# Patient Record
Sex: FEMALE | Race: WHITE | NOT HISPANIC OR LATINO
[De-identification: names, ages, dates, MRNs, and addresses within clinical notes are randomized per-mention and may not be internally consistent; named-entity substitution may affect disease eponyms.]

---

## 2022-05-18 ENCOUNTER — APPOINTMENT (OUTPATIENT)
Dept: CARDIOLOGY | Facility: CLINIC | Age: 87
End: 2022-05-18
Payer: MEDICARE

## 2022-05-18 VITALS
TEMPERATURE: 97 F | HEIGHT: 62 IN | DIASTOLIC BLOOD PRESSURE: 90 MMHG | SYSTOLIC BLOOD PRESSURE: 138 MMHG | WEIGHT: 152 LBS | BODY MASS INDEX: 27.97 KG/M2 | HEART RATE: 85 BPM

## 2022-05-18 PROCEDURE — 99205 OFFICE O/P NEW HI 60 MIN: CPT

## 2022-05-18 PROCEDURE — 93000 ELECTROCARDIOGRAM COMPLETE: CPT

## 2022-05-18 RX ORDER — DILTIAZEM HYDROCHLORIDE 120 MG/1
120 TABLET ORAL DAILY
Refills: 0 | Status: DISCONTINUED | COMMUNITY
End: 2022-05-18

## 2022-06-09 ENCOUNTER — APPOINTMENT (OUTPATIENT)
Dept: CARDIOLOGY | Facility: CLINIC | Age: 87
End: 2022-06-09

## 2022-06-11 ENCOUNTER — APPOINTMENT (OUTPATIENT)
Dept: CARDIOLOGY | Facility: CLINIC | Age: 87
End: 2022-06-11
Payer: MEDICARE

## 2022-06-11 PROCEDURE — 93306 TTE W/DOPPLER COMPLETE: CPT

## 2022-06-13 ENCOUNTER — INPATIENT (INPATIENT)
Facility: HOSPITAL | Age: 87
LOS: 7 days | Discharge: NURSING, REHAB OR LONG-TERM CARE | End: 2022-06-21
Attending: SURGERY | Admitting: SURGERY
Payer: MEDICARE

## 2022-06-13 VITALS
DIASTOLIC BLOOD PRESSURE: 79 MMHG | RESPIRATION RATE: 20 BRPM | HEART RATE: 84 BPM | TEMPERATURE: 99 F | SYSTOLIC BLOOD PRESSURE: 144 MMHG | OXYGEN SATURATION: 98 %

## 2022-06-13 LAB
APTT BLD: 23.1 SEC — CRITICAL LOW (ref 27–39.2)
BASE EXCESS BLDV CALC-SCNC: 1.5 MMOL/L — SIGNIFICANT CHANGE UP (ref -2–3)
BASOPHILS # BLD AUTO: 0.04 K/UL — SIGNIFICANT CHANGE UP (ref 0–0.2)
BASOPHILS NFR BLD AUTO: 0.5 % — SIGNIFICANT CHANGE UP (ref 0–1)
CA-I SERPL-SCNC: 1.25 MMOL/L — SIGNIFICANT CHANGE UP (ref 1.15–1.33)
EOSINOPHIL # BLD AUTO: 0.06 K/UL — SIGNIFICANT CHANGE UP (ref 0–0.7)
EOSINOPHIL NFR BLD AUTO: 0.7 % — SIGNIFICANT CHANGE UP (ref 0–8)
ETHANOL SERPL-MCNC: <10 MG/DL — SIGNIFICANT CHANGE UP
GAS PNL BLDV: 136 MMOL/L — SIGNIFICANT CHANGE UP (ref 136–145)
GAS PNL BLDV: SIGNIFICANT CHANGE UP
GAS PNL BLDV: SIGNIFICANT CHANGE UP
HCO3 BLDV-SCNC: 28 MMOL/L — SIGNIFICANT CHANGE UP (ref 22–29)
HCT VFR BLD CALC: 39.4 % — SIGNIFICANT CHANGE UP (ref 37–47)
HCT VFR BLDA CALC: 40 % — SIGNIFICANT CHANGE UP (ref 39–51)
HGB BLD CALC-MCNC: 13.4 G/DL — SIGNIFICANT CHANGE UP (ref 12.6–17.4)
HGB BLD-MCNC: 13.2 G/DL — SIGNIFICANT CHANGE UP (ref 12–16)
IMM GRANULOCYTES NFR BLD AUTO: 2 % — HIGH (ref 0.1–0.3)
INR BLD: 1.04 RATIO — SIGNIFICANT CHANGE UP (ref 0.65–1.3)
LACTATE BLDV-MCNC: 1.5 MMOL/L — SIGNIFICANT CHANGE UP (ref 0.5–2)
LACTATE SERPL-SCNC: 1.5 MMOL/L — SIGNIFICANT CHANGE UP (ref 0.7–2)
LYMPHOCYTES # BLD AUTO: 1.34 K/UL — SIGNIFICANT CHANGE UP (ref 1.2–3.4)
LYMPHOCYTES # BLD AUTO: 15.4 % — LOW (ref 20.5–51.1)
MCHC RBC-ENTMCNC: 30.9 PG — SIGNIFICANT CHANGE UP (ref 27–31)
MCHC RBC-ENTMCNC: 33.5 G/DL — SIGNIFICANT CHANGE UP (ref 32–37)
MCV RBC AUTO: 92.3 FL — SIGNIFICANT CHANGE UP (ref 81–99)
MONOCYTES # BLD AUTO: 0.53 K/UL — SIGNIFICANT CHANGE UP (ref 0.1–0.6)
MONOCYTES NFR BLD AUTO: 6.1 % — SIGNIFICANT CHANGE UP (ref 1.7–9.3)
NEUTROPHILS # BLD AUTO: 6.54 K/UL — HIGH (ref 1.4–6.5)
NEUTROPHILS NFR BLD AUTO: 75.3 % — HIGH (ref 42.2–75.2)
NRBC # BLD: 0 /100 WBCS — SIGNIFICANT CHANGE UP (ref 0–0)
PCO2 BLDV: 49 MMHG — HIGH (ref 39–42)
PH BLDV: 7.36 — SIGNIFICANT CHANGE UP (ref 7.32–7.43)
PLATELET # BLD AUTO: 156 K/UL — SIGNIFICANT CHANGE UP (ref 130–400)
PO2 BLDV: 25 MMHG — SIGNIFICANT CHANGE UP
POTASSIUM BLDV-SCNC: 4.4 MMOL/L — SIGNIFICANT CHANGE UP (ref 3.5–5.1)
PROTHROM AB SERPL-ACNC: 12 SEC — SIGNIFICANT CHANGE UP (ref 9.95–12.87)
RBC # BLD: 4.27 M/UL — SIGNIFICANT CHANGE UP (ref 4.2–5.4)
RBC # FLD: 13 % — SIGNIFICANT CHANGE UP (ref 11.5–14.5)
SAO2 % BLDV: 41.5 % — SIGNIFICANT CHANGE UP
WBC # BLD: 8.68 K/UL — SIGNIFICANT CHANGE UP (ref 4.8–10.8)
WBC # FLD AUTO: 8.68 K/UL — SIGNIFICANT CHANGE UP (ref 4.8–10.8)

## 2022-06-13 PROCEDURE — 99285 EMERGENCY DEPT VISIT HI MDM: CPT

## 2022-06-13 PROCEDURE — 72125 CT NECK SPINE W/O DYE: CPT | Mod: 26,MA

## 2022-06-13 PROCEDURE — 93010 ELECTROCARDIOGRAM REPORT: CPT

## 2022-06-13 PROCEDURE — 70450 CT HEAD/BRAIN W/O DYE: CPT | Mod: 26,MA

## 2022-06-13 RX ORDER — MORPHINE SULFATE 50 MG/1
4 CAPSULE, EXTENDED RELEASE ORAL ONCE
Refills: 0 | Status: DISCONTINUED | OUTPATIENT
Start: 2022-06-13 | End: 2022-06-13

## 2022-06-13 RX ORDER — SODIUM CHLORIDE 9 MG/ML
250 INJECTION INTRAMUSCULAR; INTRAVENOUS; SUBCUTANEOUS ONCE
Refills: 0 | Status: DISCONTINUED | OUTPATIENT
Start: 2022-06-13 | End: 2022-06-13

## 2022-06-13 RX ADMIN — MORPHINE SULFATE 4 MILLIGRAM(S): 50 CAPSULE, EXTENDED RELEASE ORAL at 23:33

## 2022-06-13 NOTE — ED PROVIDER NOTE - PROGRESS NOTE DETAILS
CHERRY: Case endorsed to Dr. Marti pending pan scan, re-eval, dispo as per trauma recs. CO- call from rad attending for CTAP finding c/f active bleed in pelvis, IR consult placed and case d/w res on call for IR- Dr Hutton, will rpt cbc will consult ortho, admit to SICU per trauma team -CD

## 2022-06-13 NOTE — CONSULT NOTE ADULT - SUBJECTIVE AND OBJECTIVE BOX
TRAUMA ACTIVATION LEVEL:  CODE / ALERT  / CONSULT  ACTIVATED BY: EMS**  /  ED**  INTUBATED: YES** / NO**      MECHANISM OF INJURY:   [] Blunt     [] MVC	  [X] Fall	  [] Pedestrian Struck	  [] Motorcycle     [] Assault     [] Bicycle collision    [] Sports injury    [] Penetrating    [] Gun Shot Wound      [] Stab Wound    GCS: 15 	E: 4	V: 5	M: 6    HPI:  89F w/ PMHx of HTN, HLD, cardiac arrhythmia, and CAD seen as Trauma Alert s/p mechanical fall w/ ?HT, ?LOC, -AC (although pt does not remember the fall). Fall was witnessed by daughter this evening, when she fell down some stairs onto her right side. Trauma assessment in ED: ABCs intact, GCS 15.     PAST MEDICAL & SURGICAL HISTORY:  HTN  HLD  arrhythmia  CAD  dilated aorta  cholecystectomy    Allergies  No Known Allergies    ROS: 10-system review is otherwise negative except HPI above.      Primary Survey:    A - airway intact  B - bilateral breath sounds and good chest rise  C - palpable pulses in all extremities  D - GCS 15 on arrival, LIPSCOMB  Exposure obtained    Vital Signs Last 24 Hrs  T(C): 37.1 (13 Jun 2022 22:57), Max: 37.1 (13 Jun 2022 22:57)  T(F): 98.8 (13 Jun 2022 22:57), Max: 98.8 (13 Jun 2022 22:57)  HR: 84 (13 Jun 2022 22:57) (84 - 84)  BP: 144/79 (13 Jun 2022 22:57) (144/79 - 144/79)  RR: 20 (13 Jun 2022 22:57) (20 - 20)  SpO2: 98% (13 Jun 2022 22:57) (98% - 98%)    Secondary Survey:   General: NAD  HEENT: Normocephalic, atraumatic, EOMI, PEERLA, no scalp lacerations   Neck: Soft, midline trachea. no c-spine tenderness  Chest: No chest wall tenderness, no subcutaneous emphysema  Cardiac: S1, S2, RRR  Respiratory: Bilateral breath sounds, clear and equal bilaterally  Abdomen: Soft, non-distended, non-tender, no rebound, no guarding  Groin: Normal appearing, pelvis stable  Ext: Moving b/l upper and lower extremities. Palpable Radial b/l UE, b/l DP palpable in LE  Back: No T/L/S spine tenderness, No palpable runoff/stepoff/deformity    ACCESS / DEVICES:  [X] Peripheral IV    Labs:  CAPILLARY BLOOD GLUCOSE  POCT Blood Glucose.: 116 mg/dL (13 Jun 2022 22:08)                      13.2   8.68  )-----------( 156      ( 13 Jun 2022 22:40 )             39.4       Auto Neutrophil %: 75.3 % (06-13-22 @ 22:40)  Auto Immature Granulocyte %: 2.0 % (06-13-22 @ 22:40)    Blood Gas Venous - Lactate: 1.50 mmol/L (06-13-22 @ 22:59)  Lactate, Blood: 1.5 mmol/L (06-13-22 @ 22:40)    Coags:     12.00  ----< 1.04    ( 13 Jun 2022 22:40 )     23.1      Alcohol, Blood: <10 mg/dL (06-13-22 @ 22:40)    RADIOLOGY & ADDITIONAL STUDIES:  ---------------------------------------------------------------------------------------     0

## 2022-06-13 NOTE — ED PROVIDER NOTE - OBJECTIVE STATEMENT
Pt is a 89F with PMH of ? p/w fall. PT had witnessed fall by daughter this evening down stair landing. PT denies LOC or HT, no AC, no preceding chest pain. PT denies f/c/n/v/d, abd pain, urinary symptoms. PT endorsing R hip pain, denies focal numbness or weakness. Pt is a 89F with PMH of HTN, HLD, cardiac arrhythmia, and CAD p/w mechanical fall. PT had witnessed fall by daughter this evening down stair landing. PT denies LOC or HT, no AC, no preceding chest pain. PT denies f/c/n/v/d, abd pain, urinary symptoms. PT endorsing R hip pain, denies focal numbness or weakness.

## 2022-06-13 NOTE — ED ADULT NURSE NOTE - NSIMPLEMENTINTERV_GEN_ALL_ED
Implemented All Fall with Harm Risk Interventions:  [place] to call system. Call bell, personal items and telephone within reach. Instruct patient to call for assistance. Room bathroom lighting operational. Non-slip footwear when patient is off stretcher. Physically safe environment: no spills, clutter or unnecessary equipment. Stretcher in lowest position, wheels locked, appropriate side rails in place. Provide visual cue, wrist band, yellow gown, etc. Monitor gait and stability. Monitor for mental status changes and reorient to person, place, and time. Review medications for side effects contributing to fall risk. Reinforce activity limits and safety measures with patient and family. Provide visual clues: red socks.

## 2022-06-13 NOTE — ED PROVIDER NOTE - NS ED ROS FT
Constitutional: No fatigue, confusion, or amnesia.  Head: No headache  ENT: No visual changes.  Cardiac:  No chest pain or SOB.  Respiratory:  No respiratory distress or hemoptysis.  GI:  No nausea, vomiting, or abdominal pain.  :  No incontinence.  MS:  +R hip pain, no back pain.  Neuro:  No dizziness, LOC, paralysis, or N/T.  Skin:  No lacerations or abrasions.

## 2022-06-13 NOTE — CONSULT NOTE ADULT - ASSESSMENT
ASSESSMENT:  89F w/ PMHx of HTN, HLD, cardiac arrhythmia, and CAD seen as Trauma Alert s/p mechanical fall w/ ?HT, ?LOC, -AC, no external signs of trauma. Trauma assessment in ED: ABCs intact, GCS 15, LIPSCOMB.     Injuries identified:   - pending    PLAN:   - Trauma Labs: (CBC, BMP, Coags, T&S, UA, EtOH level)  Additional studies:  EKG    Trauma Imaging to include the following:  - CXR, Pelvic Xray  - CT Head, CT C-spine, CT Max/Face, CT Chest, CT Abd/Pelvis  - Extremity films: None    Additional consultations:  - pending    Disposition pending results of above labs and imaging  Above plan discussed with Trauma attending, Dr. Abraham, patient, patient family, and ED team  --------------------------------------------------------------------------------------  06-13-22 @ 23:57

## 2022-06-13 NOTE — ED PROVIDER NOTE - PHYSICAL EXAMINATION
CONSTITUTIONAL: WDWN. NAD. Speaking in full sentences, moving all extremities.  SKIN: No lacerations or abrasions.  HEAD: NCAT  EYES: PERRLA, EOMI  ENT: TMs WNL. No hemotympanum noted.  NECK: No posterior midline cervical tenderness  CARD: +S1, S2 no murmurs, gallops, or rubs. Regular rate and rhythm. Radial, DP, PT 2+/4 bilaterally  RESP: LCTAB. No wheezes, rales or rhonchi.  ABD: Abdomen soft, nontender, nondistended.  NEURO: Alert, oriented, grossly unremarkable. Strength 5/5 in bilateral UE and LEs. CN 2-12 grossly intact. Follows commands.  MSK: No TTP in UE and LEs. No chest wall tenderness or crepitus. +TTP of the RT hip  BACK: No posterior midline tenderness  PSYCH: Cooperative, appropriate.

## 2022-06-13 NOTE — ED PROVIDER NOTE - ATTENDING CONTRIBUTION TO CARE
89-year-old female past medical history as documented, not on anticoagulation, presenting status post mechanical fall from standing.  Patient states she lost her balance and fell, unsure if she hit her head or had LOC.  At this time complaining of diffuse body pain described as achy, nonradiating, no palliative or provocative factors, mild severity.     Vital Signs: I have reviewed the initial vital signs.  Constitutional: NAD, well-nourished, appears stated age, no acute distress.  HEENT: Airway patent, moist MM, no erythema/swelling/deformity of oral structures. EOMI, PERRLA.  CV: regular rate, regular rhythm, well-perfused extremities, 2+ b/l DP and radial pulses equal.  Lungs: BCTA, no increased WOB.  ABD: NTND, no guarding or rebound, no pulsatile mass, no hernias.   MSK: Neck supple, nontender, nl ROM, no stepoff. Chest nontender. Back nontender in TLS spine or to b/l bony structures or flanks. Ext nontender, nl rom, no deformity.   INTEG: Skin warm, dry, no rash.  NEURO: A&Ox3, normal strength, nl sensation throughout, normal speech.   PSYCH: Calm, cooperative, normal affect and interaction. 89-year-old female past medical history as documented, not on anticoagulation, presenting status post mechanical fall from standing.  Patient states she lost her balance and fell, unsure if she hit her head or had LOC.  At this time complaining of diffuse body pain described as achy, nonradiating, no palliative or provocative factors, mild severity.     Vital Signs: I have reviewed the initial vital signs.  Constitutional: NAD, well-nourished, appears stated age, no acute distress.  HEENT: Airway patent, moist MM, no erythema/swelling/deformity of oral structures. EOMI, PERRLA.  CV: regular rate, regular rhythm, well-perfused extremities, 2+ b/l DP and radial pulses equal.  Lungs: BCTA, no increased WOB.  ABD: NTND, no guarding or rebound, no pulsatile mass, no hernias.   MSK: Neck supple, nontender, nl ROM, no stepoff. Chest nontender. Back nontender in TLS spine or to b/l bony structures or flanks. Ext nontender, nl rom, no deformity.   INTEG: Skin warm, dry, no rash.  NEURO: A&Ox3, normal strength, nl sensation throughout, normal speech.   PSYCH: Calm, cooperative, normal affect and interaction.    Patient called as trauma alert as patient was pre-notification. Trauma at bedside. WIll pan scan per trauma recs, obtain labs, re-eval.

## 2022-06-13 NOTE — ED PROVIDER NOTE - CLINICAL SUMMARY MEDICAL DECISION MAKING FREE TEXT BOX
trauma w/u revealing pelvic fractures, extraperitoneal hematoma w/ question of active bleed. IR consulted who rec monitoring, serial CBC for now.  case d/w trauma team, rec SICU for monitoring

## 2022-06-13 NOTE — ED PROVIDER NOTE - TOBACCO USE
Unknown if ever smoked

## 2022-06-13 NOTE — ED PROVIDER NOTE - CARE PLAN
Principal Discharge DX:	Pubic ramus fracture   1 Principal Discharge DX:	Pubic ramus fracture  Secondary Diagnosis:	Hematoma of extraperitoneal space

## 2022-06-14 LAB
ALBUMIN SERPL ELPH-MCNC: 3.9 G/DL — SIGNIFICANT CHANGE UP (ref 3.5–5.2)
ALP SERPL-CCNC: 65 U/L — SIGNIFICANT CHANGE UP (ref 30–115)
ALT FLD-CCNC: 25 U/L — SIGNIFICANT CHANGE UP (ref 0–41)
ANION GAP SERPL CALC-SCNC: 13 MMOL/L — SIGNIFICANT CHANGE UP (ref 7–14)
APPEARANCE UR: ABNORMAL
APPEARANCE UR: CLEAR — SIGNIFICANT CHANGE UP
AST SERPL-CCNC: 24 U/L — SIGNIFICANT CHANGE UP (ref 0–41)
BACTERIA # UR AUTO: NEGATIVE — SIGNIFICANT CHANGE UP
BACTERIA # UR AUTO: NEGATIVE — SIGNIFICANT CHANGE UP
BASOPHILS # BLD AUTO: 0.05 K/UL — SIGNIFICANT CHANGE UP (ref 0–0.2)
BASOPHILS # BLD AUTO: 0.08 K/UL — SIGNIFICANT CHANGE UP (ref 0–0.2)
BASOPHILS NFR BLD AUTO: 0.3 % — SIGNIFICANT CHANGE UP (ref 0–1)
BASOPHILS NFR BLD AUTO: 0.5 % — SIGNIFICANT CHANGE UP (ref 0–1)
BILIRUB SERPL-MCNC: 0.6 MG/DL — SIGNIFICANT CHANGE UP (ref 0.2–1.2)
BILIRUB UR-MCNC: NEGATIVE — SIGNIFICANT CHANGE UP
BILIRUB UR-MCNC: NEGATIVE — SIGNIFICANT CHANGE UP
BLD GP AB SCN SERPL QL: SIGNIFICANT CHANGE UP
BUN SERPL-MCNC: 26 MG/DL — HIGH (ref 10–20)
CALCIUM SERPL-MCNC: 9.3 MG/DL — SIGNIFICANT CHANGE UP (ref 8.5–10.1)
CHLORIDE SERPL-SCNC: 104 MMOL/L — SIGNIFICANT CHANGE UP (ref 98–110)
CO2 SERPL-SCNC: 22 MMOL/L — SIGNIFICANT CHANGE UP (ref 17–32)
COLOR SPEC: YELLOW — SIGNIFICANT CHANGE UP
COLOR SPEC: YELLOW — SIGNIFICANT CHANGE UP
CREAT SERPL-MCNC: 1 MG/DL — SIGNIFICANT CHANGE UP (ref 0.7–1.5)
DIFF PNL FLD: ABNORMAL
DIFF PNL FLD: ABNORMAL
EGFR: 53 ML/MIN/1.73M2 — LOW
EOSINOPHIL # BLD AUTO: 0.08 K/UL — SIGNIFICANT CHANGE UP (ref 0–0.7)
EOSINOPHIL # BLD AUTO: 0.18 K/UL — SIGNIFICANT CHANGE UP (ref 0–0.7)
EOSINOPHIL NFR BLD AUTO: 0.5 % — SIGNIFICANT CHANGE UP (ref 0–8)
EOSINOPHIL NFR BLD AUTO: 1.2 % — SIGNIFICANT CHANGE UP (ref 0–8)
EPI CELLS # UR: 14 /HPF — HIGH (ref 0–5)
EPI CELLS # UR: 5 /HPF — SIGNIFICANT CHANGE UP (ref 0–5)
GLUCOSE SERPL-MCNC: 131 MG/DL — HIGH (ref 70–99)
GLUCOSE UR QL: NEGATIVE — SIGNIFICANT CHANGE UP
GLUCOSE UR QL: SIGNIFICANT CHANGE UP
HCT VFR BLD CALC: 36.3 % — LOW (ref 37–47)
HCT VFR BLD CALC: 36.3 % — LOW (ref 37–47)
HCT VFR BLD CALC: 38.1 % — SIGNIFICANT CHANGE UP (ref 37–47)
HGB BLD-MCNC: 12.3 G/DL — SIGNIFICANT CHANGE UP (ref 12–16)
HGB BLD-MCNC: 12.4 G/DL — SIGNIFICANT CHANGE UP (ref 12–16)
HGB BLD-MCNC: 12.7 G/DL — SIGNIFICANT CHANGE UP (ref 12–16)
HYALINE CASTS # UR AUTO: 22 /LPF — HIGH (ref 0–7)
HYALINE CASTS # UR AUTO: 48 /LPF — HIGH (ref 0–7)
IMM GRANULOCYTES NFR BLD AUTO: 0.6 % — HIGH (ref 0.1–0.3)
IMM GRANULOCYTES NFR BLD AUTO: 0.6 % — HIGH (ref 0.1–0.3)
KETONES UR-MCNC: NEGATIVE — SIGNIFICANT CHANGE UP
KETONES UR-MCNC: SIGNIFICANT CHANGE UP
LEUKOCYTE ESTERASE UR-ACNC: ABNORMAL
LEUKOCYTE ESTERASE UR-ACNC: NEGATIVE — SIGNIFICANT CHANGE UP
LIDOCAIN IGE QN: 27 U/L — SIGNIFICANT CHANGE UP (ref 7–60)
LYMPHOCYTES # BLD AUTO: 0.61 K/UL — LOW (ref 1.2–3.4)
LYMPHOCYTES # BLD AUTO: 1.32 K/UL — SIGNIFICANT CHANGE UP (ref 1.2–3.4)
LYMPHOCYTES # BLD AUTO: 3.8 % — LOW (ref 20.5–51.1)
LYMPHOCYTES # BLD AUTO: 8.6 % — LOW (ref 20.5–51.1)
MCHC RBC-ENTMCNC: 30.7 PG — SIGNIFICANT CHANGE UP (ref 27–31)
MCHC RBC-ENTMCNC: 30.9 PG — SIGNIFICANT CHANGE UP (ref 27–31)
MCHC RBC-ENTMCNC: 31.3 PG — HIGH (ref 27–31)
MCHC RBC-ENTMCNC: 33.3 G/DL — SIGNIFICANT CHANGE UP (ref 32–37)
MCHC RBC-ENTMCNC: 33.9 G/DL — SIGNIFICANT CHANGE UP (ref 32–37)
MCHC RBC-ENTMCNC: 34.2 G/DL — SIGNIFICANT CHANGE UP (ref 32–37)
MCV RBC AUTO: 91.2 FL — SIGNIFICANT CHANGE UP (ref 81–99)
MCV RBC AUTO: 91.7 FL — SIGNIFICANT CHANGE UP (ref 81–99)
MCV RBC AUTO: 92 FL — SIGNIFICANT CHANGE UP (ref 81–99)
MONOCYTES # BLD AUTO: 0.87 K/UL — HIGH (ref 0.1–0.6)
MONOCYTES # BLD AUTO: 0.97 K/UL — HIGH (ref 0.1–0.6)
MONOCYTES NFR BLD AUTO: 5.5 % — SIGNIFICANT CHANGE UP (ref 1.7–9.3)
MONOCYTES NFR BLD AUTO: 6.3 % — SIGNIFICANT CHANGE UP (ref 1.7–9.3)
NEUTROPHILS # BLD AUTO: 12.73 K/UL — HIGH (ref 1.4–6.5)
NEUTROPHILS # BLD AUTO: 14.2 K/UL — HIGH (ref 1.4–6.5)
NEUTROPHILS NFR BLD AUTO: 82.8 % — HIGH (ref 42.2–75.2)
NEUTROPHILS NFR BLD AUTO: 89.3 % — HIGH (ref 42.2–75.2)
NITRITE UR-MCNC: NEGATIVE — SIGNIFICANT CHANGE UP
NITRITE UR-MCNC: NEGATIVE — SIGNIFICANT CHANGE UP
NRBC # BLD: 0 /100 WBCS — SIGNIFICANT CHANGE UP (ref 0–0)
PH UR: 6 — SIGNIFICANT CHANGE UP (ref 5–8)
PH UR: 6 — SIGNIFICANT CHANGE UP (ref 5–8)
PLATELET # BLD AUTO: 129 K/UL — LOW (ref 130–400)
PLATELET # BLD AUTO: 143 K/UL — SIGNIFICANT CHANGE UP (ref 130–400)
PLATELET # BLD AUTO: 146 K/UL — SIGNIFICANT CHANGE UP (ref 130–400)
POTASSIUM SERPL-MCNC: 4.9 MMOL/L — SIGNIFICANT CHANGE UP (ref 3.5–5)
POTASSIUM SERPL-SCNC: 4.9 MMOL/L — SIGNIFICANT CHANGE UP (ref 3.5–5)
PROT SERPL-MCNC: 5.9 G/DL — LOW (ref 6–8)
PROT UR-MCNC: ABNORMAL
PROT UR-MCNC: ABNORMAL
RBC # BLD: 3.96 M/UL — LOW (ref 4.2–5.4)
RBC # BLD: 3.98 M/UL — LOW (ref 4.2–5.4)
RBC # BLD: 4.14 M/UL — LOW (ref 4.2–5.4)
RBC # FLD: 13.1 % — SIGNIFICANT CHANGE UP (ref 11.5–14.5)
RBC # FLD: 13.2 % — SIGNIFICANT CHANGE UP (ref 11.5–14.5)
RBC # FLD: 13.2 % — SIGNIFICANT CHANGE UP (ref 11.5–14.5)
RBC CASTS # UR COMP ASSIST: 1 /HPF — SIGNIFICANT CHANGE UP (ref 0–4)
RBC CASTS # UR COMP ASSIST: 5 /HPF — HIGH (ref 0–4)
SARS-COV-2 RNA SPEC QL NAA+PROBE: SIGNIFICANT CHANGE UP
SODIUM SERPL-SCNC: 139 MMOL/L — SIGNIFICANT CHANGE UP (ref 135–146)
SP GR SPEC: 1.02 — SIGNIFICANT CHANGE UP (ref 1.01–1.03)
SP GR SPEC: 1.03 — SIGNIFICANT CHANGE UP (ref 1.01–1.03)
UROBILINOGEN FLD QL: SIGNIFICANT CHANGE UP
UROBILINOGEN FLD QL: SIGNIFICANT CHANGE UP
WBC # BLD: 15.17 K/UL — HIGH (ref 4.8–10.8)
WBC # BLD: 15.37 K/UL — HIGH (ref 4.8–10.8)
WBC # BLD: 15.91 K/UL — HIGH (ref 4.8–10.8)
WBC # FLD AUTO: 15.17 K/UL — HIGH (ref 4.8–10.8)
WBC # FLD AUTO: 15.37 K/UL — HIGH (ref 4.8–10.8)
WBC # FLD AUTO: 15.91 K/UL — HIGH (ref 4.8–10.8)
WBC UR QL: 33 /HPF — HIGH (ref 0–5)
WBC UR QL: 50 /HPF — HIGH (ref 0–5)

## 2022-06-14 PROCEDURE — 74176 CT ABD & PELVIS W/O CONTRAST: CPT | Mod: 26,59

## 2022-06-14 PROCEDURE — 99223 1ST HOSP IP/OBS HIGH 75: CPT

## 2022-06-14 PROCEDURE — 73502 X-RAY EXAM HIP UNI 2-3 VIEWS: CPT | Mod: 26,LT,76

## 2022-06-14 PROCEDURE — 99222 1ST HOSP IP/OBS MODERATE 55: CPT

## 2022-06-14 PROCEDURE — 71045 X-RAY EXAM CHEST 1 VIEW: CPT | Mod: 26

## 2022-06-14 PROCEDURE — 71260 CT THORAX DX C+: CPT | Mod: 26,MA

## 2022-06-14 PROCEDURE — 73562 X-RAY EXAM OF KNEE 3: CPT | Mod: 26,50

## 2022-06-14 PROCEDURE — 73501 X-RAY EXAM HIP UNI 1 VIEW: CPT | Mod: 26,RT

## 2022-06-14 PROCEDURE — 73610 X-RAY EXAM OF ANKLE: CPT | Mod: 26,LT,76

## 2022-06-14 PROCEDURE — 72170 X-RAY EXAM OF PELVIS: CPT | Mod: 26,59

## 2022-06-14 PROCEDURE — 74177 CT ABD & PELVIS W/CONTRAST: CPT | Mod: 26,MA

## 2022-06-14 PROCEDURE — 74018 RADEX ABDOMEN 1 VIEW: CPT | Mod: 26

## 2022-06-14 RX ORDER — ACETAMINOPHEN 500 MG
1000 TABLET ORAL ONCE
Refills: 0 | Status: COMPLETED | OUTPATIENT
Start: 2022-06-14 | End: 2022-06-14

## 2022-06-14 RX ORDER — METOPROLOL TARTRATE 50 MG
25 TABLET ORAL DAILY
Refills: 0 | Status: DISCONTINUED | OUTPATIENT
Start: 2022-06-14 | End: 2022-06-16

## 2022-06-14 RX ORDER — LIDOCAINE 4 G/100G
1 CREAM TOPICAL DAILY
Refills: 0 | Status: DISCONTINUED | OUTPATIENT
Start: 2022-06-14 | End: 2022-06-21

## 2022-06-14 RX ORDER — ACETAMINOPHEN 500 MG
650 TABLET ORAL EVERY 6 HOURS
Refills: 0 | Status: COMPLETED | OUTPATIENT
Start: 2022-06-14 | End: 2022-06-17

## 2022-06-14 RX ORDER — CHLORHEXIDINE GLUCONATE 213 G/1000ML
1 SOLUTION TOPICAL
Refills: 0 | Status: DISCONTINUED | OUTPATIENT
Start: 2022-06-14 | End: 2022-06-21

## 2022-06-14 RX ORDER — FUROSEMIDE 40 MG
20 TABLET ORAL DAILY
Refills: 0 | Status: DISCONTINUED | OUTPATIENT
Start: 2022-06-14 | End: 2022-06-16

## 2022-06-14 RX ORDER — GABAPENTIN 400 MG/1
100 CAPSULE ORAL EVERY 8 HOURS
Refills: 0 | Status: DISCONTINUED | OUTPATIENT
Start: 2022-06-14 | End: 2022-06-17

## 2022-06-14 RX ORDER — BRIMONIDINE TARTRATE 2 MG/MG
1 SOLUTION/ DROPS OPHTHALMIC DAILY
Refills: 0 | Status: DISCONTINUED | OUTPATIENT
Start: 2022-06-14 | End: 2022-06-21

## 2022-06-14 RX ORDER — VALSARTAN 80 MG/1
80 TABLET ORAL DAILY
Refills: 0 | Status: DISCONTINUED | OUTPATIENT
Start: 2022-06-14 | End: 2022-06-15

## 2022-06-14 RX ORDER — HYDROMORPHONE HYDROCHLORIDE 2 MG/ML
0.25 INJECTION INTRAMUSCULAR; INTRAVENOUS; SUBCUTANEOUS EVERY 4 HOURS
Refills: 0 | Status: DISCONTINUED | OUTPATIENT
Start: 2022-06-14 | End: 2022-06-14

## 2022-06-14 RX ORDER — HEPARIN SODIUM 5000 [USP'U]/ML
5000 INJECTION INTRAVENOUS; SUBCUTANEOUS EVERY 8 HOURS
Refills: 0 | Status: DISCONTINUED | OUTPATIENT
Start: 2022-06-14 | End: 2022-06-21

## 2022-06-14 RX ORDER — SODIUM CHLORIDE 9 MG/ML
1000 INJECTION, SOLUTION INTRAVENOUS
Refills: 0 | Status: DISCONTINUED | OUTPATIENT
Start: 2022-06-14 | End: 2022-06-14

## 2022-06-14 RX ORDER — PANTOPRAZOLE SODIUM 20 MG/1
40 TABLET, DELAYED RELEASE ORAL DAILY
Refills: 0 | Status: DISCONTINUED | OUTPATIENT
Start: 2022-06-14 | End: 2022-06-15

## 2022-06-14 RX ORDER — OLANZAPINE 15 MG/1
2.5 TABLET, FILM COATED ORAL ONCE
Refills: 0 | Status: DISCONTINUED | OUTPATIENT
Start: 2022-06-14 | End: 2022-06-15

## 2022-06-14 RX ORDER — OLANZAPINE 15 MG/1
2.5 TABLET, FILM COATED ORAL ONCE
Refills: 0 | Status: COMPLETED | OUTPATIENT
Start: 2022-06-14 | End: 2022-06-14

## 2022-06-14 RX ORDER — LATANOPROST 0.05 MG/ML
1 SOLUTION/ DROPS OPHTHALMIC; TOPICAL AT BEDTIME
Refills: 0 | Status: DISCONTINUED | OUTPATIENT
Start: 2022-06-14 | End: 2022-06-21

## 2022-06-14 RX ADMIN — HYDROMORPHONE HYDROCHLORIDE 0.25 MILLIGRAM(S): 2 INJECTION INTRAMUSCULAR; INTRAVENOUS; SUBCUTANEOUS at 13:06

## 2022-06-14 RX ADMIN — LIDOCAINE 1 PATCH: 4 CREAM TOPICAL at 12:59

## 2022-06-14 RX ADMIN — Medication 25 MILLIGRAM(S): at 02:35

## 2022-06-14 RX ADMIN — PANTOPRAZOLE SODIUM 40 MILLIGRAM(S): 20 TABLET, DELAYED RELEASE ORAL at 12:59

## 2022-06-14 RX ADMIN — SODIUM CHLORIDE 100 MILLILITER(S): 9 INJECTION, SOLUTION INTRAVENOUS at 05:44

## 2022-06-14 RX ADMIN — Medication 400 MILLIGRAM(S): at 10:15

## 2022-06-14 RX ADMIN — Medication 1000 MILLIGRAM(S): at 12:00

## 2022-06-14 RX ADMIN — Medication 650 MILLIGRAM(S): at 18:52

## 2022-06-14 RX ADMIN — SODIUM CHLORIDE 100 MILLILITER(S): 9 INJECTION, SOLUTION INTRAVENOUS at 12:58

## 2022-06-14 RX ADMIN — Medication 20 MILLIGRAM(S): at 02:35

## 2022-06-14 RX ADMIN — GABAPENTIN 100 MILLIGRAM(S): 400 CAPSULE ORAL at 12:59

## 2022-06-14 RX ADMIN — VALSARTAN 80 MILLIGRAM(S): 80 TABLET ORAL at 10:16

## 2022-06-14 RX ADMIN — SODIUM CHLORIDE 100 MILLILITER(S): 9 INJECTION, SOLUTION INTRAVENOUS at 10:15

## 2022-06-14 RX ADMIN — OLANZAPINE 2.5 MILLIGRAM(S): 15 TABLET, FILM COATED ORAL at 21:18

## 2022-06-14 NOTE — CONSULT NOTE ADULT - ASSESSMENT
Assessment & Plan  89F s/p witnessed mechanical fall at home, ?HT, -LOC, -AC, found to have R superior/inferior pubic rami fx, R sacral ala compression fx, and pelvic hemorrhage appearing extraperitoneal and measuring 11.5x8.5cm.  On CT, there is a small linear hyperdensity posterior to the pubic symphysis which may reflect a component of active bleeding, for which SICU was consulted for hemodynamic monitoring and Q1H vitals.    NEURO:  #Acute pain    - Tylenol,     RESP:   #Oxygenation    -on RA  No chronic issues      CARDS:   Hx of CAD, HTN, HLD, cardiac arrhythmia  Rx-furosemide    Tablet 20 daily  metoprolol succinate ER 25 daily  valsartan 80 daily    GI/NUTR:   NPO  #GI Prophylaxis    -not indicated  #Bowel regimen    -senna/miralax  No chronic issues    /RENAL:     Monitor UO- Larson ordered    Labs:          BUN/Cr- 26/1.0  -->          Electrolytes-Na 139 // K 4.9 // Mg -- //  Phos -- (06-13 @ 22:40)  #Pelvic hemorrhage measuring 11.5x8.5cm   -Per IR, no acute intervention  Pending CT Cystogram to rule out bladder injury           HEME/ONC:   #  DVT prophylaxis-, SCDs    Labs: Hb/Hct:  13.2/39.4  -->,  12.7/38.1  -->                      Plts:  156  -->,  146  -->                 PTT/INR:  23.1/1.04  --->       Home Rx:     ID:  #leukocytosis   WBC- 8.68  --->>,  15.17  --->>  Temp trend- 24hrs T(F): 98.8 (06-13 @ 22:57), Max: 98.8 (06-13 @ 22:57)        ENDO:    Glucose, Serum: 131 (06-13 @ 22:40)  No chronic issues    MSK:  #R superior/inferior pubic rami fx, R sacral ala compression fx   -Orthopedic surgery: No acute intervention    LINES/DRAINS:  PIV, Larson    ADVANCED DIRECTIVES:  Full Code    HCP/Emergency Contact-    INDICATION FOR SICU/SDU: PUBIC RAMUS FRACTURE, pelvic hemorrhage, Q1H vitals             DISPO:     Case discussed with attending Dr. Abraham

## 2022-06-14 NOTE — CONSULT NOTE ADULT - ASSESSMENT
Pt is a 89yo Female s/p fall, sustained multiple pelvic fractures - CT cystogram showing possible vaginal fistula vs reflux of contrast, urine yellow.    ·	maintain hollingsworth catheter for 10-14 days, prior to TOV will need rpt cystogram (can be done as OP if patient stable, will need to arrange outpatient follow up)  ·	hollingsworth catheter care  ·	Dr Landeros at bedside.  Pt is a 89yo Female s/p fall, sustained multiple pelvic fractures - CT cystogram showing possible vaginal fistula vs reflux of contrast, urine yellow.  I reviewed the CAT scan there is a small amount of contrast coming into the vagina I do not know if this leaked around the catheter and ended up going backwards into the vagina whether this is a true traumatic leak or whether she had a prior small fistula.  Regardless she is not going to be out of bed for some time at least not to the point that she will be able to urinate so she will need a catheter.  She should have A catheter care and when they are getting ready to give her a trial of voiding i.e. should be able to move around enough to to get even on a commode or bedpan should get a urine culture from the catheter treat as if needed and then get a CT cystogram to see if any leakage that was noted is now resolved.  Depending on the degree of leakage of present we will then discuss further options      ·	maintain hollingsworth catheter for 10-14 days if not longer if needed for decreased mobility as given the Fx's she is nto a good candidate for CIC, prior to TOV will need rpt cystogram (can be done as OP if patient stable, will need to arrange outpatient follow up)  ·	hollingsworth catheter care  ·	Dr Landeros at bedside.

## 2022-06-14 NOTE — H&P ADULT - NSHPPHYSICALEXAM_GEN_ALL_CORE
Primary Survey:    A - airway intact  B - bilateral breath sounds and good chest rise  C - palpable pulses in all extremities  D - GCS 15 on arrival, LIPSCOMB  Exposure obtained    Vital Signs Last 24 Hrs  T(C): 37.1 (13 Jun 2022 22:57), Max: 37.1 (13 Jun 2022 22:57)  T(F): 98.8 (13 Jun 2022 22:57), Max: 98.8 (13 Jun 2022 22:57)  HR: 84 (13 Jun 2022 22:57) (84 - 84)  BP: 144/79 (13 Jun 2022 22:57) (144/79 - 144/79)  RR: 20 (13 Jun 2022 22:57) (20 - 20)  SpO2: 98% (13 Jun 2022 22:57) (98% - 98%)    Secondary Survey:   General: NAD  HEENT: Normocephalic, atraumatic, EOMI, PEERLA, no scalp lacerations   Neck: Soft, midline trachea. no c-spine tenderness  Chest: No chest wall tenderness, no subcutaneous emphysema  Cardiac: S1, S2, RRR  Respiratory: Bilateral breath sounds, clear and equal bilaterally  Abdomen: Soft, non-distended, non-tender, no rebound, no guarding  Groin: Normal appearing, pelvis stable  Ext: Moving b/l upper and lower extremities. Palpable Radial b/l UE, b/l DP palpable in LE. R leg shortened and internally rotated   Back: No T/L/S spine tenderness, No palpable runoff/stepoff/deformity    ACCESS / DEVICES:  [X] Peripheral IV

## 2022-06-14 NOTE — CONSULT NOTE ADULT - NS ATTEND AMEND GEN_ALL_CORE FT
I personally spoke with her and her daughter with her daughter acting as , the limited exam reviewed the films and explained the situation to her and her daughter.  Patient was seen yesterday the note was not reviewed and signed until today due to conflicting schedules

## 2022-06-14 NOTE — CONSULT NOTE ADULT - SUBJECTIVE AND OBJECTIVE BOX
Pt Name: KIN HERNANDEZ  MRN: [id number]      HPI: 89F w/ PMHx of HTN, HLD, cardiac arrhythmia, and CAD seen as Trauma Alert s/p mechanical fall w/ ?HT, ?LOC, -AC (although pt does not remember the fall). Fall was witnessed by daughter this evening, when she fell down some stairs onto her right side. Trauma assessment in ED: ABCs intact, GCS 15.     PAST MEDICAL & SURGICAL HISTORY:  HTN  HLD  arrhythmia  CAD  dilated aorta  cholecystectomy      Medications: brimonidine 0.2% Ophthalmic Solution 1 Drop(s) Both EYES daily  chlorhexidine 4% Liquid 1 Application(s) Topical <User Schedule>  furosemide    Tablet 20 milliGRAM(s) Oral daily  lactated ringers. 1000 milliLiter(s) IV Continuous <Continuous>  latanoprost 0.005% Ophthalmic Solution 1 Drop(s) Both EYES at bedtime  metoprolol succinate ER 25 milliGRAM(s) Oral daily  pantoprazole  Injectable 40 milliGRAM(s) IV Push daily  valsartan 80 milliGRAM(s) Oral daily        PHYSICAL EXAM:    Vital Signs Last 24 Hrs  T(C): 37.1 (13 Jun 2022 22:57), Max: 37.1 (13 Jun 2022 22:57)  T(F): 98.8 (13 Jun 2022 22:57), Max: 98.8 (13 Jun 2022 22:57)  HR: 84 (13 Jun 2022 22:57) (84 - 84)  BP: 144/79 (13 Jun 2022 22:57) (144/79 - 144/79)  BP(mean): --  RR: 20 (13 Jun 2022 22:57) (20 - 20)  SpO2: 98% (13 Jun 2022 22:57) (98% - 98%)    Physical Exam:  General: NAD, Alert, Awake and oriented  Neurologic: No gross deficits, moving all 4s.  Head: NCAT without abrasions, lacerations, or ecchymosis to head, face, or scalp  Respiratory: Unlabored breathing   Abdomen: Soft, Nontender, no guarding.  Musculoskeletal:      PELVIS: No open skin or wounds. Pelvis stable to AP and Lat compression. Able to actively SLR bilaterally.   TTP over symphysis      BL UE  Skin intact  No bony TTP  Grossly NVID    BL LE:  No open skin or wounds  No bony TTP  Mild Pain with ROM of hip  Grossly NVID      Labs:                        12.7   15.17 )-----------( 146      ( 14 Jun 2022 01:10 )             38.1     06-13    139  |  104  |  26<H>  ----------------------------<  131<H>  4.9   |  22  |  1.0    Ca    9.3      13 Jun 2022 22:40    TPro  5.9<L>  /  Alb  3.9  /  TBili  0.6  /  DBili  x   /  AST  24  /  ALT  25  /  AlkPhos  65  06-13    PT/INR - ( 13 Jun 2022 22:40 )   PT: 12.00 sec;   INR: 1.04 ratio         PTT - ( 13 Jun 2022 22:40 )  PTT:23.1 sec    Imaging: Pelvis XR and Ct: nondisplaced right superior and inferior rami fractures, non displaced right sacral ala compression fracture. No orthopedic surgical intervention at this time    A/P:    89y Female with right LC1 fracture    -WBAT   -Pain control  - PT/OT  -DVT PPX  - IS  - Rest of management per primary

## 2022-06-14 NOTE — H&P ADULT - ASSESSMENT
89F w/ PMHx of HTN, HLD, cardiac arrhythmia, and CAD seen as Trauma Alert s/p mechanical fall w/ ?HT, ?LOC, -AC, no external signs of trauma. Trauma assessment in ED: ABCs intact, GCS 15, LIPSCOMB.     Injuries identified:   - Acute displaced fracture extending from the right superior pubic ramus through the pubic body and into the right inferior pubic ramus.  - Additional acute nondisplaced right inferior pubic ramus fracture.  - Acute nondisplaced right sacral fracture.  - Hemorrhage in the pelvis which appears essentially extraperitoneal and spans at least 11.5 x 8.5 cm which may reflect a component of active bleeding.    PLAN:   - admit to SICU for close monitoring in the setting of questionable active pelvic bleed  - f/u ortho consultation  - stat CT cystogram to r/o bladder injury  - home meds  - Monitor vitals  - Monitor labs and replete as necessary  - Pain control  - Monitor urine output  - DVT and GI Prophylaxis    Above plan discussed with Trauma attending, Dr. Abraham, patient, patient family, and ED team  --------------------------------------------------------------------------------------  06-13-22 @ 23:57

## 2022-06-14 NOTE — PATIENT PROFILE ADULT - FALL HARM RISK - HARM RISK INTERVENTIONS

## 2022-06-14 NOTE — H&P ADULT - ATTENDING COMMENTS
patient seen and evaluated. full trauma workup was done. admit to ICU. IR were consulted for blush near pubic rami. follow hematocrit.

## 2022-06-14 NOTE — H&P ADULT - NSHPLABSRESULTS_GEN_ALL_CORE
Labs:  CAPILLARY BLOOD GLUCOSE  POCT Blood Glucose.: 116 mg/dL (13 Jun 2022 22:08)                      13.2   8.68  )-----------( 156      ( 13 Jun 2022 22:40 )             39.4       Auto Neutrophil %: 75.3 % (06-13-22 @ 22:40)  Auto Immature Granulocyte %: 2.0 % (06-13-22 @ 22:40)    Blood Gas Venous - Lactate: 1.50 mmol/L (06-13-22 @ 22:59)  Lactate, Blood: 1.5 mmol/L (06-13-22 @ 22:40)    Coags:     12.00  ----< 1.04    ( 13 Jun 2022 22:40 )     23.1      Alcohol, Blood: <10 mg/dL (06-13-22 @ 22:40)    RADIOLOGY & ADDITIONAL STUDIES:  < from: CT Cervical Spine No Cont (06.13.22 @ 23:57) >  IMPRESSION:  Head:  No CT evidence for acute intracranial pathology.    Cervical Spine:  No CT evidence for acute cervical spine fracture or subluxation.    --- End of Report ---    JULITO SHETTY MD; Attending Radiologist  This document has been electronically signed. Jun 14 2022 12:23AM  < end of copied text >    < from: CT Abdomen and Pelvis w/ IV Cont (06.14.22 @ 00:17) >  IMPRESSION:  Acute displaced fracture extending from the right superior pubic ramus   through the pubic body and into the right inferior pubic ramus.  Additional acute nondisplaced right inferior pubic ramus fracture.  Acute nondisplaced right sacral fracture.  Hemorrhage in the pelvis which appears essentially extraperitoneal and   spans at least 11.5 x 8.5 cm. Small linear hyperdensity posterior to the   pubic symphysis (series 5, image 541) which may reflect a component of   active bleeding.    Incidental findings detailed in the body the report.    Findings discussed with ED at 12:50 AM    --- End of Report ---    JULITO SHETTY MD; Attending Radiologist  This document has been electronically signed. Jun 14 2022 12:51AM  < end of copied text >

## 2022-06-14 NOTE — CONSULT NOTE ADULT - SUBJECTIVE AND OBJECTIVE BOX
Pt is a 89yo Female s/p fall, sustained multiple pelvic fractures - called by SICU to assess for possible bladder injury. PT seen and examined at bedside with Dr Landeros, daughter at the bedside. As per daughter, patient denies any current pain. Pt had no issues voiding prior to this.     PAST MEDICAL & SURGICAL HISTORY:  No pertinent past medical history      No significant past surgical history    MEDICATIONS  (STANDING):  acetaminophen     Tablet .. 650 milliGRAM(s) Oral every 6 hours  brimonidine 0.2% Ophthalmic Solution 1 Drop(s) Both EYES daily  chlorhexidine 4% Liquid 1 Application(s) Topical <User Schedule>  furosemide    Tablet 20 milliGRAM(s) Oral daily  gabapentin 100 milliGRAM(s) Oral every 8 hours  heparin   Injectable 5000 Unit(s) SubCutaneous every 8 hours  latanoprost 0.005% Ophthalmic Solution 1 Drop(s) Both EYES at bedtime  lidocaine   4% Patch 1 Patch Transdermal daily  metoprolol succinate ER 25 milliGRAM(s) Oral daily  pantoprazole  Injectable 40 milliGRAM(s) IV Push daily  valsartan 80 milliGRAM(s) Oral daily    MEDICATIONS  (PRN):      Allergies    No Known Allergies    Intolerances      SOCIAL HISTORY: No illicit drug use      REVIEW OF SYSTEMS   [x] A ten-point review of systems was otherwise negative except as noted.      Vital Signs Last 24 Hrs  T(C): 36.8 (2022 16:30), Max: 37.1 (2022 22:57)  T(F): 98.3 (2022 16:30), Max: 98.8 (2022 22:57)  HR: 86 (2022 16:30) (82 - 94)  BP: 120/62 (2022 16:30) (107/77 - 144/79)  BP(mean): 85 (2022 16:30) (81 - 85)  RR: 16 (2022 16:30) (16 - 20)  SpO2: 98% (2022 16:30) (85% - 98%)    PHYSICAL EXAM:    GEN: NAD, awake and alert.  SKIN: Good color, non diaphoretic.  HEENT: NC/AT.  RESP: No dyspnea, non-labored breathing. No use of accessory muscles.  CARDIO: +S1/S2  ABDO: Soft, NT/ND, no palpable bladder, no suprapubic tenderness  BACK: No CVAT B/L  : + indwelling hollingsworth catheter draining clear yellow urine.       I&O's Summary    2022 07:01  -  2022 17:27  --------------------------------------------------------  IN: 800 mL / OUT: 280 mL / NET: 520 mL      LABS:                        12.4   15.37 )-----------( 129      ( 2022 12:13 )             36.3     06-13    139  |  104  |  26<H>  ----------------------------<  131<H>  4.9   |  22  |  1.0    Ca    9.3      2022 22:40    TPro  5.9<L>  /  Alb  3.9  /  TBili  0.6  /  DBili  x   /  AST  24  /  ALT  25  /  AlkPhos  65  06-13    PT/INR - ( 2022 22:40 )   PT: 12.00 sec;   INR: 1.04 ratio         PTT - ( 2022 22:40 )  PTT:23.1 sec  Urinalysis Basic - ( 2022 11:04 )    Color: Yellow / Appearance: Clear / S.020 / pH: x  Gluc: x / Ketone: Negative  / Bili: Negative / Urobili: <2 mg/dL   Blood: x / Protein: 100 mg/dL / Nitrite: Negative   Leuk Esterase: Negative / RBC: 5 /HPF / WBC 50 /HPF   Sq Epi: x / Non Sq Epi: 5 /HPF / Bacteria: Negative      RADIOLOGY & ADDITIONAL STUDIES:    c< from: CT Abdomen and Pelvis w/ IV Cont (22 @ 00:17) >  ACC: [id number] EXAM:  CT ABDOMEN AND PELVIS IC                        ACC: [id number] EXAM:  CT CHEST IC                          PROCEDURE DATE:  2022      INTERPRETATION:  CLINICAL STATEMENT: Trauma code. Unwitnessed fall.    TECHNIQUE: Contiguous CT images were obtained of the chest, abdomen and   pelvis.    Intravenous Contrast:  Intravenous contrast administered.  75 cc Omnipaque 350 intravenous contrast was administered.  Oral contrast: was not administered.      COMPARISON:  None    FINDINGS:    CHEST:    LUNGS, PLEURA AND AIRWAYS: No focal consolidation, pleural effusion or   pneumothorax. No evidence for central endobronchial lesion. Bilateral   predominantly dependent subsegmental atelectasis/scarring.    HEART AND VESSELS:Heart size appears within normal limits. No   pericardial effusion is present. Dilated ascending thoracic aorta to 4.4   cm. Descending thoracic aorta measures 3.3 cm. Aorta measures 3.3 cm at   the diaphragmatic hiatus. Calcified and noncalcified plaque within the   aorta. Normal caliber main pulmonary artery.    THORACIC INLET/MEDIASTINUM/LYMPH NODES: The visualized portion of the   thyroid gland is unremarkable. There are no enlarged thoracic lymph nodes.      ABDOMEN/PELVIS:    LIVER: Unremarkable.    SPLEEN: Unremarkable.    PANCREAS: Unremarkable.    GALLBLADDER AND BILIARY TREE: Unremarkable CT appearance of the   gallbladder. No biliary ductal dilatation.    ADRENALS: Unremarkable.    KIDNEYS: Symmetric pattern of renal enhancement. No hydronephrosis. Renal   cysts.    LYMPH NODES: There are no enlarged abdominal or pelvic lymph nodes.    VASCULATURE: Infrarenal abdominal aortic aneurysm measuring 3.7 cm with   the aneurysmal portion predominantly filled with mural thrombus. 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 1.8 cm left   common iliac artery aneurysm.    BOWEL: There is no evidence for bowel obstruction or bowel wall   thickening. Colonic diverticulosis without evidence for diverticulitis.    PERITONEUM/RETROPERITONEUM/MESENTERY: No pneumoperitoneum. Hemorrhage as   below.    PELVIC VISCERA: Urinary bladder is collapsed.    BONES AND SOFT TISSUES: There is an acute displaced fracture extending   from the right superior pubic ramus through thepubic body and into the   right inferior pubic ramus. Additional nondisplaced right inferior pubic   ramus fracture. Nondisplaced right sacral fracture. There is hemorrhage   in the pelvis which appears essentially extraperitoneal and spans at   least 11.5 x 8.5 cm. small linear hyperdensity posterior to the pubic   symphysis (series 5, image 541) for which a component of active bleeding   is not excluded.      IMPRESSION:    Acute displaced fracture extending from the right superior pubic ramus   through the pubic body and into the right inferior pubic ramus.  Additional acute nondisplaced right inferior pubic ramus fracture.  Acute nondisplaced right sacral fracture.  Hemorrhage in the pelvis which appears essentially extraperitoneal and   spans at least 11.5 x 8.5 cm. Small linear hyperdensity posterior to the   pubic symphysis (series 5, image 541) which may reflect a component of   active bleeding.    Incidental findings detailed in the body the report.    Findings discussed with ED at 12:50 AM    --- End of Report ---      JULITO SHETTY MD; Attending Radiologist  This document has been electronically signed. 2022 12:51A        < from: CT Abdomen and Pelvis Cystogram w/ Catheter (22 @ 06:40) >  ACC: [id number] EXAM:  CT ABD PELV CYSTOGRAMEXISTCATH                          PROCEDURE DATE:  2022        INTERPRETATION:  Clinical History / Reason for exam: Trauma. Pelvic   fractures.    Technique: CT abdomen and pelvis without IV contrast (CT cystogram   protocol). CT of the abdomen and pelvis was performed using a CT   cystogram protocol without intravenous contrast.  Pre-contrast images of   the pelvis were performed. A Hollingsworth catheter was inserted prior to the   examination in theradiology suite. The patient was given via a Hollingsworth   catheter Conray solution diluted 10:1 ratio (saline:contrast) and the   patient was rescanned through the abdomen and pelvis.  The contrast was   drained via the Hollingsworth catheter and a post void CT through the pelvis was   performed.    Comparison made with CT abdomen and pelvis 2022.    FINDINGS:    On precontrast images, urinary bladder nondistended with intraluminal   excreted contrast, Hollingsworth catheter. Following administration of contrast   via Hollingsworth catheter, contrast fills the urinary bladder, without evidence   of intraperitoneal or extraperitoneal urinary bladder leak. Contrast   noted within vagina in region of distal urethra and vestibule (series 19,   images 36-39; series 7, images 94-84). Following unclamping of Hollingsworth,   urinary bladder collapsed cyst, without evidence of intraperitoneal or   extraperitoneal leak.    Pelvic, primarily extraperitoneal and pelvic sidewall hemorrhage, similar   to prior CT.    Unchanged fractures of bony pelvis. Unchanged visualized intra-abdominal   organs, aorta, lung bases.    IMPRESSION:    1. Following administration of contrast via Hollingsworth catheter, contrast   present within vagina in region of distal urethra and vestibule.   Considerations include vaginal fistula or reflux of contrast to the   vagina.    2. No evidence of urinary bladder intraperitoneal or extraperitoneal leak.    3. Pelvic, primarily extraperitoneal and pelvic sidewall hemorrhage,   similar to prior CT.    4. Unchanged fractures of bony pelvis.    --- End of Report ---      BETH HAY MD; Attending Radiologist  This document has been electronically signed. 2022  9:24AM     Pt is a 89yo Female s/p fall, sustained multiple pelvic fractures - called by SICU to assess for possible bladder injury. PT seen and examined at bedside with Dr Landeros, daughter at the bedside. As per daughter, patient denies any issues voiding prior to this. Patient indicated that she wants the daughter to act as a  and did not want the  phone    PAST MEDICAL & SURGICAL HISTORY:  No pertinent past medical history      No significant past surgical history    MEDICATIONS  (STANDING):  acetaminophen     Tablet .. 650 milliGRAM(s) Oral every 6 hours  brimonidine 0.2% Ophthalmic Solution 1 Drop(s) Both EYES daily  chlorhexidine 4% Liquid 1 Application(s) Topical <User Schedule>  furosemide    Tablet 20 milliGRAM(s) Oral daily  gabapentin 100 milliGRAM(s) Oral every 8 hours  heparin   Injectable 5000 Unit(s) SubCutaneous every 8 hours  latanoprost 0.005% Ophthalmic Solution 1 Drop(s) Both EYES at bedtime  lidocaine   4% Patch 1 Patch Transdermal daily  metoprolol succinate ER 25 milliGRAM(s) Oral daily  pantoprazole  Injectable 40 milliGRAM(s) IV Push daily  valsartan 80 milliGRAM(s) Oral daily    MEDICATIONS  (PRN):      Allergies    No Known Allergies    Intolerances      SOCIAL HISTORY: No illicit drug use      REVIEW OF SYSTEMS   [x] A ten-point review of systems was otherwise negative except as noted.      Vital Signs Last 24 Hrs  T(C): 36.8 (2022 16:30), Max: 37.1 (2022 22:57)  T(F): 98.3 (2022 16:30), Max: 98.8 (2022 22:57)  HR: 86 (2022 16:30) (82 - 94)  BP: 120/62 (2022 16:30) (107/77 - 144/79)  BP(mean): 85 (2022 16:30) (81 - 85)  RR: 16 (2022 16:30) (16 - 20)  SpO2: 98% (2022 16:30) (85% - 98%)    PHYSICAL EXAM:    GEN: NAD, awake and alert.  SKIN: Good color, non diaphoretic.  HEENT: NC/AT.  RESP: No dyspnea, non-labored breathing. No use of accessory muscles.  CARDIO: +S1/S2  ABDO: Soft, NT/ND, no palpable bladder, no suprapubic tenderness  BACK: No CVAT B/L  : + indwelling hollingsworth catheter draining clear yellow urine.       I&O's Summary    2022 07:01  -  2022 17:27  --------------------------------------------------------  IN: 800 mL / OUT: 280 mL / NET: 520 mL      LABS:                        12.4   15.37 )-----------( 129      ( 2022 12:13 )             36.3     06-13    139  |  104  |  26<H>  ----------------------------<  131<H>  4.9   |  22  |  1.0    Ca    9.3      2022 22:40    TPro  5.9<L>  /  Alb  3.9  /  TBili  0.6  /  DBili  x   /  AST  24  /  ALT  25  /  AlkPhos  65  06-13    PT/INR - ( 2022 22:40 )   PT: 12.00 sec;   INR: 1.04 ratio         PTT - ( 2022 22:40 )  PTT:23.1 sec  Urinalysis Basic - ( 2022 11:04 )    Color: Yellow / Appearance: Clear / S.020 / pH: x  Gluc: x / Ketone: Negative  / Bili: Negative / Urobili: <2 mg/dL   Blood: x / Protein: 100 mg/dL / Nitrite: Negative   Leuk Esterase: Negative / RBC: 5 /HPF / WBC 50 /HPF   Sq Epi: x / Non Sq Epi: 5 /HPF / Bacteria: Negative      RADIOLOGY & ADDITIONAL STUDIES:    c< from: CT Abdomen and Pelvis w/ IV Cont (22 @ 00:17) >  ACC: [id number] EXAM:  CT ABDOMEN AND PELVIS IC                        ACC: [id number] EXAM:  CT CHEST IC                          PROCEDURE DATE:  2022      INTERPRETATION:  CLINICAL STATEMENT: Trauma code. Unwitnessed fall.    TECHNIQUE: Contiguous CT images were obtained of the chest, abdomen and   pelvis.    Intravenous Contrast:  Intravenous contrast administered.  75 cc Omnipaque 350 intravenous contrast was administered.  Oral contrast: was not administered.      COMPARISON:  None    FINDINGS:    CHEST:    LUNGS, PLEURA AND AIRWAYS: No focal consolidation, pleural effusion or   pneumothorax. No evidence for central endobronchial lesion. Bilateral   predominantly dependent subsegmental atelectasis/scarring.    HEART AND VESSELS:Heart size appears within normal limits. No   pericardial effusion is present. Dilated ascending thoracic aorta to 4.4   cm. Descending thoracic aorta measures 3.3 cm. Aorta measures 3.3 cm at   the diaphragmatic hiatus. Calcified and noncalcified plaque within the   aorta. Normal caliber main pulmonary artery.    THORACIC INLET/MEDIASTINUM/LYMPH NODES: The visualized portion of the   thyroid gland is unremarkable. There are no enlarged thoracic lymph nodes.      ABDOMEN/PELVIS:    LIVER: Unremarkable.    SPLEEN: Unremarkable.    PANCREAS: Unremarkable.    GALLBLADDER AND BILIARY TREE: Unremarkable CT appearance of the   gallbladder. No biliary ductal dilatation.    ADRENALS: Unremarkable.    KIDNEYS: Symmetric pattern of renal enhancement. No hydronephrosis. Renal   cysts.    LYMPH NODES: There are no enlarged abdominal or pelvic lymph nodes.    VASCULATURE: Infrarenal abdominal aortic aneurysm measuring 3.7 cm with   the aneurysmal portion predominantly filled with mural thrombus. 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 1.8 cm left   common iliac artery aneurysm.    BOWEL: There is no evidence for bowel obstruction or bowel wall   thickening. Colonic diverticulosis without evidence for diverticulitis.    PERITONEUM/RETROPERITONEUM/MESENTERY: No pneumoperitoneum. Hemorrhage as   below.    PELVIC VISCERA: Urinary bladder is collapsed.    BONES AND SOFT TISSUES: There is an acute displaced fracture extending   from the right superior pubic ramus through thepubic body and into the   right inferior pubic ramus. Additional nondisplaced right inferior pubic   ramus fracture. Nondisplaced right sacral fracture. There is hemorrhage   in the pelvis which appears essentially extraperitoneal and spans at   least 11.5 x 8.5 cm. small linear hyperdensity posterior to the pubic   symphysis (series 5, image 541) for which a component of active bleeding   is not excluded.      IMPRESSION:    Acute displaced fracture extending from the right superior pubic ramus   through the pubic body and into the right inferior pubic ramus.  Additional acute nondisplaced right inferior pubic ramus fracture.  Acute nondisplaced right sacral fracture.  Hemorrhage in the pelvis which appears essentially extraperitoneal and   spans at least 11.5 x 8.5 cm. Small linear hyperdensity posterior to the   pubic symphysis (series 5, image 541) which may reflect a component of   active bleeding.    Incidental findings detailed in the body the report.    Findings discussed with ED at 12:50 AM    --- End of Report ---      JULITO SHETTY MD; Attending Radiologist  This document has been electronically signed. 2022 12:51A        < from: CT Abdomen and Pelvis Cystogram w/ Catheter (22 @ 06:40) >  ACC: [id number] EXAM:  CT ABD PELV CYSTOGRAMEXISTCATH                          PROCEDURE DATE:  2022        INTERPRETATION:  Clinical History / Reason for exam: Trauma. Pelvic   fractures.    Technique: CT abdomen and pelvis without IV contrast (CT cystogram   protocol). CT of the abdomen and pelvis was performed using a CT   cystogram protocol without intravenous contrast.  Pre-contrast images of   the pelvis were performed. A Hollingsworth catheter was inserted prior to the   examination in theradiology suite. The patient was given via a Hollingsworth   catheter Conray solution diluted 10:1 ratio (saline:contrast) and the   patient was rescanned through the abdomen and pelvis.  The contrast was   drained via the Hollingsworth catheter and a post void CT through the pelvis was   performed.    Comparison made with CT abdomen and pelvis 2022.    FINDINGS:    On precontrast images, urinary bladder nondistended with intraluminal   excreted contrast, Hollingsworth catheter. Following administration of contrast   via Hollingsworth catheter, contrast fills the urinary bladder, without evidence   of intraperitoneal or extraperitoneal urinary bladder leak. Contrast   noted within vagina in region of distal urethra and vestibule (series 19,   images 36-39; series 7, images 94-84). Following unclamping of Hollingsworth,   urinary bladder collapsed cyst, without evidence of intraperitoneal or   extraperitoneal leak.    Pelvic, primarily extraperitoneal and pelvic sidewall hemorrhage, similar   to prior CT.    Unchanged fractures of bony pelvis. Unchanged visualized intra-abdominal   organs, aorta, lung bases.    IMPRESSION:    1. Following administration of contrast via Hollingsworth catheter, contrast   present within vagina in region of distal urethra and vestibule.   Considerations include vaginal fistula or reflux of contrast to the   vagina.    2. No evidence of urinary bladder intraperitoneal or extraperitoneal leak.    3. Pelvic, primarily extraperitoneal and pelvic sidewall hemorrhage,   similar to prior CT.    4. Unchanged fractures of bony pelvis.    --- End of Report ---      BETH HAY MD; Attending Radiologist  This document has been electronically signed. 2022  9:24AM

## 2022-06-14 NOTE — CONSULT NOTE ADULT - SUBJECTIVE AND OBJECTIVE BOX
KIN HERNANDEZ     89y Female    MRN-[id number]    Admit Date: 06-14-22  ICU Admission Date: 6/14          ============================  HPI   HPI:  TRAUMA ACTIVATION LEVEL:  CODE / ALERT  / CONSULT  ACTIVATED BY: EMS**  /  ED  INTUBATED: YES** / NO    MECHANISM OF INJURY:   [] Blunt     [] MVC	  [X] Fall	    GCS: 15 	E: 4	V: 5	M: 6    HPI:  "89F w/ PMHx of HTN, HLD, cardiac arrhythmia, and CAD seen as Trauma Alert s/p mechanical fall w/ ?HT, ?LOC, -AC (although pt does not remember the fall). Fall was witnessed by daughter this evening, when she fell down some stairs onto her right side. Trauma assessment in ED: ABCs intact, GCS 15. "    SICU CONSULT - 6/14/22  Patient seen bedside in ED, daughter bedside and translating, patient in NAD.  Patient fell while going down stairs, on the last step, witnessed by daughter, fell on right side, able to get up with assistance and brought to bed, brought to ED 2/2 persistent severe pain.  In ED AF, HR 84, /79, saturating well on RA.  No obvious external signs of trauma.  Underwent pan-scan and found to have on CTCAP R superior/inferior pubic rami fx, R sacral ala compression fx, and pelvic hemorrhage appearing extraperitoneal and measuring 11.5x8.5cm.  On CT, there is a small linear hyperdensity posterior to the pubic symphysis which may reflect a component of active bleeding, for which SICU was consulted for hemodynamic monitoring and Q1H vitals.  On exam, patient moving all extremities, no focal neurologic deficits, abdomen s/nd/nt, right hip without any palpable hematomas or visible areas of ecchymosis.  Orthopedic surgery consulted and no acute surgical intervention needed. CT cystogram pending to rule out bladder injury.      PAST MEDICAL & SURGICAL HISTORY:  HTN  HLD  arrhythmia  CAD  dilated aorta  cholecystectomy    Allergies  No Known Allergies (14 Jun 2022 01:16)       24 Hour Events  -Admission under SICU service        [X] A ten-point review of systems was otherwise negative except as noted above.  [  ] Due to altered mental status/intubation, subjective information was not attained from the patient. History was obtained, to the extent possible, from review of the chart and collateral sources of information.    ==========================    PMH  PAST MEDICAL & SURGICAL HISTORY:  No pertinent past medical history      No significant past surgical history          Home Meds:  Home Medications:  atorvastatin 10 mg tablet:  (14 Jun 2022 01:26)  brimonidine 0.15 % eye drops:  (14 Jun 2022 01:26)  furosemide 20 mg tablet:  (14 Jun 2022 01:26)  latanoprost 0.005 % eye drops:  (14 Jun 2022 01:26)  METOPROLOL SUCC ER 25 MG TAB: TAKE 1 TABLET BY MOUTH EVERY DAY (14 Jun 2022 01:26)  PREDNISONE 5 MG TABLET: TAKE 2 TABLETS BY MOUTH ONCE A DAY (14 Jun 2022 01:26)  valsartan 80 mg tablet:  (14 Jun 2022 01:26)       Allergies  Allergies    No Known Allergies    Intolerances    Current Medications:  brimonidine 0.2% Ophthalmic Solution 1 Drop(s) Both EYES daily  chlorhexidine 4% Liquid 1 Application(s) Topical <User Schedule>  furosemide    Tablet 20 milliGRAM(s) Oral daily  lactated ringers. 1000 milliLiter(s) (100 mL/Hr) IV Continuous <Continuous>  latanoprost 0.005% Ophthalmic Solution 1 Drop(s) Both EYES at bedtime  metoprolol succinate ER 25 milliGRAM(s) Oral daily  pantoprazole  Injectable 40 milliGRAM(s) IV Push daily  valsartan 80 milliGRAM(s) Oral daily      VITAL SIGNS, INS/OUTS (Last 24hours):    ICU Vital Signs Last 24 Hrs  T(C): 37.1 (13 Jun 2022 22:57), Max: 37.1 (13 Jun 2022 22:57)  T(F): 98.8 (13 Jun 2022 22:57), Max: 98.8 (13 Jun 2022 22:57)  HR: 84 (13 Jun 2022 22:57) (84 - 84)  BP: 144/79 (13 Jun 2022 22:57) (144/79 - 144/79)  BP(mean): --  ABP: --  ABP(mean): --  RR: 20 (13 Jun 2022 22:57) (20 - 20)  SpO2: 98% (13 Jun 2022 22:57) (98% - 98%)    I&O's Summary    Physical Exam:   ----------------------------------------------------------------------------------------------------------  GCS:    15  Exam: A&Ox3, no focal deficits    RESPIRATORY:  Normal expansion/effort    CARDIOVASCULAR:   S1/S2.  RRR  No peripheral edema    GASTROINTESTINAL:  Abdomen soft, non-tender, non-distended    MUSCULOSKELETAL:  Moving all extremities, no focal neurologic deficits, abdomen s/nd/nt, right hip without any palpable hematomas or visible areas of ecchymosis.    DERM:  No skin breakdown

## 2022-06-14 NOTE — H&P ADULT - HISTORY OF PRESENT ILLNESS
TRAUMA ACTIVATION LEVEL:  CODE / ALERT  / CONSULT  ACTIVATED BY: EMS**  /  ED  INTUBATED: YES** / NO    MECHANISM OF INJURY:   [] Blunt     [] MVC	  [X] Fall	    GCS: 15 	E: 4	V: 5	M: 6    HPI:  89F w/ PMHx of HTN, HLD, cardiac arrhythmia, and CAD seen as Trauma Alert s/p mechanical fall w/ ?HT, ?LOC, -AC (although pt does not remember the fall). Fall was witnessed by daughter this evening, when she fell down some stairs onto her right side. Trauma assessment in ED: ABCs intact, GCS 15.     PAST MEDICAL & SURGICAL HISTORY:  HTN  HLD  arrhythmia  CAD  dilated aorta  cholecystectomy    Allergies  No Known Allergies

## 2022-06-15 LAB
ANION GAP SERPL CALC-SCNC: 12 MMOL/L — SIGNIFICANT CHANGE UP (ref 7–14)
BUN SERPL-MCNC: 25 MG/DL — HIGH (ref 10–20)
CALCIUM SERPL-MCNC: 9.1 MG/DL — SIGNIFICANT CHANGE UP (ref 8.5–10.1)
CHLORIDE SERPL-SCNC: 105 MMOL/L — SIGNIFICANT CHANGE UP (ref 98–110)
CO2 SERPL-SCNC: 25 MMOL/L — SIGNIFICANT CHANGE UP (ref 17–32)
CREAT SERPL-MCNC: 1.1 MG/DL — SIGNIFICANT CHANGE UP (ref 0.7–1.5)
EGFR: 47 ML/MIN/1.73M2 — LOW
GLUCOSE SERPL-MCNC: 135 MG/DL — HIGH (ref 70–99)
HCT VFR BLD CALC: 31.3 % — LOW (ref 37–47)
HCT VFR BLD CALC: 33.6 % — LOW (ref 37–47)
HGB BLD-MCNC: 10.5 G/DL — LOW (ref 12–16)
HGB BLD-MCNC: 11.3 G/DL — LOW (ref 12–16)
MAGNESIUM SERPL-MCNC: 1.9 MG/DL — SIGNIFICANT CHANGE UP (ref 1.8–2.4)
MCHC RBC-ENTMCNC: 30.6 PG — SIGNIFICANT CHANGE UP (ref 27–31)
MCHC RBC-ENTMCNC: 31.1 PG — HIGH (ref 27–31)
MCHC RBC-ENTMCNC: 33.5 G/DL — SIGNIFICANT CHANGE UP (ref 32–37)
MCHC RBC-ENTMCNC: 33.6 G/DL — SIGNIFICANT CHANGE UP (ref 32–37)
MCV RBC AUTO: 91.1 FL — SIGNIFICANT CHANGE UP (ref 81–99)
MCV RBC AUTO: 92.6 FL — SIGNIFICANT CHANGE UP (ref 81–99)
NRBC # BLD: 0 /100 WBCS — SIGNIFICANT CHANGE UP (ref 0–0)
NRBC # BLD: 0 /100 WBCS — SIGNIFICANT CHANGE UP (ref 0–0)
PHOSPHATE SERPL-MCNC: 3.1 MG/DL — SIGNIFICANT CHANGE UP (ref 2.1–4.9)
PLATELET # BLD AUTO: 106 K/UL — LOW (ref 130–400)
PLATELET # BLD AUTO: 123 K/UL — LOW (ref 130–400)
POTASSIUM SERPL-MCNC: 4.2 MMOL/L — SIGNIFICANT CHANGE UP (ref 3.5–5)
POTASSIUM SERPL-SCNC: 4.2 MMOL/L — SIGNIFICANT CHANGE UP (ref 3.5–5)
RBC # BLD: 3.38 M/UL — LOW (ref 4.2–5.4)
RBC # BLD: 3.69 M/UL — LOW (ref 4.2–5.4)
RBC # FLD: 13.3 % — SIGNIFICANT CHANGE UP (ref 11.5–14.5)
RBC # FLD: 13.3 % — SIGNIFICANT CHANGE UP (ref 11.5–14.5)
SODIUM SERPL-SCNC: 142 MMOL/L — SIGNIFICANT CHANGE UP (ref 135–146)
WBC # BLD: 11.1 K/UL — HIGH (ref 4.8–10.8)
WBC # BLD: 8.96 K/UL — SIGNIFICANT CHANGE UP (ref 4.8–10.8)
WBC # FLD AUTO: 11.1 K/UL — HIGH (ref 4.8–10.8)
WBC # FLD AUTO: 8.96 K/UL — SIGNIFICANT CHANGE UP (ref 4.8–10.8)

## 2022-06-15 PROCEDURE — 93010 ELECTROCARDIOGRAM REPORT: CPT

## 2022-06-15 PROCEDURE — 99232 SBSQ HOSP IP/OBS MODERATE 35: CPT

## 2022-06-15 PROCEDURE — 71045 X-RAY EXAM CHEST 1 VIEW: CPT | Mod: 26

## 2022-06-15 RX ORDER — OLANZAPINE 15 MG/1
2.5 TABLET, FILM COATED ORAL ONCE
Refills: 0 | Status: COMPLETED | OUTPATIENT
Start: 2022-06-15 | End: 2022-06-15

## 2022-06-15 RX ORDER — SENNA PLUS 8.6 MG/1
2 TABLET ORAL AT BEDTIME
Refills: 0 | Status: DISCONTINUED | OUTPATIENT
Start: 2022-06-15 | End: 2022-06-21

## 2022-06-15 RX ORDER — POLYETHYLENE GLYCOL 3350 17 G/17G
17 POWDER, FOR SOLUTION ORAL EVERY 12 HOURS
Refills: 0 | Status: DISCONTINUED | OUTPATIENT
Start: 2022-06-15 | End: 2022-06-21

## 2022-06-15 RX ORDER — QUETIAPINE FUMARATE 200 MG/1
25 TABLET, FILM COATED ORAL EVERY 12 HOURS
Refills: 0 | Status: DISCONTINUED | OUTPATIENT
Start: 2022-06-15 | End: 2022-06-16

## 2022-06-15 RX ORDER — METOPROLOL TARTRATE 50 MG
5 TABLET ORAL ONCE
Refills: 0 | Status: COMPLETED | OUTPATIENT
Start: 2022-06-15 | End: 2022-06-15

## 2022-06-15 RX ORDER — VALSARTAN 80 MG/1
80 TABLET ORAL DAILY
Refills: 0 | Status: DISCONTINUED | OUTPATIENT
Start: 2022-06-15 | End: 2022-06-16

## 2022-06-15 RX ORDER — MAGNESIUM SULFATE 500 MG/ML
1 VIAL (ML) INJECTION ONCE
Refills: 0 | Status: COMPLETED | OUTPATIENT
Start: 2022-06-15 | End: 2022-06-15

## 2022-06-15 RX ADMIN — HEPARIN SODIUM 5000 UNIT(S): 5000 INJECTION INTRAVENOUS; SUBCUTANEOUS at 06:56

## 2022-06-15 RX ADMIN — GABAPENTIN 100 MILLIGRAM(S): 400 CAPSULE ORAL at 23:12

## 2022-06-15 RX ADMIN — POLYETHYLENE GLYCOL 3350 17 GRAM(S): 17 POWDER, FOR SOLUTION ORAL at 11:58

## 2022-06-15 RX ADMIN — CHLORHEXIDINE GLUCONATE 1 APPLICATION(S): 213 SOLUTION TOPICAL at 06:23

## 2022-06-15 RX ADMIN — Medication 650 MILLIGRAM(S): at 11:56

## 2022-06-15 RX ADMIN — HEPARIN SODIUM 5000 UNIT(S): 5000 INJECTION INTRAVENOUS; SUBCUTANEOUS at 23:13

## 2022-06-15 RX ADMIN — GABAPENTIN 100 MILLIGRAM(S): 400 CAPSULE ORAL at 06:55

## 2022-06-15 RX ADMIN — Medication 650 MILLIGRAM(S): at 18:26

## 2022-06-15 RX ADMIN — Medication 20 MILLIGRAM(S): at 06:54

## 2022-06-15 RX ADMIN — Medication 50 GRAM(S): at 11:55

## 2022-06-15 RX ADMIN — SENNA PLUS 2 TABLET(S): 8.6 TABLET ORAL at 23:12

## 2022-06-15 RX ADMIN — LIDOCAINE 1 PATCH: 4 CREAM TOPICAL at 11:55

## 2022-06-15 RX ADMIN — HEPARIN SODIUM 5000 UNIT(S): 5000 INJECTION INTRAVENOUS; SUBCUTANEOUS at 00:21

## 2022-06-15 RX ADMIN — VALSARTAN 80 MILLIGRAM(S): 80 TABLET ORAL at 11:56

## 2022-06-15 RX ADMIN — Medication 650 MILLIGRAM(S): at 00:22

## 2022-06-15 RX ADMIN — GABAPENTIN 100 MILLIGRAM(S): 400 CAPSULE ORAL at 00:21

## 2022-06-15 RX ADMIN — Medication 5 MILLIGRAM(S): at 02:15

## 2022-06-15 RX ADMIN — BRIMONIDINE TARTRATE 1 DROP(S): 2 SOLUTION/ DROPS OPHTHALMIC at 00:00

## 2022-06-15 RX ADMIN — HEPARIN SODIUM 5000 UNIT(S): 5000 INJECTION INTRAVENOUS; SUBCUTANEOUS at 18:29

## 2022-06-15 RX ADMIN — OLANZAPINE 2.5 MILLIGRAM(S): 15 TABLET, FILM COATED ORAL at 00:45

## 2022-06-15 RX ADMIN — Medication 650 MILLIGRAM(S): at 12:30

## 2022-06-15 RX ADMIN — VALSARTAN 80 MILLIGRAM(S): 80 TABLET ORAL at 06:54

## 2022-06-15 RX ADMIN — Medication 650 MILLIGRAM(S): at 23:12

## 2022-06-15 RX ADMIN — QUETIAPINE FUMARATE 25 MILLIGRAM(S): 200 TABLET, FILM COATED ORAL at 18:26

## 2022-06-15 RX ADMIN — Medication 25 MILLIGRAM(S): at 06:55

## 2022-06-15 RX ADMIN — LATANOPROST 1 DROP(S): 0.05 SOLUTION/ DROPS OPHTHALMIC; TOPICAL at 23:13

## 2022-06-15 RX ADMIN — Medication 650 MILLIGRAM(S): at 06:55

## 2022-06-15 RX ADMIN — LATANOPROST 1 DROP(S): 0.05 SOLUTION/ DROPS OPHTHALMIC; TOPICAL at 00:00

## 2022-06-15 NOTE — CONSULT NOTE ADULT - ASSESSMENT
IMPRESSION: Rehab of right pelvic fx, s/p fall    PRECAUTIONS: [   ] Cardiac  [   ] Respiratory  [   ] Seizures [   ] Contact Isolation  [   ] Droplet Isolation  [   ] Other    Weight Bearing Status:     RECOMMENDATION:    Out of Bed to Chair     DVT/Decubiti Prophylaxis    REHAB PLAN:     [ x   ] Bedside P/T 3-5 times a week   [   x ]   Bedside O/T  2-3 times a week             [    ] Speech Therapy               [    ]  No Rehab Therapy Indicated   Conditioning/ROM                                    ADL  Bed Mobility                                               Conditioning/ROM  Transfers                                                     Bed Mobility  Sitting /Standing Balance                         Transfers                                        Gait Training                                               Sitting/Standing Balance  Stair Training [   ]Applicable                    Home equipment Eval                                                                        Splinting  [   ] Only      GOALS:   ADL   [  x  ]   Independent                    Transfers  [  x  ] Independent                          Ambulation  [  x  ] Independent     [   x  ] With device                            [    ]  CG                                                         [    ]  CG                                                                  [    ] CG                            [    ] Min A                                                   [    ] Min A                                                              [    ] Min  A          DISCHARGE PLAN:   [    ]  Good candidate for Intensive Rehabilitation/Hospital based                                             Will tolerate 3hrs Intensive Rehab Daily                                       [     ]  Short Term Rehab in Skilled Nursing Facility                                       [     ]  Home with Outpatient or  services                                         [   x  ]  Possible Candidate for Intensive Hospital based Rehab

## 2022-06-15 NOTE — CONSULT NOTE ADULT - SUBJECTIVE AND OBJECTIVE BOX
HPI:  TRAUMA ACTIVATION LEVEL:  CODE / ALERT  / CONSULT  ACTIVATED BY: EMS**  /  ED  INTUBATED: YES** / NO    MECHANISM OF INJURY:   [] Blunt     [] MVC	  [X] Fall	    GCS: 15 	E: 4	V: 5	M: 6    HPI:  89F w/ PMHx of HTN, HLD, cardiac arrhythmia, and CAD seen as Trauma Alert s/p mechanical fall w/ ?HT, ?LOC, -AC (although pt does not remember the fall). Fall was witnessed by daughter this evening, when she fell down some stairs onto her right side. Trauma assessment in ED: ABCs intact, GCS 15.   Injuries: R superior/inferior pubic rami fx, R sacral fracture- Ortho: WBAT    PAST MEDICAL & SURGICAL HISTORY:  HTN  HLD  arrhythmia  CAD  dilated aorta  cholecystectomy    Allergies  No Known Allergies (2022 01:16)      PAST MEDICAL & SURGICAL HISTORY:  No pertinent past medical history      No significant past surgical history          Hospital Course:    TODAY'S SUBJECTIVE & REVIEW OF SYMPTOMS:     Constitutional WNL   Cardio WNL   Resp WNL   GI WNL  Heme WNL  Endo WNL  Skin WNL  MSK pain  Neuro WNL  Cognitive WNL  Psych WNL      MEDICATIONS  (STANDING):  acetaminophen     Tablet .. 650 milliGRAM(s) Oral every 6 hours  brimonidine 0.2% Ophthalmic Solution 1 Drop(s) Both EYES daily  chlorhexidine 4% Liquid 1 Application(s) Topical <User Schedule>  furosemide    Tablet 20 milliGRAM(s) Oral daily  gabapentin 100 milliGRAM(s) Oral every 8 hours  heparin   Injectable 5000 Unit(s) SubCutaneous every 8 hours  latanoprost 0.005% Ophthalmic Solution 1 Drop(s) Both EYES at bedtime  lidocaine   4% Patch 1 Patch Transdermal daily  metoprolol succinate ER 25 milliGRAM(s) Oral daily  polyethylene glycol 3350 17 Gram(s) Oral every 12 hours  predniSONE   Tablet 5 milliGRAM(s) Oral daily  QUEtiapine 25 milliGRAM(s) Oral every 12 hours  senna 2 Tablet(s) Oral at bedtime  valsartan 80 milliGRAM(s) Oral daily    MEDICATIONS  (PRN):      FAMILY HISTORY:      Allergies    No Known Allergies    Intolerances        SOCIAL HISTORY:    [  ] Etoh  [  ] Smoking  [  ] Substance abuse     Home Environment:  [   ] Home Alone  [  x ] Lives with Family  [   ] Home Health Aid    Dwelling:  [   ] Apartment  [ x  ] Private House  [   ] Adult Home  [   ] Skilled Nursing Facility      [   ] Short Term  [   ] Long Term  [  x ] Stairs       Elevator [   ]    FUNCTIONAL STATUS PTA: (Check all that apply)  Ambulation: [ x   ]Independent    [   ] Dependent     [   ] Non-Ambulatory  Assistive Device: [   ] SA Cane  [   ]  Q Cane  [   ] Walker  [   ]  Wheelchair  ADL : [  x ] Independent  [    ]  Dependent       Vital Signs Last 24 Hrs  T(C): 37.1 (15 Darryl 2022 15:51), Max: 37.1 (2022 21:00)  T(F): 98.7 (15 Darryl 2022 15:51), Max: 98.7 (2022 21:00)  HR: 84 (15 Darryl 2022 12:15) (84 - 126)  BP: 103/59 (15 Darryl 2022 12:15) (96/68 - 155/100)  BP(mean): 75 (15 Darryl 2022 12:15) (75 - 115)  RR: --  SpO2: 97% (15 Darryl 2022 12:15) (91% - 97%)      PHYSICAL EXAM: Awake & Alert  GENERAL: NAD  HEAD:  Normocephalic  CHEST/LUNG: Clear   HEART: S1S2+  ABDOMEN: Soft, Nontender  EXTREMITIES:  no calf tenderness    NERVOUS SYSTEM:  Cranial Nerves 2-12 intact [   ] Abnormal  [   ]  ROM: WFL all extremities [   ]  Abnormal [ x  ]limited RLE  Motor Strength: WFL all extremities  [   ]  Abnormal [ x  ]limited RLE  Sensation: intact to light touch [ x  ] Abnormal [   ]    FUNCTIONAL STATUS:  Bed Mobility: Independent [   ]  Supervision [   ]  Needs Assistance [ x  ]  N/A [   ]  Transfers: Independent [   ]  Supervision [   ]  Needs Assistance [   ]  N/A [   ]   Ambulation: Independent [   ]  Supervision [   ]  Needs Assistance [   ]  N/A [   ]  ADL: Independent [   ] Requires Assistance [   ] N/A [   ]      LABS:                        10.5   8.96  )-----------( 106      ( 15 Darryl 2022 12:54 )             31.3     06-15    142  |  105  |  25<H>  ----------------------------<  135<H>  4.2   |  25  |  1.1    Ca    9.1      15 Darryl 2022 02:45  Phos  3.1     06-15  Mg     1.9     06-15    TPro  5.9<L>  /  Alb  3.9  /  TBili  0.6  /  DBili  x   /  AST  24  /  ALT  25  /  AlkPhos  65  06-13    PT/INR - ( 2022 22:40 )   PT: 12.00 sec;   INR: 1.04 ratio         PTT - ( 2022 22:40 )  PTT:23.1 sec  Urinalysis Basic - ( 2022 11:04 )    Color: Yellow / Appearance: Clear / S.020 / pH: x  Gluc: x / Ketone: Negative  / Bili: Negative / Urobili: <2 mg/dL   Blood: x / Protein: 100 mg/dL / Nitrite: Negative   Leuk Esterase: Negative / RBC: 5 /HPF / WBC 50 /HPF   Sq Epi: x / Non Sq Epi: 5 /HPF / Bacteria: Negative        RADIOLOGY & ADDITIONAL STUDIES:

## 2022-06-15 NOTE — PROGRESS NOTE ADULT - ASSESSMENT
89F w/ PMHx of HTN, HLD, cardiac arrhythmia, and CAD seen as Trauma Alert s/p mechanical fall w/ ?HT, ?LOC, -AC, no external signs of trauma. Trauma assessment in ED: ABCs intact, GCS 15, LIPSCOMB.     Injuries identified:   - Acute displaced fracture extending from the right superior pubic ramus through the pubic body and into the right inferior pubic ramus.  - Additional acute nondisplaced right inferior pubic ramus fracture.  - Acute nondisplaced right sacral fracture.  - Hemorrhage in the pelvis which appears essentially extraperitoneal and spans at least 11.5 x 8.5 cm which may reflect a component of active bleeding.    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

## 2022-06-15 NOTE — PHYSICAL THERAPY INITIAL EVALUATION ADULT - SPECIFY REASON(S)
Pt has no act order and not appropriate for PT as per FLACO Weinstein due to very confused, agitated and not following instruc. PT will f/u when appropriate.

## 2022-06-15 NOTE — PROGRESS NOTE ADULT - ATTENDING COMMENTS
89F s/p witnessed mechanical fall at home, ?HT, -LOC, -AC, found to have R superior/inferior pubic rami fx, R sacral ala compression fx, and pelvic hemorrhage appearing extraperitoneal and measuring 11.5x8.5cm.  On CT, there is a small linear hyperdensity posterior to the pubic symphysis which may reflect a component of active bleeding, for which SICU was consulted for hemodynamic monitoring and Q1H vitals.        #Acute pain    - Tylenol, lidocaine, gabapentin, tylenol    #delirium, pain and hospital   -d/c'd Dilaudid, became combative and agitated, and desatting to 85 while sleeping  - given Zyprexa 2.5x2  - start PO Seroquel if taking PO   - frequent reorienting      #respiratory insufficiency     -on NC  No chronic issues      CARDS:   Hx of CAD, HTN, HLD, cardiac arrhythmia  Rx-furosemide    Tablet 20 daily  metoprolol succinate ER 25 daily  valsartan 80 daily     Diet: Dash  #GI Prophylaxis    -not indicated  #Bowel regimen    -senna/miralax  - KUB: non-obstructive         Monitor UO- Larson   IVF: LR @ 100, IVL once tolerating diet    Labs:          BUN/Cr- 26/1.0  --> 25/1.1          Electrolytes-Na 142 // K 4.2 // Phos 3.1 //  Mg 1.9 -hypomagnesemia repleted   #Pelvic hemorrhage measuring 11.5x8.5cm   -Per IR, no acute intervention  CT cysto:  vaginal fistula or reflux of contrast to the vagina  -f/u Urology            # DVT prophylaxis-HSQ started, SCDs    Labs: Hb/Hct:  12.3/36.3  -->,  12.4/36.3  -->,  11.3/33.6  --> 11am cbc               #leukocytosis   WBC- 15.17  --->>,  15.91  --->>,  15.37  --->> 11.1  afebrile           #R superior/inferior pubic rami fx, R sacral ala compression fx   -Orthopedic surgery: No acute intervention  - hgb stable   - continue hsq   - pt

## 2022-06-15 NOTE — CONSULT NOTE ADULT - CONSULT REASON
right LC1 pelvis fracture
s/p fall, bladder injury vs fistula
Hemodynamic monitoring in the setting of pelvic hemorrhage, hip fx.
pelvic fx
trauma alert s/p mechanical fall w/ +HT, -LOC, -AC

## 2022-06-15 NOTE — PROGRESS NOTE ADULT - SUBJECTIVE AND OBJECTIVE BOX
GENERAL SURGERY PROGRESS NOTE    Patient: KIN HERNANDEZ , 89y (29)Female   MRN: [id number]  Location: [address]  Visit: 22 Inpatient  Date: 06-15-22 @ 08:23    Hospital Day #: 3  Post-Op Day #:    Procedure/Dx/Injuries: R superior/inferior pubic rami fx, R sacral fracture    Events of past 24 hours: Agitated overnight, refusing labs, got zyprexa 2.5 x2. Was tachy to 120, Metoprolol 5 IV given, EKG sinus tachy, HR now in 90s.    PAST MEDICAL & SURGICAL HISTORY:  No pertinent past medical history      No significant past surgical history          Vitals:   T(F): 98 (06-15-22 @ 08:11), Max: 98.7 (22 @ 21:00)  HR: 98 (06-15-22 @ 05:34)  BP: 96/68 (06-15-22 @ 05:34)  RR: 16 (22 @ 16:30)  SpO2: 94% (06-15-22 @ 05:34)      Diet, DASH/TLC:   Sodium & Cholesterol Restricted      Fluids:     I & O's:    22 @ 07:01  -  06-15-22 @ 07:00  --------------------------------------------------------  IN:    Lactated Ringers: 800 mL  Total IN: 800 mL    OUT:    Indwelling Catheter - Urethral (mL): 505 mL  Total OUT: 505 mL    Total NET: 295 mL        Bowel Movement: : [] YES [] NO  Flatus: : [] YES [] NO    PHYSICAL EXAM:  General: NAD, AAOx3, calm and cooperative  HEENT: NCAT, ROSA, EOMI, Trachea ML, Neck supple  Cardiac: RRR S1, S2, no Murmurs, rubs or gallops  Respiratory: CTAB, normal respiratory effort, breath sounds equal BL, no wheeze, rhonchi or crackles  Abdomen: Soft, non-distended, non-tender  Extremities: LIPSCOMB, warm, well-perfused, sensation intact b/l LE    MEDICATIONS  (STANDING):  acetaminophen     Tablet .. 650 milliGRAM(s) Oral every 6 hours  brimonidine 0.2% Ophthalmic Solution 1 Drop(s) Both EYES daily  chlorhexidine 4% Liquid 1 Application(s) Topical <User Schedule>  furosemide    Tablet 20 milliGRAM(s) Oral daily  gabapentin 100 milliGRAM(s) Oral every 8 hours  heparin   Injectable 5000 Unit(s) SubCutaneous every 8 hours  latanoprost 0.005% Ophthalmic Solution 1 Drop(s) Both EYES at bedtime  lidocaine   4% Patch 1 Patch Transdermal daily  magnesium sulfate  IVPB 1 Gram(s) IV Intermittent once  metoprolol succinate ER 25 milliGRAM(s) Oral daily  pantoprazole  Injectable 40 milliGRAM(s) IV Push daily  valsartan 80 milliGRAM(s) Oral daily    MEDICATIONS  (PRN):      DVT PROPHYLAXIS: heparin   Injectable 5000 Unit(s) SubCutaneous every 8 hours    GI PROPHYLAXIS: pantoprazole  Injectable 40 milliGRAM(s) IV Push daily    ANTICOAGULATION:   ANTIBIOTICS:            LAB/STUDIES:  Labs:  CAPILLARY BLOOD GLUCOSE                              11.3   11.10 )-----------( 123      ( 15 Darryl 2022 02:45 )             33.6       Auto Neutrophil %: 82.8 % (22 @ 12:13)  Auto Immature Granulocyte %: 0.6 % (22 @ 12:13)    06-15    142  |  105  |  25<H>  ----------------------------<  135<H>  4.2   |  25  |  1.1      Calcium, Total Serum: 9.1 mg/dL (06-15-22 @ 02:45)      LFTs:             5.9  | 0.6  | 24       ------------------[65      ( 2022 22:40 )  3.9  | x    | 25          Lipase:27     Amylase:x         Blood Gas Venous - Lactate: 1.50 mmol/L (22 @ 22:59)  Lactate, Blood: 1.5 mmol/L (22 @ 22:40)      Coags:     12.00  ----< 1.04    ( 2022 22:40 )     23.1                Urinalysis Basic - ( 2022 11:04 )    Color: Yellow / Appearance: Clear / S.020 / pH: x  Gluc: x / Ketone: Negative  / Bili: Negative / Urobili: <2 mg/dL   Blood: x / Protein: 100 mg/dL / Nitrite: Negative   Leuk Esterase: Negative / RBC: 5 /HPF / WBC 50 /HPF   Sq Epi: x / Non Sq Epi: 5 /HPF / Bacteria: Negative

## 2022-06-15 NOTE — PROGRESS NOTE ADULT - SUBJECTIVE AND OBJECTIVE BOX
KIN HERNANDEZ  [id number]  89y Female    Indication for ICU admission:  Admit Date:06-14-22  ICU Date:  OR Date:    No Known Allergies    PAST MEDICAL & SURGICAL HISTORY:  No pertinent past medical history    No significant past surgical history      Home Medications:  atorvastatin 10 mg tablet:  (14 Jun 2022 01:26)  brimonidine 0.15 % eye drops:  (14 Jun 2022 01:26)  furosemide 20 mg tablet:  (14 Jun 2022 01:26)  latanoprost 0.005 % eye drops:  (14 Jun 2022 01:26)  METOPROLOL SUCC ER 25 MG TAB: TAKE 1 TABLET BY MOUTH EVERY DAY (14 Jun 2022 01:26)  PREDNISONE 5 MG TABLET: TAKE 2 TABLETS BY MOUTH ONCE A DAY (14 Jun 2022 01:26)  valsartan 80 mg tablet:  (14 Jun 2022 01:26)        24HRS EVENT:  6/14  Night   -Agitated, given Zyprexa 2.5x2   -AM labs(refused)   - tachy to 130s, EKG - sinus tachycardia, metoprolol 5mg x1    Day:  -rept UA neg  - KUB: non-obstructive  -1100 CBC - 12.4/36.3  -started HSQ  -d/c'd Dilaudid, became combative and agitated, and desatting to 85 while sleeping  -start gabapentin, tylenol ATC  -diet started  -othro ->wbat, non-op management  CT cysto:  vaginal fistula or reflux of contrast to the vagina  - f/u urology c/s      DVT Prophylaxis: HSQ    GI Prophylaxis:pantoprazole  Injectable 40 milliGRAM(s) IV Push daily      ***Tubes/Lines/Drains  ***  Peripheral IV  Urinary Catheter		Indication: Strict I&O          [X] A ten-point review of systems was otherwise negative except as noted above.  [  ] Due to altered mental status/intubation, subjective information was not attained from the patient. History was obtained, to the extent possible, from review of the chart and collateral sources of information.

## 2022-06-15 NOTE — PROGRESS NOTE ADULT - ASSESSMENT
Assessment & Plan  89F s/p witnessed mechanical fall at home, ?HT, -LOC, -AC, found to have R superior/inferior pubic rami fx, R sacral ala compression fx, and pelvic hemorrhage appearing extraperitoneal and measuring 11.5x8.5cm.  On CT, there is a small linear hyperdensity posterior to the pubic symphysis which may reflect a component of active bleeding, for which SICU was consulted for hemodynamic monitoring and Q1H vitals.    NEURO:  #Acute pain    - Tylenol, lidocaine, gabapentin, tylenol  -d/c'd Dilaudid, became combative and agitated, and desatting to 85 while sleeping    RESP:   #Oxygenation    -on NC  No chronic issues      CARDS:   Hx of CAD, HTN, HLD, cardiac arrhythmia  Rx-furosemide    Tablet 20 daily  metoprolol succinate ER 25 daily  valsartan 80 daily    GI/NUTR:   Deit: Dash  #GI Prophylaxis    -not indicated  #Bowel regimen    -senna/miralax  - KUB: non-obstructive    /RENAL:     Monitor UO- Larson   IVF: LR @ 100, IVL once tolerating diet    Labs:          BUN/Cr- 26/1.0  --> 25/1.1          Electrolytes-Na 142 // K 4.2 // Phos 3.1 //  Mg 1.9 -hypomagnesemia repleted   #Pelvic hemorrhage measuring 11.5x8.5cm   -Per IR, no acute intervention  CT cysto:  vaginal fistula or reflux of contrast to the vagina  -f/u Urology       HEME/ONC:   # DVT prophylaxis-HSQ started, SCDs    Labs: Hb/Hct:  12.3/36.3  -->,  12.4/36.3  -->,  11.3/33.6  -->           Plts:  143  -->,  129  -->,  123  -->              PTT/INR:  23.1/1.04  (06-13 @ 22:40) --->       ID:  #leukocytosis   WBC- 15.17  --->>,  15.91  --->>,  15.37  --->> 11.1  afebrile     ENDO:    Glucose, Serum: 131 (06-13 @ 22:40)  No chronic issues    MSK:  #R superior/inferior pubic rami fx, R sacral ala compression fx   -Orthopedic surgery: No acute intervention    LINES/DRAINS:  PIV, Larson    ADVANCED DIRECTIVES:  Full Code    HCP/Emergency Contact-    INDICATION FOR SICU/SDU: PUBIC RAMUS FRACTURE, pelvic hemorrhage, Q1H vitals    Dispo: d/g

## 2022-06-16 LAB
ANION GAP SERPL CALC-SCNC: 10 MMOL/L — SIGNIFICANT CHANGE UP (ref 7–14)
BLD GP AB SCN SERPL QL: SIGNIFICANT CHANGE UP
BUN SERPL-MCNC: 25 MG/DL — HIGH (ref 10–20)
CALCIUM SERPL-MCNC: 8.6 MG/DL — SIGNIFICANT CHANGE UP (ref 8.5–10.1)
CHLORIDE SERPL-SCNC: 108 MMOL/L — SIGNIFICANT CHANGE UP (ref 98–110)
CO2 SERPL-SCNC: 23 MMOL/L — SIGNIFICANT CHANGE UP (ref 17–32)
CREAT SERPL-MCNC: 1 MG/DL — SIGNIFICANT CHANGE UP (ref 0.7–1.5)
EGFR: 53 ML/MIN/1.73M2 — LOW
GLUCOSE SERPL-MCNC: 161 MG/DL — HIGH (ref 70–99)
HCT VFR BLD CALC: 28.5 % — LOW (ref 37–47)
HGB BLD-MCNC: 9.5 G/DL — LOW (ref 12–16)
MAGNESIUM SERPL-MCNC: 2.1 MG/DL — SIGNIFICANT CHANGE UP (ref 1.8–2.4)
MCHC RBC-ENTMCNC: 30.5 PG — SIGNIFICANT CHANGE UP (ref 27–31)
MCHC RBC-ENTMCNC: 33.3 G/DL — SIGNIFICANT CHANGE UP (ref 32–37)
MCV RBC AUTO: 91.6 FL — SIGNIFICANT CHANGE UP (ref 81–99)
NRBC # BLD: 0 /100 WBCS — SIGNIFICANT CHANGE UP (ref 0–0)
PLATELET # BLD AUTO: 101 K/UL — LOW (ref 130–400)
POTASSIUM SERPL-MCNC: 3.9 MMOL/L — SIGNIFICANT CHANGE UP (ref 3.5–5)
POTASSIUM SERPL-SCNC: 3.9 MMOL/L — SIGNIFICANT CHANGE UP (ref 3.5–5)
RBC # BLD: 3.11 M/UL — LOW (ref 4.2–5.4)
RBC # FLD: 13.4 % — SIGNIFICANT CHANGE UP (ref 11.5–14.5)
SODIUM SERPL-SCNC: 141 MMOL/L — SIGNIFICANT CHANGE UP (ref 135–146)
TROPONIN T SERPL-MCNC: 0.02 NG/ML — HIGH
WBC # BLD: 8.08 K/UL — SIGNIFICANT CHANGE UP (ref 4.8–10.8)
WBC # FLD AUTO: 8.08 K/UL — SIGNIFICANT CHANGE UP (ref 4.8–10.8)

## 2022-06-16 PROCEDURE — 93010 ELECTROCARDIOGRAM REPORT: CPT | Mod: 76

## 2022-06-16 PROCEDURE — 99232 SBSQ HOSP IP/OBS MODERATE 35: CPT

## 2022-06-16 RX ORDER — METOPROLOL TARTRATE 50 MG
5 TABLET ORAL ONCE
Refills: 0 | Status: COMPLETED | OUTPATIENT
Start: 2022-06-16 | End: 2022-06-16

## 2022-06-16 RX ORDER — SODIUM CHLORIDE 9 MG/ML
250 INJECTION, SOLUTION INTRAVENOUS ONCE
Refills: 0 | Status: COMPLETED | OUTPATIENT
Start: 2022-06-16 | End: 2022-06-16

## 2022-06-16 RX ORDER — SODIUM CHLORIDE 9 MG/ML
1000 INJECTION, SOLUTION INTRAVENOUS
Refills: 0 | Status: DISCONTINUED | OUTPATIENT
Start: 2022-06-16 | End: 2022-06-17

## 2022-06-16 RX ORDER — SODIUM CHLORIDE 9 MG/ML
500 INJECTION, SOLUTION INTRAVENOUS ONCE
Refills: 0 | Status: COMPLETED | OUTPATIENT
Start: 2022-06-16 | End: 2022-06-16

## 2022-06-16 RX ORDER — QUETIAPINE FUMARATE 200 MG/1
12.5 TABLET, FILM COATED ORAL EVERY 12 HOURS
Refills: 0 | Status: DISCONTINUED | OUTPATIENT
Start: 2022-06-16 | End: 2022-06-16

## 2022-06-16 RX ORDER — OLANZAPINE 15 MG/1
2.5 TABLET, FILM COATED ORAL ONCE
Refills: 0 | Status: COMPLETED | OUTPATIENT
Start: 2022-06-16 | End: 2022-06-16

## 2022-06-16 RX ORDER — METOPROLOL TARTRATE 50 MG
5 TABLET ORAL EVERY 6 HOURS
Refills: 0 | Status: DISCONTINUED | OUTPATIENT
Start: 2022-06-16 | End: 2022-06-17

## 2022-06-16 RX ORDER — NOREPINEPHRINE BITARTRATE/D5W 8 MG/250ML
0.05 PLASTIC BAG, INJECTION (ML) INTRAVENOUS
Qty: 8 | Refills: 0 | Status: DISCONTINUED | OUTPATIENT
Start: 2022-06-16 | End: 2022-06-16

## 2022-06-16 RX ORDER — HYDROCORTISONE 20 MG
50 TABLET ORAL EVERY 6 HOURS
Refills: 0 | Status: DISCONTINUED | OUTPATIENT
Start: 2022-06-16 | End: 2022-06-17

## 2022-06-16 RX ADMIN — SODIUM CHLORIDE 500 MILLILITER(S): 9 INJECTION, SOLUTION INTRAVENOUS at 10:51

## 2022-06-16 RX ADMIN — Medication 5 MILLIGRAM(S): at 23:11

## 2022-06-16 RX ADMIN — Medication 6.65 MICROGRAM(S)/KG/MIN: at 18:25

## 2022-06-16 RX ADMIN — SODIUM CHLORIDE 100 MILLILITER(S): 9 INJECTION, SOLUTION INTRAVENOUS at 18:26

## 2022-06-16 RX ADMIN — GABAPENTIN 100 MILLIGRAM(S): 400 CAPSULE ORAL at 06:57

## 2022-06-16 RX ADMIN — LATANOPROST 1 DROP(S): 0.05 SOLUTION/ DROPS OPHTHALMIC; TOPICAL at 21:11

## 2022-06-16 RX ADMIN — POLYETHYLENE GLYCOL 3350 17 GRAM(S): 17 POWDER, FOR SOLUTION ORAL at 06:58

## 2022-06-16 RX ADMIN — Medication 10 MILLIGRAM(S): at 10:26

## 2022-06-16 RX ADMIN — LIDOCAINE 1 PATCH: 4 CREAM TOPICAL at 11:02

## 2022-06-16 RX ADMIN — Medication 50 MILLIGRAM(S): at 18:47

## 2022-06-16 RX ADMIN — Medication 650 MILLIGRAM(S): at 18:45

## 2022-06-16 RX ADMIN — OLANZAPINE 2.5 MILLIGRAM(S): 15 TABLET, FILM COATED ORAL at 02:13

## 2022-06-16 RX ADMIN — SODIUM CHLORIDE 500 MILLILITER(S): 9 INJECTION, SOLUTION INTRAVENOUS at 08:28

## 2022-06-16 RX ADMIN — SODIUM CHLORIDE 3000 MILLILITER(S): 9 INJECTION, SOLUTION INTRAVENOUS at 09:17

## 2022-06-16 RX ADMIN — Medication 650 MILLIGRAM(S): at 06:58

## 2022-06-16 RX ADMIN — Medication 25 MILLIGRAM(S): at 06:57

## 2022-06-16 RX ADMIN — Medication 50 MILLIGRAM(S): at 23:10

## 2022-06-16 RX ADMIN — HEPARIN SODIUM 5000 UNIT(S): 5000 INJECTION INTRAVENOUS; SUBCUTANEOUS at 13:30

## 2022-06-16 RX ADMIN — Medication 650 MILLIGRAM(S): at 11:02

## 2022-06-16 RX ADMIN — Medication 5 MILLIGRAM(S): at 04:15

## 2022-06-16 RX ADMIN — Medication 20 MILLIGRAM(S): at 06:57

## 2022-06-16 RX ADMIN — QUETIAPINE FUMARATE 25 MILLIGRAM(S): 200 TABLET, FILM COATED ORAL at 06:57

## 2022-06-16 RX ADMIN — CHLORHEXIDINE GLUCONATE 1 APPLICATION(S): 213 SOLUTION TOPICAL at 07:04

## 2022-06-16 RX ADMIN — Medication 650 MILLIGRAM(S): at 11:34

## 2022-06-16 RX ADMIN — GABAPENTIN 100 MILLIGRAM(S): 400 CAPSULE ORAL at 21:10

## 2022-06-16 RX ADMIN — Medication 650 MILLIGRAM(S): at 23:11

## 2022-06-16 RX ADMIN — Medication 5 MILLIGRAM(S): at 07:36

## 2022-06-16 RX ADMIN — HEPARIN SODIUM 5000 UNIT(S): 5000 INJECTION INTRAVENOUS; SUBCUTANEOUS at 21:25

## 2022-06-16 RX ADMIN — Medication 50 MILLIGRAM(S): at 13:31

## 2022-06-16 RX ADMIN — SENNA PLUS 2 TABLET(S): 8.6 TABLET ORAL at 21:10

## 2022-06-16 RX ADMIN — POLYETHYLENE GLYCOL 3350 17 GRAM(S): 17 POWDER, FOR SOLUTION ORAL at 18:47

## 2022-06-16 RX ADMIN — Medication 6.65 MICROGRAM(S)/KG/MIN: at 13:49

## 2022-06-16 RX ADMIN — BRIMONIDINE TARTRATE 1 DROP(S): 2 SOLUTION/ DROPS OPHTHALMIC at 12:24

## 2022-06-16 RX ADMIN — HEPARIN SODIUM 5000 UNIT(S): 5000 INJECTION INTRAVENOUS; SUBCUTANEOUS at 06:57

## 2022-06-16 NOTE — PROGRESS NOTE ADULT - SUBJECTIVE AND OBJECTIVE BOX
TRAUMA SURGERY PROGRESS NOTE    Patient: KIN HERNANDEZ , 89y (29)Female   MRN: [id number]  Location: [address]  Visit: 22 Inpatient  Date: 22 @ 09:17    Hospital Day #: 4    Procedure/Dx/Injuries:  R superior/inferior pubic rami fx, R sacral fracture    Events of past 24 hours:  6/15  ov  Pt pulled hollingsworth, spoke to urology will give pt TOV, will need a cysto at some point  zyprexa 2.5mg x1    Day  add ensure   Start Seroquel 25 BID  PT/physiatry consult  d/c protonix  hollingsworth x 2 weeks  11 AM CBC  restarted home prednisone   hgb 10.4 --> f/u 1600 cbc   PAST MEDICAL & SURGICAL HISTORY:  No pertinent past medical history    No significant past surgical history    Vitals:   T(F): 97.8 (22 @ 08:00), Max: 99.5 (06-15-22 @ 20:00)  HR: 90 (22 @ 08:31)  BP: 88/48 (22 @ 08:31)  RR: --  SpO2: 96% (22 @ 08:31)    Diet, DASH/TLC:   Sodium & Cholesterol Restricted  Supplement Feeding Modality:  Oral  Ensure Enlive Cans or Servings Per Day:  1       Frequency:  Three Times a day    Fluids:     I & O's:    06-15-22 @ 07:01  -  22 @ 07:00  --------------------------------------------------------  IN:    IV PiggyBack: 100 mL  Total IN: 100 mL    OUT:    Indwelling Catheter - Urethral (mL): 1075 mL  Total OUT: 1075 mL    Total NET: -975 mL    PHYSICAL EXAM:  General: NAD, AAOx3, calm and cooperative  HEENT: NCAT, ROSA, EOMI, Trachea ML, Neck supple  Cardiac: RRR S1, S2, no Murmurs, rubs or gallops  Respiratory: CTAB, normal respiratory effort, breath sounds equal BL, no wheeze, rhonchi or crackles  Abdomen: Soft, non-distended, non-tender  Extremities: LIPSCOMB, warm, well-perfused, sensation intact b/l LE    MEDICATIONS  (STANDING):  acetaminophen     Tablet .. 650 milliGRAM(s) Oral every 6 hours  brimonidine 0.2% Ophthalmic Solution 1 Drop(s) Both EYES daily  chlorhexidine 4% Liquid 1 Application(s) Topical <User Schedule>  furosemide    Tablet 20 milliGRAM(s) Oral daily  gabapentin 100 milliGRAM(s) Oral every 8 hours  heparin   Injectable 5000 Unit(s) SubCutaneous every 8 hours  latanoprost 0.005% Ophthalmic Solution 1 Drop(s) Both EYES at bedtime  lidocaine   4% Patch 1 Patch Transdermal daily  metoprolol succinate ER 25 milliGRAM(s) Oral daily  polyethylene glycol 3350 17 Gram(s) Oral every 12 hours  predniSONE   Tablet 5 milliGRAM(s) Oral daily  QUEtiapine 25 milliGRAM(s) Oral every 12 hours  senna 2 Tablet(s) Oral at bedtime  valsartan 80 milliGRAM(s) Oral daily    MEDICATIONS  (PRN):    DVT PROPHYLAXIS: SCDs, heparin   Injectable 5000 Unit(s) SubCutaneous every 8 hours                        10.5   8.96  )-----------( 106      ( 15 Darryl 2022 12:54 )             31.3         -15    142  |  105  |  25<H>  ----------------------------<  135<H>  4.2   |  25  |  1.1     Blood Gas Venous - Lactate: 1.50 mmol/L (22 @ 22:59)  Lactate, Blood: 1.5 mmol/L (22 @ 22:40)    Urinalysis Basic - ( 2022 11:04 )    Color: Yellow / Appearance: Clear / S.020 / pH: x  Gluc: x / Ketone: Negative  / Bili: Negative / Urobili: <2 mg/dL   Blood: x / Protein: 100 mg/dL / Nitrite: Negative   Leuk Esterase: Negative / RBC: 5 /HPF / WBC 50 /HPF   Sq Epi: x / Non Sq Epi: 5 /HPF / Bacteria: Negative

## 2022-06-16 NOTE — PROGRESS NOTE ADULT - SUBJECTIVE AND OBJECTIVE BOX
KIN HERNANDEZ  [id number]  89y Female    Indication for ICU admission:  Admit Date:06-14-22  ICU Date:  OR Date:    No Known Allergies    PAST MEDICAL & SURGICAL HISTORY:  No pertinent past medical history    No significant past surgical history      Home Medications:  atorvastatin 10 mg tablet:  (14 Jun 2022 01:26)  brimonidine 0.15 % eye drops:  (14 Jun 2022 01:26)  furosemide 20 mg tablet:  (14 Jun 2022 01:26)  latanoprost 0.005 % eye drops:  (14 Jun 2022 01:26)  METOPROLOL SUCC ER 25 MG TAB: TAKE 1 TABLET BY MOUTH EVERY DAY (14 Jun 2022 01:26)  PREDNISONE 5 MG TABLET: TAKE 2 TABLETS BY MOUTH ONCE A DAY (14 Jun 2022 01:26)  valsartan 80 mg tablet:  (14 Jun 2022 01:26)        24HRS EVENT:    6/15  ov  Pt pulled hollingsworth, spoke to urology will give pt TOV, will need a cysto at some point  zyprexa 2.5mg x1    Day  add ensure   Start Seroquel 25 BID  PT/physiatry consult  d/c protonix  hollingsworth x 2 weeks  11 AM CBC  restarted home prednisone   hgb 10.4 --> f/u 1600 cbc       DVT Prophylaxis: HSQ    GI Prophylaxis:pantoprazole  Injectable 40 milliGRAM(s) IV Push daily      ***Tubes/Lines/Drains  ***  Peripheral IV        [X] A ten-point review of systems was otherwise negative except as noted above.  [  ] Due to altered mental status/intubation, subjective information was not attained from the patient. History was obtained, to the extent possible, from review of the chart and collateral sources of information.     KIN HERNANDEZ  [id number]  89y Female    Indication for ICU admission:  Admit Date:06-14-22  ICU Date:  OR Date:    No Known Allergies    PAST MEDICAL & SURGICAL HISTORY:  No pertinent past medical history    No significant past surgical history      Home Medications:  atorvastatin 10 mg tablet:  (14 Jun 2022 01:26)  brimonidine 0.15 % eye drops:  (14 Jun 2022 01:26)  furosemide 20 mg tablet:  (14 Jun 2022 01:26)  latanoprost 0.005 % eye drops:  (14 Jun 2022 01:26)  METOPROLOL SUCC ER 25 MG TAB: TAKE 1 TABLET BY MOUTH EVERY DAY (14 Jun 2022 01:26)  PREDNISONE 5 MG TABLET: TAKE 2 TABLETS BY MOUTH ONCE A DAY (14 Jun 2022 01:26)  valsartan 80 mg tablet:  (14 Jun 2022 01:26)        24HRS EVENT:    6/15  ov  Pt pulled hollingsworth, spoke to urology will give pt TOV, will need a cysto at some point  zyprexa 2.5mg x1    Day  add ensure   Start Seroquel 25 BID  PT/physiatry consult  d/c protonix  hollingsworth x 2 weeks  11 AM CBC  restarted home prednisone   hgb 10.4 --> f/u 1600 cbc       DVT Prophylaxis: HSQ    GI Prophylaxis:pantoprazole  Injectable 40 milliGRAM(s) IV Push daily      ***Tubes/Lines/Drains  ***  Peripheral IV        [X] A ten-point review of systems was otherwise negative except as noted above.  [  ] Due to altered mental status/intubation, subjective information was not attained from the patient. History was obtained, to the extent possible, from review of the chart and collateral sources of information.    Daily     Daily     Diet, NPO (06-16-22 @ 14:32)      CURRENT MEDS:  Neurologic Medications  acetaminophen     Tablet .. 650 milliGRAM(s) Oral every 6 hours  gabapentin 100 milliGRAM(s) Oral every 8 hours    Respiratory Medications    Cardiovascular Medications  metoprolol succinate ER 25 milliGRAM(s) Oral daily    Gastrointestinal Medications  lactated ringers. 1000 milliLiter(s) IV Continuous <Continuous>  polyethylene glycol 3350 17 Gram(s) Oral every 12 hours  senna 2 Tablet(s) Oral at bedtime    Genitourinary Medications    Hematologic/Oncologic Medications  heparin   Injectable 5000 Unit(s) SubCutaneous every 8 hours    Antimicrobial/Immunologic Medications    Endocrine/Metabolic Medications  hydrocortisone sodium succinate Injectable 50 milliGRAM(s) IV Push every 6 hours    Topical/Other Medications  brimonidine 0.2% Ophthalmic Solution 1 Drop(s) Both EYES daily  chlorhexidine 4% Liquid 1 Application(s) Topical <User Schedule>  latanoprost 0.005% Ophthalmic Solution 1 Drop(s) Both EYES at bedtime  lidocaine   4% Patch 1 Patch Transdermal daily      ICU Vital Signs Last 24 Hrs  T(C): 35.9 (16 Jun 2022 12:00), Max: 37.5 (15 Darryl 2022 20:00)  T(F): 96.6 (16 Jun 2022 12:00), Max: 99.5 (15 Darryl 2022 20:00)  HR: 77 (16 Jun 2022 18:00) (74 - 130)  BP: 156/76 (16 Jun 2022 18:00) (72/54 - 156/76)  BP(mean): 109 (16 Jun 2022 18:00) (59 - 109)  ABP: --  ABP(mean): --  RR: 18 (16 Jun 2022 12:00) (18 - 18)  SpO2: 100% (16 Jun 2022 18:00) (90% - 100%)      Adult Advanced Hemodynamics Last 24 Hrs  CVP(mm Hg): --  CVP(cm H2O): --  CO: --  CI: --  PA: --  PA(mean): --  PCWP: --  SVR: --  SVRI: --  PVR: --  PVRI: --          I&O's Summary    15 Darryl 2022 07:01  -  16 Jun 2022 07:00  --------------------------------------------------------  IN: 100 mL / OUT: 1075 mL / NET: -975 mL    16 Jun 2022 07:01  -  16 Jun 2022 19:01  --------------------------------------------------------  IN: 1029.2 mL / OUT: 0 mL / NET: 1029.2 mL      I&O's Detail    15 Darryl 2022 07:01  -  16 Jun 2022 07:00  --------------------------------------------------------  IN:    IV PiggyBack: 100 mL  Total IN: 100 mL    OUT:    Indwelling Catheter - Urethral (mL): 1075 mL  Total OUT: 1075 mL    Total NET: -975 mL      16 Jun 2022 07:01  -  16 Jun 2022 19:01  --------------------------------------------------------  IN:    Lactated Ringers Bolus: 1000 mL    Norepinephrine: 29.2 mL  Total IN: 1029.2 mL    OUT:  Total OUT: 0 mL    Total NET: 1029.2 mL          PHYSICAL EXAM:     a&ox2  somolent  lethargic  arousable  abdomen mild distention  ramses hollingsworth

## 2022-06-16 NOTE — PHYSICAL THERAPY INITIAL EVALUATION ADULT - SPECIFY REASON(S)
Pt is not appropriate for PT at this time due to lethargic and confused, RN Jane aware. PT will f/u when appropriate.

## 2022-06-16 NOTE — PROGRESS NOTE ADULT - ASSESSMENT
89F w/ PMHx of HTN, HLD, cardiac arrhythmia, and CAD seen as Trauma Alert s/p mechanical fall w/ ?HT, ?LOC, -AC, no external signs of trauma. Trauma assessment in ED: ABCs intact, GCS 15, LIPSCOMB.     Injuries identified:   - Acute displaced fracture extending from the right superior pubic ramus through the pubic body and into the right inferior pubic ramus.  - Additional acute nondisplaced right inferior pubic ramus fracture.  - Acute nondisplaced right sacral fracture.  - Hemorrhage in the pelvis which appears essentially extraperitoneal and spans at least 11.5 x 8.5 cm which may reflect a component of active bleeding.    PLAN:   - Care per SICU  - Monitor labs  - Ortho: WBAT  - Monitor vitals  - Pain control  - Monitor urine output  - DVT and GI Prophylaxis  - Constant reorientation   - PT  - Rehab    [id number]

## 2022-06-16 NOTE — PROGRESS NOTE ADULT - ATTENDING COMMENTS
89F s/p witnessed mechanical fall at home, ?HT, -LOC, -AC, found to have R superior/inferior pubic rami fx, R sacral ala compression fx, and pelvic hemorrhage appearing extraperitoneal and measuring 11.5x8.5cm.  On CT, there is a small linear hyperdensity posterior to the pubic symphysis which may reflect a component of active bleeding, for which SICU was consulted for hemodynamic monitoring and Q1H vitals.        6/16 am acute hypotension   - nicom   - ekg , troponins   - stagger medications   - check hgb ( refused overnight) stat cbc       #Acute pain    - Tylenol, lidocaine, gabapentin, tylenol    #delirium, pain and hospital   -d/c'd Dilaudid, became combative and agitated, and desatting to 85 while sleeping  - given Zyprexa 2.5x2  - start PO Seroquel if taking PO   - frequent reorienting      #respiratory insufficiency     -on NC  No chronic issues      CARDS:   Hx of CAD, HTN, HLD, cardiac arrhythmia  Rx-furosemide    Tablet 20 daily  metoprolol succinate ER 25 daily  valsartan 80 daily     Diet: Dash  #GI Prophylaxis    -not indicated  #Bowel regimen    -senna/miralax  - KUB: non-obstructive         Monitor UO- [place]   IVF: LR @ 100, IVL once tolerating diet    Labs:          BUN/Cr- 26/1.0  --> 25/1.1          Electrolytes-Na 142 // K 4.2 // Phos 3.1 //  Mg 1.9 -hypomagnesemia repleted   #Pelvic hemorrhage measuring 11.5x8.5cm   -Per IR, no acute intervention  CT cysto:  vaginal fistula or reflux of contrast to the vagina  -f/u Urology            # DVT prophylaxis-HSQ started, SCDs    Labs: Hb/Hct:  12.3/36.3  -->,  12.4/36.3  -->,  11.3/33.6  --> 10.5 > refused labs 6/15 pm stat labs this am               #leukocytosis   WBC- 15.17  --->>,  15.91  --->>,  15.37  --->> 11.1  afebrile           #R superior/inferior pubic rami fx, R sacral ala compression fx   -Orthopedic surgery: No acute intervention  - hgb stable   - continue hsq   - pt 89F s/p witnessed mechanical fall at home, ?HT, -LOC, -AC, found to have R superior/inferior pubic rami fx, R sacral ala compression fx, and pelvic hemorrhage appearing extraperitoneal and measuring 11.5x8.5cm.  On CT, there is a small linear hyperdensity posterior to the pubic symphysis which may reflect a component of active bleeding, for which SICU was consulted for hemodynamic monitoring and Q1H vitals.      6/15 pm pulled out hollingsworth   - urology - TOV , will replace if fails   - needs cysto this admission     6/16 am acute hypotension   - nicom   - ekg , troponins   - stagger medications   - check hgb ( refused overnight) stat cbc       #Acute pain    - Tylenol, lidocaine, gabapentin, tylenol    #delirium, pain and hospital   -d/c'd Dilaudid, became combative and agitated, and desatting to 85 while sleeping  - given Zyprexa 2.5x2  - start PO Seroquel if taking PO   - frequent reorienting      #respiratory insufficiency     -on NC  No chronic issues      CARDS:   Hx of CAD, HTN, HLD, cardiac arrhythmia  Rx-furosemide    Tablet 20 daily  metoprolol succinate ER 25 daily  valsartan 80 daily     Diet: Dash  #GI Prophylaxis    -not indicated  #Bowel regimen    -senna/miralax  - KUB: non-obstructive         Monitor UO- Hollingsworth   IVF: LR @ 100, IVL once tolerating diet    Labs:          BUN/Cr- 26/1.0  --> 25/1.1          Electrolytes-Na 142 // K 4.2 // Phos 3.1 //  Mg 1.9 -hypomagnesemia repleted   #Pelvic hemorrhage measuring 11.5x8.5cm   -Per IR, no acute intervention  CT cysto:  vaginal fistula or reflux of contrast to the vagina  -f/u Urology            # DVT prophylaxis-HSQ started, SCDs    Labs: Hb/Hct:  12.3/36.3  -->,  12.4/36.3  -->,  11.3/33.6  --> 10.5 > refused labs 6/15 pm stat labs this am               #leukocytosis   WBC- 15.17  --->>,  15.91  --->>,  15.37  --->> 11.1  afebrile           #R superior/inferior pubic rami fx, R sacral ala compression fx   -Orthopedic surgery: No acute intervention  - hgb stable   - continue hsq   - pt

## 2022-06-16 NOTE — PROGRESS NOTE ADULT - ASSESSMENT
Assessment & Plan  89F s/p witnessed mechanical fall at home, ?HT, -LOC, -AC, found to have R superior/inferior pubic rami fx, R sacral ala compression fx, and pelvic hemorrhage appearing extraperitoneal and measuring 11.5x8.5cm.  On CT, there is a small linear hyperdensity posterior to the pubic symphysis which may reflect a component of active bleeding, for which SICU was consulted for hemodynamic monitoring and Q1H vitals.    NEURO:  #Acute pain    - Tylenol, lidocaine, gabapentin, tylenol  -d/c'd Dilaudid, became combative and agitated, and desatting to 85 while sleeping    RESP:   #Oxygenation    -on NC  No chronic issues      CARDS:   Hx of CAD, HTN, HLD, cardiac arrhythmia  Rx-furosemide    Tablet 20 daily  metoprolol succinate ER 25 daily  valsartan 80 daily    GI/NUTR:   Deit: Dash  #GI Prophylaxis    -not indicated  #Bowel regimen    -senna/miralax  - KUB: non-obstructive    /RENAL:     pulled hollingsworth OV, urology notified, will need cysto at some point  IVL    Labs:          BUN/Cr- 26/1.0  --> 25/1.1          Electrolytes-Na 142 // K 4.2 // Phos 3.1 //  Mg 1.9 -hypomagnesemia repleted   #Pelvic hemorrhage measuring 11.5x8.5cm   -Per IR, no acute intervention  CT cysto:  vaginal fistula or reflux of contrast to the vagina  -f/u Urology       HEME/ONC:   # DVT prophylaxis-HSQ started, SCDs    Labs: Hb/Hct:  12.3/36.3  -->,  12.4/36.3  -->,  11.3/33.6  -->           Plts:  143  -->,  129  -->,  123  -->              PTT/INR:  23.1/1.04  (06-13 @ 22:40) --->       ID:  #leukocytosis   WBC- 15.17  --->>,  15.91  --->>,  15.37  --->> 11.1  afebrile     ENDO:    Glucose, Serum: 131 (06-13 @ 22:40)  No chronic issues    MSK:  #R superior/inferior pubic rami fx, R sacral ala compression fx   -Orthopedic surgery: No acute intervention    LINES/DRAINS:  PIV, Hollingsworth    ADVANCED DIRECTIVES:  Full Code    HCP/Emergency Contact-    INDICATION FOR SICU/SDU: PUBIC RAMUS FRACTURE, pelvic hemorrhage, Q1H vitals    Dispo: d/g   89F s/p witnessed mechanical fall at home, ?HT, -LOC, -AC, found to have R superior/inferior pubic rami fx, R sacral ala compression fx, and pelvic hemorrhage appearing extraperitoneal and measuring 11.5x8.5cm.  On CT, there is a small linear hyperdensity posterior to the pubic symphysis which may reflect a component of active bleeding, for which SICU was consulted for hemodynamic monitoring and Q1H vitals.    NEURO:  #Acute pain    - Tylenol, lidocaine, gabapentin, tylenol  -d/c'd Dilaudid, became combative and agitated, and desatting to 85 while sleeping    RESP:   #Oxygenation    -on NC  No chronic issues      CARDS:   Hx of CAD, HTN, HLD, cardiac arrhythmia  Rx-furosemide    Tablet 20 daily  metoprolol succinate ER 25 daily  valsartan 80 daily    GI/NUTR:   Deit: Dash  #GI Prophylaxis    -not indicated  #Bowel regimen    -senna/miralax  - KUB: non-obstructive    /RENAL:     IVL  Electrolytes-Na 142 // K 4.2 // Mg 1.9 //  Phos 3.1 (06-15 @ 02:45)  #Pelvic hemorrhage measuring 11.5x8.5cm   -Per IR, no acute intervention  CT cysto:  vaginal fistula or reflux of contrast to the vagina  #?vaginal fistula    -patient removed hollingsworth on 6/15 evening, Urology made aware, no replacement, give TOV    -cystogram post op    -Void trial for 6/16 AM       HEME/ONC:   # DVT prophylaxis-HSQ started, SCDs    Labs: Hb/Hct:  11.3/33.6  -->,  10.5/31.3  -->                      Plts:  123  -->,  106  -->                 PTT/INR:         ID:  #leukocytosis   WBC- 15.37  --->>,  11.10  --->>,  8.96  --->>  Temp trend- 24hrs T(F): 97.1 (06-16 @ 07:00), Max: 99.5 (06-15 @ 20:00)    ENDO:    Glucose, Serum: 131 (06-13 @ 22:40)  No chronic issues    MSK:  #R superior/inferior pubic rami fx, R sacral ala compression fx   -Orthopedic surgery: No acute intervention    LINES/DRAINS:  PIV, Hollingsworth    ADVANCED DIRECTIVES:  Full Code    INDICATION FOR SICU/SDU: PUBIC RAMUS FRACTURE, pelvic hemorrhage, Q1H vitals    Dispo: d/g

## 2022-06-17 LAB
ANION GAP SERPL CALC-SCNC: 15 MMOL/L — HIGH (ref 7–14)
BASOPHILS # BLD AUTO: 0.02 K/UL — SIGNIFICANT CHANGE UP (ref 0–0.2)
BASOPHILS NFR BLD AUTO: 0.2 % — SIGNIFICANT CHANGE UP (ref 0–1)
BUN SERPL-MCNC: 24 MG/DL — HIGH (ref 10–20)
CALCIUM SERPL-MCNC: 8.9 MG/DL — SIGNIFICANT CHANGE UP (ref 8.5–10.1)
CHLORIDE SERPL-SCNC: 104 MMOL/L — SIGNIFICANT CHANGE UP (ref 98–110)
CO2 SERPL-SCNC: 22 MMOL/L — SIGNIFICANT CHANGE UP (ref 17–32)
CREAT SERPL-MCNC: 0.9 MG/DL — SIGNIFICANT CHANGE UP (ref 0.7–1.5)
EGFR: 60 ML/MIN/1.73M2 — SIGNIFICANT CHANGE UP
EOSINOPHIL # BLD AUTO: 0.01 K/UL — SIGNIFICANT CHANGE UP (ref 0–0.7)
EOSINOPHIL NFR BLD AUTO: 0.1 % — SIGNIFICANT CHANGE UP (ref 0–8)
GLUCOSE SERPL-MCNC: 138 MG/DL — HIGH (ref 70–99)
HCT VFR BLD CALC: 31.8 % — LOW (ref 37–47)
HGB BLD-MCNC: 10.7 G/DL — LOW (ref 12–16)
IMM GRANULOCYTES NFR BLD AUTO: 0.9 % — HIGH (ref 0.1–0.3)
LYMPHOCYTES # BLD AUTO: 0.6 K/UL — LOW (ref 1.2–3.4)
LYMPHOCYTES # BLD AUTO: 6.5 % — LOW (ref 20.5–51.1)
MAGNESIUM SERPL-MCNC: 2.1 MG/DL — SIGNIFICANT CHANGE UP (ref 1.8–2.4)
MCHC RBC-ENTMCNC: 31.3 PG — HIGH (ref 27–31)
MCHC RBC-ENTMCNC: 33.6 G/DL — SIGNIFICANT CHANGE UP (ref 32–37)
MCV RBC AUTO: 93 FL — SIGNIFICANT CHANGE UP (ref 81–99)
MONOCYTES # BLD AUTO: 0.41 K/UL — SIGNIFICANT CHANGE UP (ref 0.1–0.6)
MONOCYTES NFR BLD AUTO: 4.5 % — SIGNIFICANT CHANGE UP (ref 1.7–9.3)
NEUTROPHILS # BLD AUTO: 8.07 K/UL — HIGH (ref 1.4–6.5)
NEUTROPHILS NFR BLD AUTO: 87.8 % — HIGH (ref 42.2–75.2)
NRBC # BLD: 0 /100 WBCS — SIGNIFICANT CHANGE UP (ref 0–0)
PHOSPHATE SERPL-MCNC: 2.9 MG/DL — SIGNIFICANT CHANGE UP (ref 2.1–4.9)
PLATELET # BLD AUTO: 129 K/UL — LOW (ref 130–400)
POTASSIUM SERPL-MCNC: 3.6 MMOL/L — SIGNIFICANT CHANGE UP (ref 3.5–5)
POTASSIUM SERPL-SCNC: 3.6 MMOL/L — SIGNIFICANT CHANGE UP (ref 3.5–5)
RBC # BLD: 3.42 M/UL — LOW (ref 4.2–5.4)
RBC # FLD: 13.4 % — SIGNIFICANT CHANGE UP (ref 11.5–14.5)
SODIUM SERPL-SCNC: 141 MMOL/L — SIGNIFICANT CHANGE UP (ref 135–146)
TROPONIN T SERPL-MCNC: <0.01 NG/ML — SIGNIFICANT CHANGE UP
TROPONIN T SERPL-MCNC: <0.01 NG/ML — SIGNIFICANT CHANGE UP
WBC # BLD: 9.19 K/UL — SIGNIFICANT CHANGE UP (ref 4.8–10.8)
WBC # FLD AUTO: 9.19 K/UL — SIGNIFICANT CHANGE UP (ref 4.8–10.8)

## 2022-06-17 PROCEDURE — 99232 SBSQ HOSP IP/OBS MODERATE 35: CPT

## 2022-06-17 RX ORDER — GABAPENTIN 400 MG/1
100 CAPSULE ORAL THREE TIMES A DAY
Refills: 0 | Status: DISCONTINUED | OUTPATIENT
Start: 2022-06-17 | End: 2022-06-21

## 2022-06-17 RX ORDER — POTASSIUM CHLORIDE 20 MEQ
20 PACKET (EA) ORAL ONCE
Refills: 0 | Status: COMPLETED | OUTPATIENT
Start: 2022-06-17 | End: 2022-06-17

## 2022-06-17 RX ORDER — ACETAMINOPHEN 500 MG
1000 TABLET ORAL ONCE
Refills: 0 | Status: COMPLETED | OUTPATIENT
Start: 2022-06-17 | End: 2022-06-17

## 2022-06-17 RX ORDER — METOPROLOL TARTRATE 50 MG
25 TABLET ORAL
Refills: 0 | Status: DISCONTINUED | OUTPATIENT
Start: 2022-06-17 | End: 2022-06-17

## 2022-06-17 RX ORDER — POTASSIUM PHOSPHATE, MONOBASIC POTASSIUM PHOSPHATE, DIBASIC 236; 224 MG/ML; MG/ML
15 INJECTION, SOLUTION INTRAVENOUS ONCE
Refills: 0 | Status: COMPLETED | OUTPATIENT
Start: 2022-06-17 | End: 2022-06-17

## 2022-06-17 RX ORDER — METOPROLOL TARTRATE 50 MG
12.5 TABLET ORAL EVERY 12 HOURS
Refills: 0 | Status: DISCONTINUED | OUTPATIENT
Start: 2022-06-17 | End: 2022-06-21

## 2022-06-17 RX ADMIN — Medication 5 MILLIGRAM(S): at 05:24

## 2022-06-17 RX ADMIN — Medication 12.5 MILLIGRAM(S): at 18:30

## 2022-06-17 RX ADMIN — GABAPENTIN 100 MILLIGRAM(S): 400 CAPSULE ORAL at 13:04

## 2022-06-17 RX ADMIN — LATANOPROST 1 DROP(S): 0.05 SOLUTION/ DROPS OPHTHALMIC; TOPICAL at 22:40

## 2022-06-17 RX ADMIN — GABAPENTIN 100 MILLIGRAM(S): 400 CAPSULE ORAL at 05:24

## 2022-06-17 RX ADMIN — BRIMONIDINE TARTRATE 1 DROP(S): 2 SOLUTION/ DROPS OPHTHALMIC at 11:47

## 2022-06-17 RX ADMIN — HEPARIN SODIUM 5000 UNIT(S): 5000 INJECTION INTRAVENOUS; SUBCUTANEOUS at 22:39

## 2022-06-17 RX ADMIN — Medication 650 MILLIGRAM(S): at 11:45

## 2022-06-17 RX ADMIN — HEPARIN SODIUM 5000 UNIT(S): 5000 INJECTION INTRAVENOUS; SUBCUTANEOUS at 05:27

## 2022-06-17 RX ADMIN — SENNA PLUS 2 TABLET(S): 8.6 TABLET ORAL at 22:39

## 2022-06-17 RX ADMIN — Medication 5 MILLIGRAM(S): at 11:45

## 2022-06-17 RX ADMIN — Medication 650 MILLIGRAM(S): at 05:24

## 2022-06-17 RX ADMIN — LIDOCAINE 1 PATCH: 4 CREAM TOPICAL at 11:46

## 2022-06-17 RX ADMIN — CHLORHEXIDINE GLUCONATE 1 APPLICATION(S): 213 SOLUTION TOPICAL at 05:26

## 2022-06-17 RX ADMIN — POTASSIUM PHOSPHATE, MONOBASIC POTASSIUM PHOSPHATE, DIBASIC 62.5 MILLIMOLE(S): 236; 224 INJECTION, SOLUTION INTRAVENOUS at 10:47

## 2022-06-17 RX ADMIN — HEPARIN SODIUM 5000 UNIT(S): 5000 INJECTION INTRAVENOUS; SUBCUTANEOUS at 13:04

## 2022-06-17 RX ADMIN — Medication 400 MILLIGRAM(S): at 20:11

## 2022-06-17 RX ADMIN — GABAPENTIN 100 MILLIGRAM(S): 400 CAPSULE ORAL at 22:40

## 2022-06-17 RX ADMIN — Medication 50 MILLIGRAM(S): at 05:24

## 2022-06-17 RX ADMIN — Medication 5 MILLIGRAM(S): at 13:30

## 2022-06-17 RX ADMIN — Medication 50 MILLIEQUIVALENT(S): at 08:11

## 2022-06-17 RX ADMIN — SODIUM CHLORIDE 75 MILLILITER(S): 9 INJECTION, SOLUTION INTRAVENOUS at 09:06

## 2022-06-17 NOTE — OCCUPATIONAL THERAPY INITIAL EVALUATION ADULT - LIVES WITH, PROFILE
pvt home, +SUSI, +stairs to bedroom, 1/2 bathroom on level as bedroom, 1 flight to shower, +bathtub/children

## 2022-06-17 NOTE — PROGRESS NOTE ADULT - ASSESSMENT
89F w/ PMHx of HTN, HLD, cardiac arrhythmia, and CAD seen as Trauma Alert s/p mechanical fall w/ ?HT, ?LOC, -AC, no external signs of trauma. Trauma assessment in ED: ABCs intact, GCS 15, LIPSCOMB.     Injuries identified:   - Acute displaced fracture extending from the right superior pubic ramus through the pubic body and into the right inferior pubic ramus.  - Additional acute nondisplaced right inferior pubic ramus fracture.  - Acute nondisplaced right sacral fracture.  - Hemorrhage in the pelvis which appears essentially extraperitoneal and spans at least 11.5 x 8.5 cm which may reflect a component of active bleeding.    PLAN:   - Continue care per SICU  - Monitor labs  - Ortho: WBAT  - Monitor vitals  - Pain control  - Monitor urine output  - DVT and GI Prophylaxis  - Constant reorientation   - PT  - Rehab    Trauma team spectra: [id number]

## 2022-06-17 NOTE — PHARMACOTHERAPY INTERVENTION NOTE - COMMENTS
Clarified with the team that hydrocortisone 50mg iv q6hr was ordered for stress dosing due to patient's hypotensive episode. - pt also takes prednisone 10mg at home. The equivalent dosing for prednisone 10mg is hydrocortisone 40mg iv

## 2022-06-17 NOTE — PROGRESS NOTE ADULT - ASSESSMENT
Assessment & Plan  89F s/p witnessed mechanical fall at home, ?HT, -LOC, -AC, found to have R superior/inferior pubic rami fx, R sacral ala compression fx, and pelvic hemorrhage appearing extraperitoneal and measuring 11.5x8.5cm.  On CT, there is a small linear hyperdensity posterior to the pubic symphysis which may reflect a component of active bleeding, for which SICU was consulted for hemodynamic monitoring and Q1H vitals.    NEURO:  #Acute pain    - Tylenol, lidocaine, gabapentin,   #agitation  - 1:1   - zyprexa     RESP:   #Oxygenation  98% on NC  - wean to RA as tolerated    CARDS:   Hx of CAD, HTN, HLD, cardiac arrhythmia  -HOLDING lasix, valsartan due to episode of HYPOtension (if restarting due so with parameters and seperating)  -Metoprolol 5mg q6 (home Succinate 25ER)    GI/NUTR:   Deit: NPO Strict  #GI Prophylaxis    -not indicated  #Bowel regimen    -senna/miralax  - KUB 6/14: non-obstructive    /RENAL:     self removed hollingsworth 6/16 AM-voided  IVF LR 100cc/hr  #Pelvic hemorrhage measuring 11.5x8.5cm   -Per IR, no acute intervention  CT cysto:  vaginal fistula or reflux of contrast to the vagina  -Urology c/s will require cysto routine    Labs:          BUN/Cr- 25/1.1  -->,  25/1.0  -->          Electrolytes-Na 141 // K 3.9 // Mg 2.1 //  Phos -- (06-16 @ 09:48)             HEME/ONC:   # DVT prophylaxis-HSQ started, SCDs    Labs: Hb/Hct:  10.5/31.3  -->,  9.5/28.5  -->                      Plts:  106  -->,  101  -->                 PTT/INR:          ID:  #leukocytosis   afebrile   WBC- 11.10  --->>,  8.96  --->>,  8.08  --->>  Temp trend- 24hrs T(F): 97.2 (06-16 @ 23:57), Max: 99.3 (06-16 @ 04:11)        ENDO:    Glucose, Serum: 131 (06-13 @ 22:40)    MSK:  #R superior/inferior pubic rami fx, R sacral ala compression fx   -Orthopedic surgery: No acute intervention    LINES/DRAINS:  PIV, Hollingsworth (removed 6/16)    ADVANCED DIRECTIVES:  Full Code    HCP/Emergency Contact-    INDICATION FOR SICU/SDU: PUBIC RAMUS FRACTURE, pelvic hemorrhage, Q1H vitals    Dispo: SICU  Discussed with Dr. Grace

## 2022-06-17 NOTE — PROGRESS NOTE ADULT - SUBJECTIVE AND OBJECTIVE BOX
KIN HERNANDEZ  [id number]  89y Female    Indication for ICU admission:  Admit Date:06-14-22  ICU Date: 6/14  OR Date:    No Known Allergies    PAST MEDICAL & SURGICAL HISTORY:  No pertinent past medical history    No significant past surgical history      Home Medications:  atorvastatin 10 mg tablet:  (14 Jun 2022 01:26)  brimonidine 0.15 % eye drops:  (14 Jun 2022 01:26)  furosemide 20 mg tablet:  (14 Jun 2022 01:26)  latanoprost 0.005 % eye drops:  (14 Jun 2022 01:26)  METOPROLOL SUCC ER 25 MG TAB: TAKE 1 TABLET BY MOUTH EVERY DAY (14 Jun 2022 01:26)  PREDNISONE 5 MG TABLET: TAKE 2 TABLETS BY MOUTH ONCE A DAY (14 Jun 2022 01:26)  valsartan 80 mg tablet:  (14 Jun 2022 01:26)        24HRS EVENT:  6/16  OV  agitated, 1:1 observation     Day  -HYPOtensive, of note given Lasix/Metoprolol at 6am together  -Trop 0.02, trend  -Prednisone PO only received 1 dose, discontinued  -Hydrocortisone 50mg q6   -Started on levophed, requiring low dose 0.02 mcg/kg/min  -Voiding, post void residual 180cc  -EKG Afib-->converted to NSR 6/16     6/15  ov  Pt pulled hollingsworth, spoke to urology will give pt TOV, will need a cysto at some point  zyprexa 2.5mg x1    Day  6/16  -HYPOtensive, of note given Lasix/Metoprolol at 6am together  -Trop 0.02, trend  -Prednisone PO only received 1 dose, discontinued  -Hydrocortisone 50mg q6   -Started on levophed, requiring low dose 0.02 mcg/kg/min  -Voiding, post void residual 180cc  -EKG Afib      DVT Prophylaxis: HSQ    GI Prophylaxis:pantoprazole  Injectable 40 milliGRAM(s) IV Push daily      ***Tubes/Lines/Drains  ***  Peripheral IV        [X] A ten-point review of systems was otherwise negative except as noted above.  [  ] Due to altered mental status/intubation, subjective information was not attained from the patient. History was obtained, to the extent possible, from review of the chart and collateral sources of information.

## 2022-06-17 NOTE — OCCUPATIONAL THERAPY INITIAL EVALUATION ADULT - TRANSFER TRAINING, PT EVAL
Patient will perform bed<>chair transfer with minimal assistance with use of appropriate assistive device by discharge.

## 2022-06-17 NOTE — CHART NOTE - NSCHARTNOTEFT_GEN_A_CORE
SICU Transfer Note:     Transfer from: SICU  Transfer to:  ( x ) Surgery    (  ) Telemetry    (  ) Medicine    (  ) Palliative    (  ) Stroke Unit          SICU COURSE:  89F w/ PMHx of HTN, HLD, cardiac arrhythmia, and CAD seen as Trauma Alert s/p mechanical fall w/ ?HT, ?LOC, -AC, no external signs of trauma. Trauma assessment in ED: ABCs intact, GCS 15, LIPSCOMB.     Injuries identified:   - Acute displaced fracture extending from the right superior pubic ramus through the pubic body and into the right inferior pubic ramus.  - Additional acute nondisplaced right inferior pubic ramus fracture.  - Acute nondisplaced right sacral fracture.  - Hemorrhage in the pelvis which appears essentially extraperitoneal and spans at least 11.5 x 8.5 cm which may reflect a component of active bleeding.    6/17  Day  decreased O2 to 2lpm  OOB-chair  SLP eval- if passes resume prednisone and other po meds  Continue IV hydrocortisone for now  Primafit, IVL when tolerates diet (75 for now)        6/16  OV  agitated, 1:1 observation   CE .02>.01>.01    Day  -HYPOtensive, of note given Lasix/Metoprolol at 6am together  -Trop 0.02, trend  -Prednisone PO only received 1 dose, discontinued  -Hydrocortisone 50mg q6   -Started on levophed, requiring low dose 0.02 mcg/kg/min  -Voiding, post void residual 180cc  -EKG Afib-->converted to NSR 6/16       6/15  ov  Pt pulled hollingsworth, spoke to urology will give pt TOV, will need a cysto at some point  zyprexa 2.5mg x1  HR 130s, EKG, metoprolol 5mg x1    Day  add ensure   Start Seroquel 25 BID  PT/physiatry consult  d/c protonix  hollingsworth x 2 weeks  11 AM CBC  restarted home prednisone   hgb 10.4 --> f/u 1600 cbc     6/14  Night   -Agitated, given Zyprexa 2.5x2   -AM labs(refused)   - tachy to 130s, EKG - sinus tachycardia, metoprolol 5mg x1    Day:  -rept UA neg  - KUB: non-obstructive  -1100 CBC - 12.4/36.3  -started HSQ  -d/c'd Dilaudid, became combative and agitated, and desatting to 85 while sleeping  -start gabapentin, tylenol ATC  -diet started  -othro ->wbat, non-op management  CT cysto:  Negative for bladder injury/No contrast extrav.  Contrast at distal urethra/vaginal vestibule, can not r/o vaginal fistula vs reflux of contrast  - f/u urology c/s        PAST MEDICAL & SURGICAL HISTORY:  No pertinent past medical history      No significant past surgical history        Allergies    No Known Allergies    Intolerances      MEDICATIONS  (STANDING):  brimonidine 0.2% Ophthalmic Solution 1 Drop(s) Both EYES daily  chlorhexidine 4% Liquid 1 Application(s) Topical <User Schedule>  gabapentin 100 milliGRAM(s) Oral every 8 hours  heparin   Injectable 5000 Unit(s) SubCutaneous every 8 hours  latanoprost 0.005% Ophthalmic Solution 1 Drop(s) Both EYES at bedtime  lidocaine   4% Patch 1 Patch Transdermal daily  metoprolol tartrate 25 milliGRAM(s) Oral two times a day  polyethylene glycol 3350 17 Gram(s) Oral every 12 hours  predniSONE   Tablet 5 milliGRAM(s) Oral daily  senna 2 Tablet(s) Oral at bedtime    MEDICATIONS  (PRN):        Vital Signs Last 24 Hrs  T(C): 36 (17 Jun 2022 08:00), Max: 36.2 (16 Jun 2022 23:57)  T(F): 96.8 (17 Jun 2022 08:00), Max: 97.2 (16 Jun 2022 23:57)  HR: 94 (17 Jun 2022 11:00) (74 - 95)  BP: 157/88 (17 Jun 2022 11:00) (89/60 - 168/100)  BP(mean): 113 (17 Jun 2022 11:00) (68 - 132)  RR: --  SpO2: 97% (17 Jun 2022 11:00) (96% - 100%)  I&O's Summary    16 Jun 2022 07:01  -  17 Jun 2022 07:00  --------------------------------------------------------  IN: 2229.2 mL / OUT: 0 mL / NET: 2229.2 mL        LABS                                            10.7                  Neurophils% (auto):   87.8   (06-17 @ 05:33):    9.19 )-----------(129          Lymphocytes% (auto):  6.5                                           31.8                   Eosinphils% (auto):   0.1      Manual%: Neutrophils x    ; Lymphocytes x    ; Eosinophils x    ; Bands%: x    ; Blasts x                                    141    |  104    |  24                  Calcium: 8.9   / iCa: x      (06-17 @ 05:33)    ----------------------------<  138       Magnesium: 2.1                              3.6     |  22     |  0.9              Phosphorous: 2.9                    For Follow-Up: SICU Transfer Note:     Transfer from: SICU  Transfer to:  ( x ) Surgery    (  ) Telemetry    (  ) Medicine    (  ) Palliative   (  ) Stroke Unit          SICU COURSE:  89F w/ PMHx of HTN, HLD, cardiac arrhythmia, and CAD seen as Trauma Alert s/p mechanical fall w/ ?HT, ?LOC, -AC, no external signs of trauma. Trauma assessment in ED: ABCs intact, GCS 15, LIPSCOMB.     Injuries identified:   - Acute displaced fracture extending from the right superior pubic ramus through the pubic body and into the right inferior pubic ramus.  - Additional acute nondisplaced right inferior pubic ramus fracture.  - Acute nondisplaced right sacral fracture.  - Hemorrhage in the pelvis which appears essentially extraperitoneal and spans at least 11.5 x 8.5 cm which may reflect a component of active bleeding.    6/17  Day  decreased O2 to 2lpm  OOB-chair  SLP eval- if passes resume prednisone and other po meds  Continue IV hydrocortisone for now  Primafit, IVL when tolerates diet (75 for now)    6/16  OV  agitated, 1:1 observation   CE .02>.01>.01    Day  -HYPOtensive, of note given Lasix/Metoprolol at 6am together  -Trop 0.02, trend  -Prednisone PO only received 1 dose, discontinued  -Hydrocortisone 50mg q6   -Started on levophed, requiring low dose 0.02 mcg/kg/min  -Voiding, post void residual 180cc  -EKG Afib-->converted to NSR 6/16     6/15  ov  Pt pulled hollingsworth, spoke to urology will give pt TOV, will need a cysto at some point  zyprexa 2.5mg x1  HR 130s, EKG, metoprolol 5mg x1    Day  add ensure   Start Seroquel 25 BID  PT/physiatry consult  d/c protonix  hollingsworth x 2 weeks  11 AM CBC  restarted home prednisone   hgb 10.4 --> f/u 1600 cbc     6/14  Night   -Agitated, given Zyprexa 2.5x2   -AM labs(refused)   - tachy to 130s, EKG - sinus tachycardia, metoprolol 5mg x1    Day:  -rept UA neg  - KUB: non-obstructive  -1100 CBC - 12.4/36.3  -started HSQ  -d/c'd Dilaudid, became combative and agitated, and desatting to 85 while sleeping  -start gabapentin, tylenol ATC  -diet started  -othro ->wbat, non-op management  CT cysto:  Negative for bladder injury/No contrast extrav.  Contrast at distal urethra/vaginal vestibule, can not r/o vaginal fistula vs reflux of contrast  - f/u urology c/s      PAST MEDICAL & SURGICAL HISTORY:  No pertinent past medical history    No significant past surgical history      Allergies    No Known Allergies    Intolerances      MEDICATIONS  (STANDING):  brimonidine 0.2% Ophthalmic Solution 1 Drop(s) Both EYES daily  chlorhexidine 4% Liquid 1 Application(s) Topical <User Schedule>  gabapentin 100 milliGRAM(s) Oral every 8 hours  heparin   Injectable 5000 Unit(s) SubCutaneous every 8 hours  latanoprost 0.005% Ophthalmic Solution 1 Drop(s) Both EYES at bedtime  lidocaine   4% Patch 1 Patch Transdermal daily  metoprolol tartrate 25 milliGRAM(s) Oral two times a day  polyethylene glycol 3350 17 Gram(s) Oral every 12 hours  predniSONE   Tablet 5 milliGRAM(s) Oral daily  senna 2 Tablet(s) Oral at bedtime    MEDICATIONS  (PRN):        Vital Signs Last 24 Hrs  T(C): 36 (17 Jun 2022 08:00), Max: 36.2 (16 Jun 2022 23:57)  T(F): 96.8 (17 Jun 2022 08:00), Max: 97.2 (16 Jun 2022 23:57)  HR: 94 (17 Jun 2022 11:00) (74 - 95)  BP: 157/88 (17 Jun 2022 11:00) (89/60 - 168/100)  BP(mean): 113 (17 Jun 2022 11:00) (68 - 132)  RR: --  SpO2: 97% (17 Jun 2022 11:00) (96% - 100%)  I&O's Summary    16 Jun 2022 07:01  -  17 Jun 2022 07:00  --------------------------------------------------------  IN: 2229.2 mL / OUT: 0 mL / NET: 2229.2 mL      LABS                                            10.7                  Neurophils% (auto):   87.8   (06-17 @ 05:33):    9.19 )-----------(129          Lymphocytes% (auto):  6.5                                           31.8                   Eosinphils% (auto):   0.1      Manual%: Neutrophils x    ; Lymphocytes x    ; Eosinophils x    ; Bands%: x    ; Blasts x                                    141    |  104    |  24                  Calcium: 8.9   / iCa: x      (06-17 @ 05:33)    ----------------------------<  138       Magnesium: 2.1                              3.6     |  22     |  0.9              Phosphorous: 2.9        Assessment & Plan  89F s/p witnessed mechanical fall at home, ?HT, -LOC, -AC, found to have R superior/inferior pubic rami fx, R sacral ala compression fx, and pelvic hemorrhage appearing extraperitoneal and measuring 11.5x8.5cm.  On CT, there is a small linear hyperdensity posterior to the pubic symphysis which may reflect a component of active bleeding, for which SICU was consulted for hemodynamic monitoring and Q1H vitals.    NEURO:  #Acute pain    - Tylenol, lidocaine, gabapentin,     RESP:   #Oxygenation    -on NC    CARDS:   Hx of CAD, HTN, HLD, cardiac arrhythmia  -HOLDING lasix, valsartan due to episode of HYPOtension (if restarting due so with parameters and seperating)  -restarted metoprolol 12.5 BID    GI/NUTR:   Deit: Pured thins  #GI Prophylaxis    -not indicated  #Bowel regimen    -senna/miralax  - KUB 6/14: non-obstructive    /RENAL:     self removed hollingsworth 6/16 AM-voided  DC'ed IVF  #Pelvic hemorrhage measuring 11.5x8.5cm   -Per IR, no acute intervention  CT cysto:  vaginal fistula or reflux of contrast to the vagina  -Urology c/s will require cysto routine, okay for hollingsworth DC       HEME/ONC:   # DVT prophylaxis-HSQ started, SCDs    ID:  #leukocytosis   afebrile     ENDO:    Glucose, Serum: 131 (06-13 @ 22:40)    MSK:  #R superior/inferior pubic rami fx, R sacral ala compression fx   -Orthopedic surgery: No acute intervention    LINES/DRAINS:  Marsha FUENTES (removed 6/16)    For Follow-Up:    Restart BP medications and Lasix when tolerated     Signed out to: Alana 12pm

## 2022-06-17 NOTE — OCCUPATIONAL THERAPY INITIAL EVALUATION ADULT - GENERAL OBSERVATIONS, REHAB EVAL
Pt received seated in b/s chair in NAD, agreeable to OT evaluation, dtr at bedside for polish translation. +tele, +BP cuff, +pulse oxi, left seated in b/s chair all lines intact, vitals stable

## 2022-06-17 NOTE — PROGRESS NOTE ADULT - ATTENDING COMMENTS
89F s/p witnessed mechanical fall at home, ?HT, -LOC, -AC, found to have R superior/inferior pubic rami fx, R sacral ala compression fx, and pelvic hemorrhage appearing extraperitoneal and measuring 11.5x8.5cm.  On CT, there is a small linear hyperdensity posterior to the pubic symphysis which may reflect a component of active bleeding, for which SICU was consulted for hemodynamic monitoring and Q1H vitals.      6/15 pm pulled out hollingsworth   - urology - TOV , will replace if fails   - needs cysto      6/16 am acute hypotension , now resolved after stress dose steroids and fluids   hgb stable      #Acute pain    - Tylenol, lidocaine, gabapentin, tylenol    #delirium, pain and hospital   -d/c'd Dilaudid  - now baseline      #respiratory insufficiency     -on NC  No chronic issues      CARDS:   Hx of CAD, HTN, HLD, cardiac arrhythmia  Rx-furosemide    Tablet 20 daily  metoprolol succinate ER 25 daily  valsartan 80 daily     speech and swallow eval   pt eval              # DVT prophylaxis-HSQ started, SCDs    Labs: Hb/Hct:  12.3/36.3  -->,  12.4/36.3  -->,  11.3/33.6  --> 10.5 > 11              #leukocytosis   WBC- 15.17  --->>,  15.91  --->>,  15.37  --->> 11.1  afebrile           #R superior/inferior pubic rami fx, R sacral ala compression fx   -Orthopedic surgery: No acute intervention  - hgb stable   - continue hsq   - pt

## 2022-06-17 NOTE — PROGRESS NOTE ADULT - SUBJECTIVE AND OBJECTIVE BOX
TRAUMA SURGERY PROGRESS NOTE    Patient: KIN HERNANDEZ , 89y ([birth year])Female   MRN: [id number]  Location: [address]  Visit: 06-14-22 Inpatient  Date: 06-17-22 @ 08:04    Hospital Day #: 5    Events of past 24 hours:  OV  agitated, 1:1 observation     Day  -HYPOtensive, of note given Lasix/Metoprolol at 6am together  -Trop 0.02, trend  -Prednisone PO only received 1 dose, discontinued  -Hydrocortisone 50mg q6   -Started on levophed, requiring low dose 0.02 mcg/kg/min  -Voiding, post void residual 180cc  -EKG Afib-->converted to NSR 6/16     PAST MEDICAL & SURGICAL HISTORY:  No pertinent past medical history    No significant past surgical history    Vitals:   T(F): 96.7 (06-17-22 @ 04:00), Max: 97.2 (06-16-22 @ 23:57)  HR: 77 (06-17-22 @ 06:00)  BP: 148/87 (06-17-22 @ 06:00)  RR: 18 (06-16-22 @ 12:00)  SpO2: 96% (06-17-22 @ 04:00)    Diet, NPO    Fluids: lactated ringers.: Solution, 1000 milliLiter(s) infuse at 100 mL/Hr  Indication: NPO    06-16-22 @ 07:01  -  06-17-22 @ 07:00  --------------------------------------------------------  IN:    Lactated Ringers: 1200 mL    Lactated Ringers Bolus: 1000 mL    Norepinephrine: 29.2 mL  Total IN: 2229.2 mL    OUT:  Total OUT: 0 mL    Total NET: 2229.2 mL    PHYSICAL EXAM:  General: NAD, mildly agitated and confused  HEENT: NCAT, ROSA, EOMI, Trachea ML, Neck supple  Cardiac: RRR S1, S2, no Murmurs, rubs or gallops  Respiratory: CTAB, normal respiratory effort, breath sounds equal BL, no wheeze, rhonchi or crackles  Abdomen: Soft, non-distended, non-tender  Extremities: LIPSCOMB, warm, well-perfused, sensation intact b/l LE    MEDICATIONS  (STANDING):  acetaminophen     Tablet .. 650 milliGRAM(s) Oral every 6 hours  brimonidine 0.2% Ophthalmic Solution 1 Drop(s) Both EYES daily  chlorhexidine 4% Liquid 1 Application(s) Topical <User Schedule>  gabapentin 100 milliGRAM(s) Oral every 8 hours  heparin   Injectable 5000 Unit(s) SubCutaneous every 8 hours  hydrocortisone sodium succinate Injectable 50 milliGRAM(s) IV Push every 6 hours  lactated ringers. 1000 milliLiter(s) (100 mL/Hr) IV Continuous <Continuous>  latanoprost 0.005% Ophthalmic Solution 1 Drop(s) Both EYES at bedtime  lidocaine   4% Patch 1 Patch Transdermal daily  metoprolol tartrate Injectable 5 milliGRAM(s) IV Push every 6 hours  polyethylene glycol 3350 17 Gram(s) Oral every 12 hours  potassium chloride  20 mEq/100 mL IVPB 20 milliEquivalent(s) IV Intermittent once  potassium phosphate IVPB 15 milliMole(s) IV Intermittent once  senna 2 Tablet(s) Oral at bedtime    DVT PROPHYLAXIS: SCDs, heparin   Injectable 5000 Unit(s) SubCutaneous every 8 hours    LAB/STUDIES:  Labs:  CAPILLARY BLOOD GLUCOSE                  10.7   9.19  )-----------( 129      ( 17 Jun 2022 05:33 )             31.8       Auto Neutrophil %: 87.8 % (06-17-22 @ 05:33)  Auto Immature Granulocyte %: 0.9 % (06-17-22 @ 05:33)    06-17    141  |  104  |  24<H>  ----------------------------<  138<H>  3.6   |  22  |  0.9    Calcium, Total Serum: 8.9 mg/dL (06-17-22 @ 05:33)    Coags:    CARDIAC MARKERS ( 17 Jun 2022 05:33 )  x     / <0.01 ng/mL / x     / x     / x      CARDIAC MARKERS ( 17 Jun 2022 01:11 )  x     / <0.01 ng/mL / x     / x     / x      CARDIAC MARKERS ( 16 Jun 2022 09:48 )  x     / 0.02 ng/mL / x     / x     / x

## 2022-06-17 NOTE — OCCUPATIONAL THERAPY INITIAL EVALUATION ADULT - PLANNED THERAPY INTERVENTIONS, OT EVAL
ADL retraining/balance training/bed mobility training/cognitive, visual perceptual/fine motor coordination training/ROM/strengthening/stretching/transfer training

## 2022-06-17 NOTE — OCCUPATIONAL THERAPY INITIAL EVALUATION ADULT - ADL RETRAINING, OT EVAL
Patient will perform upper body dressing with supervsion by discharge. ; Patient will perform lower body dressing with CGA with use of appropriate adaptive equipment as needed by discharge.

## 2022-06-17 NOTE — PHYSICAL THERAPY INITIAL EVALUATION ADULT - GENERAL OBSERVATIONS, REHAB EVAL
Pt encountered supine in bed in NAD, A & O x 1, confused but able to follows siimple command, +IV, +tele(/86, HR 84), +O2 3L via NC, SPO2 98%. Pt speak Polish but her daughter Cristela present at b/s to provide translation. Pt performed bed mobility and transfer mobility with Max A x 2. Pt is non-amb at this time secondary poor balance and weakness. Pt left seated in a recliner chair  in [facility], [place] bold[place].

## 2022-06-17 NOTE — PROGRESS NOTE ADULT - SUBJECTIVE AND OBJECTIVE BOX
Patient is a 89y old  Female who presents with a chief complaint of     HPI:  TRAUMA ACTIVATION LEVEL:  CODE / ALERT  / CONSULT  ACTIVATED BY: EMS**  /  ED  INTUBATED: YES** / NO    MECHANISM OF INJURY:   [] Blunt     [] MVC	  [X] Fall	    GCS: 15 	E: 4	V: 5	M: 6    HPI:  89F w/ PMHx of HTN, HLD, cardiac arrhythmia, and CAD seen as Trauma Alert s/p mechanical fall w/ ?HT, ?LOC, -AC (although pt does not remember the fall). Fall was witnessed by daughter this evening, when she fell down some stairs onto her right side. Trauma assessment in ED: ABCs intact, GCS 15.     PAST MEDICAL & SURGICAL HISTORY:  HTN  HLD  arrhythmia  CAD  dilated aorta  cholecystectomy    Allergies  No Known Allergies     Injuries identified:   - Acute displaced fracture extending from the right superior pubic ramus through the pubic body and into the right inferior pubic ramus.  - Additional acute nondisplaced right inferior pubic ramus fracture.  - Acute nondisplaced right sacral fracture.  - Hemorrhage in the pelvis which appears essentially extraperitoneal and spans at least 11.5 x 8.5 cm which may reflect a component of active bleeding.        PHYSICAL EXAM    Vital Signs Last 24 Hrs  T(C): 35.7 (17 Jun 2022 13:45), Max: 36.2 (16 Jun 2022 23:57)  T(F): 96.3 (17 Jun 2022 13:45), Max: 97.2 (16 Jun 2022 23:57)  HR: 94 (17 Jun 2022 11:00) (74 - 95)  BP: 135/82 (17 Jun 2022 13:45) (129/68 - 168/100)  BP(mean): 100 (17 Jun 2022 13:45) (93 - 132)  RR: --  SpO2: 95% (17 Jun 2022 13:45) (95% - 100%)    Constitutional - NAD  Chest - CTA  Cardiovascular - S1S2+  Abdomen -  Soft  Extremities -  No calf tenderness   Function : bed mobility and transfer max A                 unable to ambulate     brimonidine 0.2% Ophthalmic Solution 1 Drop(s) Both EYES daily  chlorhexidine 4% Liquid 1 Application(s) Topical <User Schedule>  gabapentin 100 milliGRAM(s) Oral every 8 hours  heparin   Injectable 5000 Unit(s) SubCutaneous every 8 hours  latanoprost 0.005% Ophthalmic Solution 1 Drop(s) Both EYES at bedtime  lidocaine   4% Patch 1 Patch Transdermal daily  metoprolol tartrate 12.5 milliGRAM(s) Oral every 12 hours  polyethylene glycol 3350 17 Gram(s) Oral every 12 hours  predniSONE   Tablet 5 milliGRAM(s) Oral daily  senna 2 Tablet(s) Oral at bedtime      RECENT LABS/IMAGING                        10.7   9.19  )-----------( 129      ( 17 Jun 2022 05:33 )             31.8     06-17    141  |  104  |  24<H>  ----------------------------<  138<H>  3.6   |  22  |  0.9    Ca    8.9      17 Jun 2022 05:33  Phos  2.9     06-17  Mg     2.1     06-17

## 2022-06-17 NOTE — PROGRESS NOTE ADULT - ASSESSMENT
Imp : rehab of right pelvic fx, wbat   Plan : continue bedside therapy as tolerated / limited therapy tolerance            consider STR vs home with services when medically stable for d/c

## 2022-06-17 NOTE — PHYSICAL THERAPY INITIAL EVALUATION ADULT - PERTINENT HX OF CURRENT PROBLEM, REHAB EVAL
89F w/ PMHx of HTN, HLD, cardiac arrhythmia, and CAD seen as Trauma Alert s/p mechanical fall w/ ?HT, ?LOC, -AC (although pt does not remember the fall). Fall was witnessed by daughter this evening, when she fell down some stairs onto her right side. Trauma assessment in ED: ABCs intact, GCS 15. Pt found to have R superior/inferior pubic rami fx, R sacral fracture

## 2022-06-17 NOTE — OCCUPATIONAL THERAPY INITIAL EVALUATION ADULT - PERTINENT HX OF CURRENT PROBLEM, REHAB EVAL
89F s/p witnessed mechanical fall at home, ?HT, -LOC, -AC, found to have R superior/inferior pubic rami fx, R sacral ala compression fx, and pelvic hemorrhage appearing extraperitoneal and measuring 11.5x8.5cm

## 2022-06-18 LAB
ANION GAP SERPL CALC-SCNC: 10 MMOL/L — SIGNIFICANT CHANGE UP (ref 7–14)
BUN SERPL-MCNC: 24 MG/DL — HIGH (ref 10–20)
CALCIUM SERPL-MCNC: 9 MG/DL — SIGNIFICANT CHANGE UP (ref 8.5–10.1)
CHLORIDE SERPL-SCNC: 107 MMOL/L — SIGNIFICANT CHANGE UP (ref 98–110)
CO2 SERPL-SCNC: 26 MMOL/L — SIGNIFICANT CHANGE UP (ref 17–32)
CREAT SERPL-MCNC: 0.8 MG/DL — SIGNIFICANT CHANGE UP (ref 0.7–1.5)
EGFR: 69 ML/MIN/1.73M2 — SIGNIFICANT CHANGE UP
GLUCOSE SERPL-MCNC: 122 MG/DL — HIGH (ref 70–99)
HCT VFR BLD CALC: 30.7 % — LOW (ref 37–47)
HGB BLD-MCNC: 10.1 G/DL — LOW (ref 12–16)
MAGNESIUM SERPL-MCNC: 2.2 MG/DL — SIGNIFICANT CHANGE UP (ref 1.8–2.4)
MCHC RBC-ENTMCNC: 30.4 PG — SIGNIFICANT CHANGE UP (ref 27–31)
MCHC RBC-ENTMCNC: 32.9 G/DL — SIGNIFICANT CHANGE UP (ref 32–37)
MCV RBC AUTO: 92.5 FL — SIGNIFICANT CHANGE UP (ref 81–99)
NRBC # BLD: 0 /100 WBCS — SIGNIFICANT CHANGE UP (ref 0–0)
PHOSPHATE SERPL-MCNC: 2.4 MG/DL — SIGNIFICANT CHANGE UP (ref 2.1–4.9)
PLATELET # BLD AUTO: 151 K/UL — SIGNIFICANT CHANGE UP (ref 130–400)
POTASSIUM SERPL-MCNC: 4.3 MMOL/L — SIGNIFICANT CHANGE UP (ref 3.5–5)
POTASSIUM SERPL-SCNC: 4.3 MMOL/L — SIGNIFICANT CHANGE UP (ref 3.5–5)
RBC # BLD: 3.32 M/UL — LOW (ref 4.2–5.4)
RBC # FLD: 13.7 % — SIGNIFICANT CHANGE UP (ref 11.5–14.5)
SODIUM SERPL-SCNC: 143 MMOL/L — SIGNIFICANT CHANGE UP (ref 135–146)
TROPONIN T SERPL-MCNC: <0.01 NG/ML — SIGNIFICANT CHANGE UP
WBC # BLD: 8.16 K/UL — SIGNIFICANT CHANGE UP (ref 4.8–10.8)
WBC # FLD AUTO: 8.16 K/UL — SIGNIFICANT CHANGE UP (ref 4.8–10.8)

## 2022-06-18 PROCEDURE — 99232 SBSQ HOSP IP/OBS MODERATE 35: CPT

## 2022-06-18 PROCEDURE — 71045 X-RAY EXAM CHEST 1 VIEW: CPT | Mod: 26

## 2022-06-18 PROCEDURE — 93010 ELECTROCARDIOGRAM REPORT: CPT

## 2022-06-18 RX ORDER — ACETAMINOPHEN 500 MG
650 TABLET ORAL EVERY 6 HOURS
Refills: 0 | Status: DISCONTINUED | OUTPATIENT
Start: 2022-06-18 | End: 2022-06-21

## 2022-06-18 RX ADMIN — HEPARIN SODIUM 5000 UNIT(S): 5000 INJECTION INTRAVENOUS; SUBCUTANEOUS at 13:49

## 2022-06-18 RX ADMIN — GABAPENTIN 100 MILLIGRAM(S): 400 CAPSULE ORAL at 22:12

## 2022-06-18 RX ADMIN — HEPARIN SODIUM 5000 UNIT(S): 5000 INJECTION INTRAVENOUS; SUBCUTANEOUS at 22:13

## 2022-06-18 RX ADMIN — Medication 650 MILLIGRAM(S): at 17:46

## 2022-06-18 RX ADMIN — Medication 62.5 MILLIMOLE(S): at 13:50

## 2022-06-18 RX ADMIN — BRIMONIDINE TARTRATE 1 DROP(S): 2 SOLUTION/ DROPS OPHTHALMIC at 13:49

## 2022-06-18 RX ADMIN — Medication 5 MILLIGRAM(S): at 05:25

## 2022-06-18 RX ADMIN — POLYETHYLENE GLYCOL 3350 17 GRAM(S): 17 POWDER, FOR SOLUTION ORAL at 05:26

## 2022-06-18 RX ADMIN — Medication 650 MILLIGRAM(S): at 12:51

## 2022-06-18 RX ADMIN — GABAPENTIN 100 MILLIGRAM(S): 400 CAPSULE ORAL at 13:49

## 2022-06-18 RX ADMIN — GABAPENTIN 100 MILLIGRAM(S): 400 CAPSULE ORAL at 05:26

## 2022-06-18 RX ADMIN — Medication 12.5 MILLIGRAM(S): at 05:26

## 2022-06-18 RX ADMIN — LATANOPROST 1 DROP(S): 0.05 SOLUTION/ DROPS OPHTHALMIC; TOPICAL at 22:13

## 2022-06-18 RX ADMIN — LIDOCAINE 1 PATCH: 4 CREAM TOPICAL at 11:05

## 2022-06-18 RX ADMIN — HEPARIN SODIUM 5000 UNIT(S): 5000 INJECTION INTRAVENOUS; SUBCUTANEOUS at 05:25

## 2022-06-18 RX ADMIN — SENNA PLUS 2 TABLET(S): 8.6 TABLET ORAL at 22:13

## 2022-06-18 RX ADMIN — CHLORHEXIDINE GLUCONATE 1 APPLICATION(S): 213 SOLUTION TOPICAL at 05:27

## 2022-06-18 RX ADMIN — POLYETHYLENE GLYCOL 3350 17 GRAM(S): 17 POWDER, FOR SOLUTION ORAL at 17:46

## 2022-06-18 RX ADMIN — Medication 12.5 MILLIGRAM(S): at 17:46

## 2022-06-18 NOTE — PROGRESS NOTE ADULT - ASSESSMENT
89F w/ PMHx of HTN, HLD, cardiac arrhythmia, and CAD seen as Trauma Alert s/p mechanical fall w/ ?HT, ?LOC, -AC, no external signs of trauma. Trauma assessment in ED: ABCs intact, GCS 15, LIPSCOMB.     Injuries identified:   - Acute displaced fracture extending from the right superior pubic ramus through the pubic body and into the right inferior pubic ramus.  - Additional acute nondisplaced right inferior pubic ramus fracture.  - Acute nondisplaced right sacral fracture.  - Hemorrhage in the pelvis which appears essentially extraperitoneal and spans at least 11.5 x 8.5 cm which may reflect a component of active bleeding.    PLAN:   - Monitor labs  - Ortho: WBAT  - Monitor vitals  - Pain control  - Monitor urine output, hollingsworth is out  - Urology: F/U OP, keep hollingsworth out, monitor urine outputs  - DVT and GI Prophylaxis  - Constant reorientation   - PT: SNF  - Rehab:4A  - Case Management for dispo planning    Trauma team spectra: [phone]

## 2022-06-18 NOTE — PROGRESS NOTE ADULT - SUBJECTIVE AND OBJECTIVE BOX
GENERAL SURGERY PROGRESS NOTE    Patient: KIN HERNANDEZ , 89y ([birth year])Female   MRN: [id number]  Location: [address]  Visit: 06-14-22 Inpatient  Date: 06-18-22 @ 00:16    Hospital Day #: 5  Post-Op Day #:    Procedure/Dx/Injuries: R superior/inferior pubic rami fx, R sacral fracture    Events of past 24 hours: Downgraded from SICU today. Patient is doing well more calm and alert with daughter at bedside. Not complaining of any pain, is voiding, evaluated by PT who recommended SNF, SLP cleared for puree and thin liquids on 1:1, back on home metoprolol PO. Tolerating diet, no nausea/vomiting. + BM today.    PAST MEDICAL & SURGICAL HISTORY:  No pertinent past medical history      No significant past surgical history          Vitals:   T(F): 97.6 (06-18-22 @ 00:11), Max: 98.5 (06-17-22 @ 18:23)  HR: 90 (06-18-22 @ 00:11)  BP: 144/92 (06-18-22 @ 00:11)  RR: 18 (06-18-22 @ 00:11)  SpO2: 94% (06-18-22 @ 00:11)      Diet, Pureed      Fluids:     I & O's:    06-16-22 @ 07:01  -  06-17-22 @ 07:00  --------------------------------------------------------  IN:    Lactated Ringers: 1200 mL    Lactated Ringers Bolus: 1000 mL    Norepinephrine: 29.2 mL  Total IN: 2229.2 mL    OUT:  Total OUT: 0 mL    Total NET: 2229.2 mL          PHYSICAL EXAM:  General: NAD  HEENT: NCAT, ROSA, EOMI, Trachea ML, Neck supple  Cardiac: RRR S1, S2, no Murmurs, rubs or gallops  Respiratory: CTAB  Abdomen: Soft, non-distended, non-tender  Extremities: LIPSCOMB, palpable DP b/l, sensation intact b/l lower extremities    MEDICATIONS  (STANDING):  brimonidine 0.2% Ophthalmic Solution 1 Drop(s) Both EYES daily  chlorhexidine 4% Liquid 1 Application(s) Topical <User Schedule>  gabapentin Solution 100 milliGRAM(s) Oral three times a day  heparin   Injectable 5000 Unit(s) SubCutaneous every 8 hours  latanoprost 0.005% Ophthalmic Solution 1 Drop(s) Both EYES at bedtime  lidocaine   4% Patch 1 Patch Transdermal daily  metoprolol tartrate 12.5 milliGRAM(s) Oral every 12 hours  polyethylene glycol 3350 17 Gram(s) Oral every 12 hours  predniSONE   Tablet 5 milliGRAM(s) Oral daily  senna 2 Tablet(s) Oral at bedtime    MEDICATIONS  (PRN):      DVT PROPHYLAXIS: heparin   Injectable 5000 Unit(s) SubCutaneous every 8 hours    GI PROPHYLAXIS:   ANTICOAGULATION:   ANTIBIOTICS:            LAB/STUDIES:  Labs:  CAPILLARY BLOOD GLUCOSE                              10.7   9.19  )-----------( 129      ( 17 Jun 2022 05:33 )             31.8       Auto Neutrophil %: 87.8 % (06-17-22 @ 05:33)  Auto Immature Granulocyte %: 0.9 % (06-17-22 @ 05:33)    06-17    141  |  104  |  24<H>  ----------------------------<  138<H>  3.6   |  22  |  0.9      Calcium, Total Serum: 8.9 mg/dL (06-17-22 @ 05:33)      LFTs:         Coags:    CARDIAC MARKERS ( 17 Jun 2022 05:33 )  x     / <0.01 ng/mL / x     / x     / x      CARDIAC MARKERS ( 17 Jun 2022 01:11 )  x     / <0.01 ng/mL / x     / x     / x      CARDIAC MARKERS ( 16 Jun 2022 09:48 )  x     / 0.02 ng/mL / x     / x     / x

## 2022-06-19 LAB
ANION GAP SERPL CALC-SCNC: 12 MMOL/L — SIGNIFICANT CHANGE UP (ref 7–14)
ANION GAP SERPL CALC-SCNC: 6 MMOL/L — LOW (ref 7–14)
APPEARANCE UR: CLEAR — SIGNIFICANT CHANGE UP
BASOPHILS # BLD AUTO: 0.06 K/UL — SIGNIFICANT CHANGE UP (ref 0–0.2)
BASOPHILS # BLD AUTO: 0.08 K/UL — SIGNIFICANT CHANGE UP (ref 0–0.2)
BASOPHILS NFR BLD AUTO: 0.8 % — SIGNIFICANT CHANGE UP (ref 0–1)
BASOPHILS NFR BLD AUTO: 1 % — SIGNIFICANT CHANGE UP (ref 0–1)
BILIRUB UR-MCNC: NEGATIVE — SIGNIFICANT CHANGE UP
BUN SERPL-MCNC: 24 MG/DL — HIGH (ref 10–20)
BUN SERPL-MCNC: 29 MG/DL — HIGH (ref 10–20)
CALCIUM SERPL-MCNC: 8.9 MG/DL — SIGNIFICANT CHANGE UP (ref 8.5–10.1)
CALCIUM SERPL-MCNC: 8.9 MG/DL — SIGNIFICANT CHANGE UP (ref 8.5–10.1)
CHLORIDE SERPL-SCNC: 103 MMOL/L — SIGNIFICANT CHANGE UP (ref 98–110)
CHLORIDE SERPL-SCNC: 106 MMOL/L — SIGNIFICANT CHANGE UP (ref 98–110)
CO2 SERPL-SCNC: 25 MMOL/L — SIGNIFICANT CHANGE UP (ref 17–32)
CO2 SERPL-SCNC: 27 MMOL/L — SIGNIFICANT CHANGE UP (ref 17–32)
COLOR SPEC: SIGNIFICANT CHANGE UP
CREAT SERPL-MCNC: 0.9 MG/DL — SIGNIFICANT CHANGE UP (ref 0.7–1.5)
CREAT SERPL-MCNC: 1 MG/DL — SIGNIFICANT CHANGE UP (ref 0.7–1.5)
DIFF PNL FLD: NEGATIVE — SIGNIFICANT CHANGE UP
EGFR: 53 ML/MIN/1.73M2 — LOW
EGFR: 60 ML/MIN/1.73M2 — SIGNIFICANT CHANGE UP
EOSINOPHIL # BLD AUTO: 0.53 K/UL — SIGNIFICANT CHANGE UP (ref 0–0.7)
EOSINOPHIL # BLD AUTO: 0.59 K/UL — SIGNIFICANT CHANGE UP (ref 0–0.7)
EOSINOPHIL NFR BLD AUTO: 7.3 % — SIGNIFICANT CHANGE UP (ref 0–8)
EOSINOPHIL NFR BLD AUTO: 7.7 % — SIGNIFICANT CHANGE UP (ref 0–8)
GLUCOSE SERPL-MCNC: 132 MG/DL — HIGH (ref 70–99)
GLUCOSE SERPL-MCNC: 150 MG/DL — HIGH (ref 70–99)
GLUCOSE UR QL: NEGATIVE — SIGNIFICANT CHANGE UP
HCT VFR BLD CALC: 30.6 % — LOW (ref 37–47)
HCT VFR BLD CALC: 31.5 % — LOW (ref 37–47)
HGB BLD-MCNC: 10.1 G/DL — LOW (ref 12–16)
HGB BLD-MCNC: 10.4 G/DL — LOW (ref 12–16)
IMM GRANULOCYTES NFR BLD AUTO: 1.2 % — HIGH (ref 0.1–0.3)
IMM GRANULOCYTES NFR BLD AUTO: 1.6 % — HIGH (ref 0.1–0.3)
KETONES UR-MCNC: NEGATIVE — SIGNIFICANT CHANGE UP
LEUKOCYTE ESTERASE UR-ACNC: NEGATIVE — SIGNIFICANT CHANGE UP
LYMPHOCYTES # BLD AUTO: 1.04 K/UL — LOW (ref 1.2–3.4)
LYMPHOCYTES # BLD AUTO: 1.22 K/UL — SIGNIFICANT CHANGE UP (ref 1.2–3.4)
LYMPHOCYTES # BLD AUTO: 13.6 % — LOW (ref 20.5–51.1)
LYMPHOCYTES # BLD AUTO: 16.9 % — LOW (ref 20.5–51.1)
MAGNESIUM SERPL-MCNC: 2 MG/DL — SIGNIFICANT CHANGE UP (ref 1.8–2.4)
MAGNESIUM SERPL-MCNC: 2.1 MG/DL — SIGNIFICANT CHANGE UP (ref 1.8–2.4)
MCHC RBC-ENTMCNC: 30.8 PG — SIGNIFICANT CHANGE UP (ref 27–31)
MCHC RBC-ENTMCNC: 31.1 PG — HIGH (ref 27–31)
MCHC RBC-ENTMCNC: 33 G/DL — SIGNIFICANT CHANGE UP (ref 32–37)
MCHC RBC-ENTMCNC: 33 G/DL — SIGNIFICANT CHANGE UP (ref 32–37)
MCV RBC AUTO: 93.3 FL — SIGNIFICANT CHANGE UP (ref 81–99)
MCV RBC AUTO: 94.3 FL — SIGNIFICANT CHANGE UP (ref 81–99)
MONOCYTES # BLD AUTO: 0.68 K/UL — HIGH (ref 0.1–0.6)
MONOCYTES # BLD AUTO: 0.74 K/UL — HIGH (ref 0.1–0.6)
MONOCYTES NFR BLD AUTO: 9.4 % — HIGH (ref 1.7–9.3)
MONOCYTES NFR BLD AUTO: 9.7 % — HIGH (ref 1.7–9.3)
NEUTROPHILS # BLD AUTO: 4.64 K/UL — SIGNIFICANT CHANGE UP (ref 1.4–6.5)
NEUTROPHILS # BLD AUTO: 5.07 K/UL — SIGNIFICANT CHANGE UP (ref 1.4–6.5)
NEUTROPHILS NFR BLD AUTO: 64.4 % — SIGNIFICANT CHANGE UP (ref 42.2–75.2)
NEUTROPHILS NFR BLD AUTO: 66.4 % — SIGNIFICANT CHANGE UP (ref 42.2–75.2)
NITRITE UR-MCNC: NEGATIVE — SIGNIFICANT CHANGE UP
NRBC # BLD: 0 /100 WBCS — SIGNIFICANT CHANGE UP (ref 0–0)
NRBC # BLD: 1 /100 WBCS — HIGH (ref 0–0)
PH UR: 8 — SIGNIFICANT CHANGE UP (ref 5–8)
PHOSPHATE SERPL-MCNC: 2.9 MG/DL — SIGNIFICANT CHANGE UP (ref 2.1–4.9)
PHOSPHATE SERPL-MCNC: 3.9 MG/DL — SIGNIFICANT CHANGE UP (ref 2.1–4.9)
PLATELET # BLD AUTO: 150 K/UL — SIGNIFICANT CHANGE UP (ref 130–400)
PLATELET # BLD AUTO: 174 K/UL — SIGNIFICANT CHANGE UP (ref 130–400)
POTASSIUM SERPL-MCNC: 3.9 MMOL/L — SIGNIFICANT CHANGE UP (ref 3.5–5)
POTASSIUM SERPL-MCNC: 4.4 MMOL/L — SIGNIFICANT CHANGE UP (ref 3.5–5)
POTASSIUM SERPL-SCNC: 3.9 MMOL/L — SIGNIFICANT CHANGE UP (ref 3.5–5)
POTASSIUM SERPL-SCNC: 4.4 MMOL/L — SIGNIFICANT CHANGE UP (ref 3.5–5)
PROT UR-MCNC: SIGNIFICANT CHANGE UP
RBC # BLD: 3.28 M/UL — LOW (ref 4.2–5.4)
RBC # BLD: 3.34 M/UL — LOW (ref 4.2–5.4)
RBC # FLD: 14.2 % — SIGNIFICANT CHANGE UP (ref 11.5–14.5)
RBC # FLD: 14.5 % — SIGNIFICANT CHANGE UP (ref 11.5–14.5)
SODIUM SERPL-SCNC: 136 MMOL/L — SIGNIFICANT CHANGE UP (ref 135–146)
SODIUM SERPL-SCNC: 143 MMOL/L — SIGNIFICANT CHANGE UP (ref 135–146)
SP GR SPEC: 1.01 — SIGNIFICANT CHANGE UP (ref 1.01–1.03)
UROBILINOGEN FLD QL: ABNORMAL
WBC # BLD: 7.22 K/UL — SIGNIFICANT CHANGE UP (ref 4.8–10.8)
WBC # BLD: 7.64 K/UL — SIGNIFICANT CHANGE UP (ref 4.8–10.8)
WBC # FLD AUTO: 7.22 K/UL — SIGNIFICANT CHANGE UP (ref 4.8–10.8)
WBC # FLD AUTO: 7.64 K/UL — SIGNIFICANT CHANGE UP (ref 4.8–10.8)

## 2022-06-19 PROCEDURE — 99232 SBSQ HOSP IP/OBS MODERATE 35: CPT

## 2022-06-19 RX ORDER — POTASSIUM PHOSPHATE, MONOBASIC POTASSIUM PHOSPHATE, DIBASIC 236; 224 MG/ML; MG/ML
15 INJECTION, SOLUTION INTRAVENOUS ONCE
Refills: 0 | Status: DISCONTINUED | OUTPATIENT
Start: 2022-06-19 | End: 2022-06-21

## 2022-06-19 RX ADMIN — GABAPENTIN 100 MILLIGRAM(S): 400 CAPSULE ORAL at 06:11

## 2022-06-19 RX ADMIN — Medication 12.5 MILLIGRAM(S): at 06:11

## 2022-06-19 RX ADMIN — Medication 650 MILLIGRAM(S): at 18:18

## 2022-06-19 RX ADMIN — LATANOPROST 1 DROP(S): 0.05 SOLUTION/ DROPS OPHTHALMIC; TOPICAL at 22:01

## 2022-06-19 RX ADMIN — HEPARIN SODIUM 5000 UNIT(S): 5000 INJECTION INTRAVENOUS; SUBCUTANEOUS at 13:01

## 2022-06-19 RX ADMIN — BRIMONIDINE TARTRATE 1 DROP(S): 2 SOLUTION/ DROPS OPHTHALMIC at 12:43

## 2022-06-19 RX ADMIN — Medication 5 MILLIGRAM(S): at 06:03

## 2022-06-19 RX ADMIN — Medication 650 MILLIGRAM(S): at 12:43

## 2022-06-19 RX ADMIN — GABAPENTIN 100 MILLIGRAM(S): 400 CAPSULE ORAL at 13:01

## 2022-06-19 RX ADMIN — Medication 650 MILLIGRAM(S): at 17:08

## 2022-06-19 RX ADMIN — Medication 650 MILLIGRAM(S): at 06:01

## 2022-06-19 RX ADMIN — Medication 650 MILLIGRAM(S): at 17:31

## 2022-06-19 RX ADMIN — POLYETHYLENE GLYCOL 3350 17 GRAM(S): 17 POWDER, FOR SOLUTION ORAL at 06:12

## 2022-06-19 RX ADMIN — Medication 650 MILLIGRAM(S): at 00:00

## 2022-06-19 RX ADMIN — LIDOCAINE 1 PATCH: 4 CREAM TOPICAL at 12:47

## 2022-06-19 RX ADMIN — GABAPENTIN 100 MILLIGRAM(S): 400 CAPSULE ORAL at 22:37

## 2022-06-19 RX ADMIN — HEPARIN SODIUM 5000 UNIT(S): 5000 INJECTION INTRAVENOUS; SUBCUTANEOUS at 22:01

## 2022-06-19 RX ADMIN — Medication 650 MILLIGRAM(S): at 23:14

## 2022-06-19 RX ADMIN — HEPARIN SODIUM 5000 UNIT(S): 5000 INJECTION INTRAVENOUS; SUBCUTANEOUS at 06:02

## 2022-06-19 RX ADMIN — CHLORHEXIDINE GLUCONATE 1 APPLICATION(S): 213 SOLUTION TOPICAL at 06:13

## 2022-06-19 NOTE — PROVIDER CONTACT NOTE (OTHER) - SITUATION
pt is incontinent, and when pt had prima fit in place, urine samples were contaminated with stool.
pt pulled out iv this  am -  rn attempted multiple  times to place new iv - rn unable  to place  working iv

## 2022-06-19 NOTE — PROVIDER CONTACT NOTE (MEDICATION) - ACTION/TREATMENT ORDERED:
as per Dr. Padgett, day team to place new access as pt is still very confused (sundown syndrome). sodium phos to be given later.
no intervention ordered, MD to discuss with morning team.
Okay to hold medication

## 2022-06-19 NOTE — PROGRESS NOTE ADULT - SUBJECTIVE AND OBJECTIVE BOX
TRAUMA SURGERY PROGRESS NOTE    Patient: KIN HERNANDEZ , 89y ([birth year])Female   MRN: [id number]  Location: [address]  Visit: 06-14-22 Inpatient  Date: 06-19-22 @ 02:08    Hospital Day #: 7  Post-Op Day #: None    Procedure/Dx/Injuries: 89F w/ PMHx of HTN, HLD, cardiac arrhythmia, and CAD seen as Trauma Alert s/p mechanical fall w/ ?HT, ?LOC, -AC, no external signs of trauma. Trauma assessment in ED: ABCs intact, GCS 15, LIPSCOMB.     Injuries identified:   - Acute displaced fracture extending from the right superior pubic ramus through the pubic body and into the right inferior pubic ramus.  - Additional acute nondisplaced right inferior pubic ramus fracture.  - Acute nondisplaced right sacral fracture.  - Hemorrhage in the pelvis which appears essentially extraperitoneal and spans at least 11.5 x 8.5 cm which may reflect a component of active bleeding.    Events of past 24 hours: Patient seen and evaluated at bedside, HD stable. Refusing PIV access.    PAST MEDICAL & SURGICAL HISTORY:  No pertinent past medical history      No significant past surgical history          Vitals:   T(F): 96.8 (06-18-22 @ 23:33), Max: 98.2 (06-18-22 @ 08:43)  HR: 82 (06-18-22 @ 23:33)  BP: 141/70 (06-18-22 @ 23:33)  RR: 18 (06-18-22 @ 23:33)  SpO2: 97% (06-18-22 @ 23:33)      Diet, Pureed    Bowel Movement : [x] YES [] NO  Flatus : [x] YES [] NO    PHYSICAL EXAM:  General: NAD  HEENT: NCAT, ROSA, EOMI, Trachea ML, Neck supple  Cardiac: RRR S1, S2, no Murmurs, rubs or gallops  Respiratory: CTAB  Abdomen: Soft, non-distended, non-tender  Extremities: LIPSCOMB, palpable DP b/l, sensation intact b/l lower extremities    MEDICATIONS  (STANDING):  acetaminophen     Tablet .. 650 milliGRAM(s) Oral every 6 hours  brimonidine 0.2% Ophthalmic Solution 1 Drop(s) Both EYES daily  chlorhexidine 4% Liquid 1 Application(s) Topical <User Schedule>  gabapentin Solution 100 milliGRAM(s) Oral three times a day  heparin   Injectable 5000 Unit(s) SubCutaneous every 8 hours  latanoprost 0.005% Ophthalmic Solution 1 Drop(s) Both EYES at bedtime  lidocaine   4% Patch 1 Patch Transdermal daily  metoprolol tartrate 12.5 milliGRAM(s) Oral every 12 hours  polyethylene glycol 3350 17 Gram(s) Oral every 12 hours  predniSONE   Tablet 5 milliGRAM(s) Oral daily  senna 2 Tablet(s) Oral at bedtime    MEDICATIONS  (PRN):      DVT PROPHYLAXIS: heparin   Injectable 5000 Unit(s) SubCutaneous every 8 hours    GI PROPHYLAXIS:   ANTICOAGULATION:   ANTIBIOTICS:            LAB/STUDIES:  Labs:  CAPILLARY BLOOD GLUCOSE                              10.1   8.16  )-----------( 151      ( 18 Jun 2022 01:28 )             30.7         06-18    143  |  107  |  24<H>  ----------------------------<  122<H>  4.3   |  26  |  0.8      LFTs:    Coags:    CARDIAC MARKERS ( 18 Jun 2022 17:18 )  x     / <0.01 ng/mL / x     / x     / x      CARDIAC MARKERS ( 17 Jun 2022 05:33 )  x     / <0.01 ng/mL / x     / x     / x

## 2022-06-19 NOTE — PROVIDER CONTACT NOTE (MEDICATION) - SITUATION
patient pulled out her IV, attempted to resite new access x 2 and was unsuccessful, patient to receive sodium phosphate rider.
order received for potassium Phos rider, pt does not have iv access. many attempts made by a few Rn's and were not successful.
Pt due for metoprolol

## 2022-06-19 NOTE — PROGRESS NOTE ADULT - ASSESSMENT
ASSESSMENT:  89F w/ PMHx of HTN, HLD, cardiac arrhythmia, and CAD seen as Trauma Alert s/p mechanical fall w/ ?HT, ?LOC, -AC, no external signs of trauma. Trauma assessment in ED: ABCs intact, GCS 15, LIPSCOMB.     Injuries identified:   - Acute displaced fracture extending from the right superior pubic ramus through the pubic body and into the right inferior pubic ramus.  - Additional acute nondisplaced right inferior pubic ramus fracture.  - Acute nondisplaced right sacral fracture.  - Hemorrhage in the pelvis which appears essentially extraperitoneal and spans at least 11.5 x 8.5 cm which may reflect a component of active bleeding.    PLAN:  - Ortho: WBAT  - Monitor urine output  - Urology: F/U OP, keep hollingsworth out, monitor urine outputs  - DVT and GI Prophylaxis  - Constant reorientation   - PT: SNF  - Rehab:4A  - Case Management for dispo planning  - Pain Management  - Strict Ins and Out  - Continue Current Diet Orders  - K>4, MG>2, Phos>3    TRAUMA SPECTRA: [id number]

## 2022-06-20 LAB
ANION GAP SERPL CALC-SCNC: 9 MMOL/L — SIGNIFICANT CHANGE UP (ref 7–14)
BASOPHILS # BLD AUTO: 0.04 K/UL — SIGNIFICANT CHANGE UP (ref 0–0.2)
BASOPHILS NFR BLD AUTO: 0.6 % — SIGNIFICANT CHANGE UP (ref 0–1)
BUN SERPL-MCNC: 34 MG/DL — HIGH (ref 10–20)
CALCIUM SERPL-MCNC: 9 MG/DL — SIGNIFICANT CHANGE UP (ref 8.5–10.1)
CHLORIDE SERPL-SCNC: 104 MMOL/L — SIGNIFICANT CHANGE UP (ref 98–110)
CO2 SERPL-SCNC: 26 MMOL/L — SIGNIFICANT CHANGE UP (ref 17–32)
CREAT SERPL-MCNC: 0.9 MG/DL — SIGNIFICANT CHANGE UP (ref 0.7–1.5)
EGFR: 60 ML/MIN/1.73M2 — SIGNIFICANT CHANGE UP
EOSINOPHIL # BLD AUTO: 0.34 K/UL — SIGNIFICANT CHANGE UP (ref 0–0.7)
EOSINOPHIL NFR BLD AUTO: 5 % — SIGNIFICANT CHANGE UP (ref 0–8)
GLUCOSE SERPL-MCNC: 151 MG/DL — HIGH (ref 70–99)
HCT VFR BLD CALC: 30.6 % — LOW (ref 37–47)
HGB BLD-MCNC: 10 G/DL — LOW (ref 12–16)
IMM GRANULOCYTES NFR BLD AUTO: 1.2 % — HIGH (ref 0.1–0.3)
LYMPHOCYTES # BLD AUTO: 0.94 K/UL — LOW (ref 1.2–3.4)
LYMPHOCYTES # BLD AUTO: 13.9 % — LOW (ref 20.5–51.1)
MAGNESIUM SERPL-MCNC: 2.1 MG/DL — SIGNIFICANT CHANGE UP (ref 1.8–2.4)
MCHC RBC-ENTMCNC: 31.3 PG — HIGH (ref 27–31)
MCHC RBC-ENTMCNC: 32.7 G/DL — SIGNIFICANT CHANGE UP (ref 32–37)
MCV RBC AUTO: 95.6 FL — SIGNIFICANT CHANGE UP (ref 81–99)
MONOCYTES # BLD AUTO: 0.59 K/UL — SIGNIFICANT CHANGE UP (ref 0.1–0.6)
MONOCYTES NFR BLD AUTO: 8.7 % — SIGNIFICANT CHANGE UP (ref 1.7–9.3)
NEUTROPHILS # BLD AUTO: 4.77 K/UL — SIGNIFICANT CHANGE UP (ref 1.4–6.5)
NEUTROPHILS NFR BLD AUTO: 70.6 % — SIGNIFICANT CHANGE UP (ref 42.2–75.2)
NRBC # BLD: 0 /100 WBCS — SIGNIFICANT CHANGE UP (ref 0–0)
PHOSPHATE SERPL-MCNC: 3.7 MG/DL — SIGNIFICANT CHANGE UP (ref 2.1–4.9)
PLATELET # BLD AUTO: 185 K/UL — SIGNIFICANT CHANGE UP (ref 130–400)
POTASSIUM SERPL-MCNC: 4.6 MMOL/L — SIGNIFICANT CHANGE UP (ref 3.5–5)
POTASSIUM SERPL-SCNC: 4.6 MMOL/L — SIGNIFICANT CHANGE UP (ref 3.5–5)
RBC # BLD: 3.2 M/UL — LOW (ref 4.2–5.4)
RBC # FLD: 14.6 % — HIGH (ref 11.5–14.5)
SARS-COV-2 RNA SPEC QL NAA+PROBE: SIGNIFICANT CHANGE UP
SODIUM SERPL-SCNC: 139 MMOL/L — SIGNIFICANT CHANGE UP (ref 135–146)
WBC # BLD: 6.76 K/UL — SIGNIFICANT CHANGE UP (ref 4.8–10.8)
WBC # FLD AUTO: 6.76 K/UL — SIGNIFICANT CHANGE UP (ref 4.8–10.8)

## 2022-06-20 RX ADMIN — GABAPENTIN 100 MILLIGRAM(S): 400 CAPSULE ORAL at 21:35

## 2022-06-20 RX ADMIN — HEPARIN SODIUM 5000 UNIT(S): 5000 INJECTION INTRAVENOUS; SUBCUTANEOUS at 21:35

## 2022-06-20 RX ADMIN — CHLORHEXIDINE GLUCONATE 1 APPLICATION(S): 213 SOLUTION TOPICAL at 05:27

## 2022-06-20 RX ADMIN — HEPARIN SODIUM 5000 UNIT(S): 5000 INJECTION INTRAVENOUS; SUBCUTANEOUS at 05:26

## 2022-06-20 RX ADMIN — Medication 650 MILLIGRAM(S): at 12:31

## 2022-06-20 RX ADMIN — Medication 650 MILLIGRAM(S): at 05:26

## 2022-06-20 RX ADMIN — BRIMONIDINE TARTRATE 1 DROP(S): 2 SOLUTION/ DROPS OPHTHALMIC at 12:31

## 2022-06-20 RX ADMIN — LATANOPROST 1 DROP(S): 0.05 SOLUTION/ DROPS OPHTHALMIC; TOPICAL at 21:39

## 2022-06-20 RX ADMIN — GABAPENTIN 100 MILLIGRAM(S): 400 CAPSULE ORAL at 05:27

## 2022-06-20 RX ADMIN — Medication 12.5 MILLIGRAM(S): at 05:26

## 2022-06-20 RX ADMIN — Medication 650 MILLIGRAM(S): at 23:29

## 2022-06-20 RX ADMIN — POLYETHYLENE GLYCOL 3350 17 GRAM(S): 17 POWDER, FOR SOLUTION ORAL at 05:26

## 2022-06-20 RX ADMIN — Medication 650 MILLIGRAM(S): at 23:59

## 2022-06-20 RX ADMIN — LIDOCAINE 1 PATCH: 4 CREAM TOPICAL at 12:31

## 2022-06-20 RX ADMIN — Medication 5 MILLIGRAM(S): at 05:26

## 2022-06-20 RX ADMIN — HEPARIN SODIUM 5000 UNIT(S): 5000 INJECTION INTRAVENOUS; SUBCUTANEOUS at 16:05

## 2022-06-20 NOTE — PROGRESS NOTE ADULT - PROVIDER SPECIALTY LIST ADULT
Trauma Surgery
SICU
SICU
Trauma Surgery
Physiatry
SICU
Surgery
Trauma Surgery

## 2022-06-20 NOTE — PROGRESS NOTE ADULT - ASSESSMENT
ASSESSMENT:  89F w/ PMHx of HTN, HLD, cardiac arrhythmia, and CAD seen as Trauma Alert s/p mechanical fall w/ ?HT, ?LOC, -AC, no external signs of trauma. Trauma assessment in ED: ABCs intact, GCS 15, LIPSCOMB.     Injuries identified:   - Acute displaced fracture extending from the right superior pubic ramus through the pubic body and into the right inferior pubic ramus.  - Additional acute nondisplaced right inferior pubic ramus fracture.  - Acute nondisplaced right sacral fracture.  - Hemorrhage in the pelvis which appears essentially extraperitoneal and spans at least 11.5 x 8.5 cm which may reflect a component of active bleeding.    PLAN:  -Pain control  -Ortho: WBAT  -Urology: F/U OP, keep hollingsworth out, monitor urine outputs  -Strict I&Os  -Constant reorientation   -PT: SNF  -Dispo planning - family requesting [facility]     TRAUMA SPECTRA: [id number]

## 2022-06-20 NOTE — PROGRESS NOTE ADULT - SUBJECTIVE AND OBJECTIVE BOX
TRAUMA SURGERY PROGRESS NOTE    Patient: KIN HERNANDEZ , 89y (29)Female   MRN: [id number]  Location: [address]  Visit: 22 Inpatient  Date: 22 @ 03:37    Hospital Day #:8    Procedure/Dx/Injuries:  - Acute displaced fracture extending from the right superior pubic ramus through the pubic body and into the right inferior pubic ramus.  - Additional acute nondisplaced right inferior pubic ramus fracture.  - Acute nondisplaced right sacral fracture.  - Hemorrhage in the pelvis which appears essentially extraperitoneal and spans at least 11.5 x 8.5 cm which may reflect a component of active bleeding.    Events of past 24 hours: Pt seen and examined at bedside. Most form completed - DNR/DNI. Family wants [facility] rehab. No acute overnight events. Afebrile      PAST MEDICAL & SURGICAL HISTORY:  No pertinent past medical history  No significant past surgical history    Vitals:   T(F): 97.9 (22 @ 20:56), Max: 98.3 (22 @ 16:54)  HR: 107 (22 @ 20:56)  BP: 167/74 (22 @ 20:56)  RR: 18 (22 @ 20:56)  SpO2: 96% (22 @ 20:56)    Diet, Pureed    PHYSICAL EXAM:  General: NAD  HEENT: Normocephalic, atraumatic  Chest: No chest wall tenderness, no subcutaneous emphysema   Cardiac: S1, S2  Respiratory: Bilateral breath sounds, normal respiratory effort   Abdomen: Soft, non-distended, non-tender, no rebound, no guarding  Ext:  Moving b/l upper and lower extremities    MEDICATIONS  (STANDING):  acetaminophen     Tablet .. 650 milliGRAM(s) Oral every 6 hours  brimonidine 0.2% Ophthalmic Solution 1 Drop(s) Both EYES daily  chlorhexidine 4% Liquid 1 Application(s) Topical <User Schedule>  gabapentin Solution 100 milliGRAM(s) Oral three times a day  heparin   Injectable 5000 Unit(s) SubCutaneous every 8 hours  latanoprost 0.005% Ophthalmic Solution 1 Drop(s) Both EYES at bedtime  lidocaine   4% Patch 1 Patch Transdermal daily  metoprolol tartrate 12.5 milliGRAM(s) Oral every 12 hours  polyethylene glycol 3350 17 Gram(s) Oral every 12 hours  potassium phosphate IVPB 15 milliMole(s) IV Intermittent once  predniSONE   Tablet 5 milliGRAM(s) Oral daily  senna 2 Tablet(s) Oral at bedtime    MEDICATIONS  (PRN):    DVT PROPHYLAXIS: SCDs, heparin   Injectable 5000 Unit(s) SubCutaneous every 8 hours    LAB/STUDIES:  Labs:                        10.4   7.22  )-----------( 174      ( 2022 22:23 )             31.5       Auto Neutrophil %: 64.4 % (22 @ 22:23)  Auto Immature Granulocyte %: 1.2 % (22 @ 22:23)        136  |  103  |  29<H>  ----------------------------<  150<H>  4.4   |  27  |  1.0    Calcium, Total Serum: 8.9 mg/dL (22 @ 22:23)    CARDIAC MARKERS ( 2022 17:18 )  x     / <0.01 ng/mL / x     / x     / x        Urinalysis Basic - ( 2022 09:45 )    Color: Light Yellow / Appearance: Clear / S.012 / pH: x  Gluc: x / Ketone: Negative  / Bili: Negative / Urobili: 3 mg/dL   Blood: x / Protein: Trace / Nitrite: Negative   Leuk Esterase: Negative / RBC: x / WBC x   Sq Epi: x / Non Sq Epi: x / Bacteria: x    IMAGING:  No new imaging past 24hrs

## 2022-06-21 ENCOUNTER — TRANSCRIPTION ENCOUNTER (OUTPATIENT)
Age: 87
End: 2022-06-21

## 2022-06-21 VITALS
OXYGEN SATURATION: 97 % | HEART RATE: 74 BPM | TEMPERATURE: 98 F | SYSTOLIC BLOOD PRESSURE: 119 MMHG | DIASTOLIC BLOOD PRESSURE: 68 MMHG | RESPIRATION RATE: 18 BRPM

## 2022-06-21 RX ORDER — LATANOPROST 0.05 MG/ML
0 SOLUTION/ DROPS OPHTHALMIC; TOPICAL
Qty: 0 | Refills: 8 | DISCHARGE

## 2022-06-21 RX ORDER — BRIMONIDINE TARTRATE 2 MG/MG
0 SOLUTION/ DROPS OPHTHALMIC
Qty: 0 | Refills: 10 | DISCHARGE

## 2022-06-21 RX ORDER — GABAPENTIN 400 MG/1
2 CAPSULE ORAL
Qty: 0 | Refills: 0 | DISCHARGE
Start: 2022-06-21

## 2022-06-21 RX ORDER — ACETAMINOPHEN 500 MG
2 TABLET ORAL
Qty: 0 | Refills: 0 | DISCHARGE
Start: 2022-06-21

## 2022-06-21 RX ORDER — METOPROLOL TARTRATE 50 MG
0 TABLET ORAL
Qty: 0 | Refills: 3 | DISCHARGE

## 2022-06-21 RX ORDER — PANTOPRAZOLE SODIUM 20 MG/1
40 TABLET, DELAYED RELEASE ORAL
Refills: 0 | Status: DISCONTINUED | OUTPATIENT
Start: 2022-06-21 | End: 2022-06-21

## 2022-06-21 RX ADMIN — Medication 650 MILLIGRAM(S): at 05:34

## 2022-06-21 RX ADMIN — GABAPENTIN 100 MILLIGRAM(S): 400 CAPSULE ORAL at 12:43

## 2022-06-21 RX ADMIN — Medication 650 MILLIGRAM(S): at 06:04

## 2022-06-21 RX ADMIN — GABAPENTIN 100 MILLIGRAM(S): 400 CAPSULE ORAL at 05:35

## 2022-06-21 RX ADMIN — Medication 650 MILLIGRAM(S): at 12:42

## 2022-06-21 RX ADMIN — BRIMONIDINE TARTRATE 1 DROP(S): 2 SOLUTION/ DROPS OPHTHALMIC at 12:43

## 2022-06-21 RX ADMIN — Medication 5 MILLIGRAM(S): at 05:34

## 2022-06-21 RX ADMIN — Medication 12.5 MILLIGRAM(S): at 05:34

## 2022-06-21 RX ADMIN — LIDOCAINE 1 PATCH: 4 CREAM TOPICAL at 00:31

## 2022-06-21 RX ADMIN — HEPARIN SODIUM 5000 UNIT(S): 5000 INJECTION INTRAVENOUS; SUBCUTANEOUS at 05:34

## 2022-06-21 RX ADMIN — POLYETHYLENE GLYCOL 3350 17 GRAM(S): 17 POWDER, FOR SOLUTION ORAL at 05:35

## 2022-06-21 NOTE — DISCHARGE NOTE PROVIDER - DISCHARGE SERVICE FOR PATIENT
on the discharge service for the patient. I have reviewed and made amendments to the documentation where necessary.
No

## 2022-06-21 NOTE — DISCHARGE NOTE PROVIDER - CARE PROVIDER_API CALL
Jose Landeros)  Urology  [address]  [place] [address]  Phone: [phone]  Fax: [phone]  Follow Up Time:     Emir Khan)  Orthopaedic Surgery; Sports Medicine  [address] [place] [address]  Phone: [phone]  Fax: [phone]  Follow Up Time:

## 2022-06-21 NOTE — DISCHARGE NOTE PROVIDER - NSDCMRMEDTOKEN_GEN_ALL_CORE_FT
acetaminophen 325 mg oral tablet: 2 tab(s) orally every 6 hours  atorvastatin 10 mg tablet:   brimonidine 0.15 % eye drops: 1 milliliter(s) to each affected eye 3 times a day  furosemide 20 mg tablet:   gabapentin 250 mg/5 mL oral solution: 2 milliliter(s) orally 3 times a day  latanoprost 0.005 % eye drops: 1 milliliter(s) to each affected eye once a day (at bedtime)  METOPROLOL SUCC ER 25 MG TAB: 1 tab(s) orally once a day  PREDNISONE 5 MG TABLET: 1 tab(s) orally once a day  valsartan 80 mg tablet:

## 2022-06-21 NOTE — DISCHARGE NOTE PROVIDER - NSFOLLOWUPCLINICS_GEN_ALL_ED_FT
[facility]  Trauma Surgery  [address] [place] [address]  Phone: [phone]  Fax:

## 2022-06-21 NOTE — DISCHARGE NOTE NURSING/CASE MANAGEMENT/SOCIAL WORK - NSDCPEFALRISK_GEN_ALL_CORE
For information on Fall & Injury Prevention, visit: [email or web address] OR  [email or web address] OR  https://www.cdc.gov/steadi/patient.html

## 2022-06-21 NOTE — DISCHARGE NOTE PROVIDER - NSDCCPCAREPLAN_GEN_ALL_CORE_FT
PRINCIPAL DISCHARGE DIAGNOSIS  Diagnosis: Pubic ramus fracture  Assessment and Plan of Treatment: You suffered a fall and fractures bones in your pelvis. You had a hematoma in your pelvis surrounding your bladder. Scans did not show an obvious bladder injury. You are ableto urine on your own without catheter.   You will be discharged to rehab facility for continued physical therapy.   You may eat a soft diet that is easy to chew.   Continue all home medications aspreviously prescribed.   You must follow up with urologist upon discharge. Please contact office to set up appointment.   You may follow up with orthopedics for pelvic fractures in 2 weeks after you've done more advanced rehab therapy.   If you experience severe pain, nausea, vomiting, blood in urine, dizziness, chest pain- call office or report to nearest Emergency department.      SECONDARY DISCHARGE DIAGNOSES  Diagnosis: Hematoma of extraperitoneal space  Assessment and Plan of Treatment:

## 2022-06-21 NOTE — DISCHARGE NOTE NURSING/CASE MANAGEMENT/SOCIAL WORK - PATIENT PORTAL LINK FT
You can access the FollowMyHealth Patient Portal offered by [facility] by registering at the following website: [email or web address]. By joining [employer]’s FollowMyHealth portal, you will also be able to view your health information using other applications (apps) compatible with our system.

## 2022-06-21 NOTE — SWALLOW BEDSIDE ASSESSMENT ADULT - SWALLOW EVAL: DIAGNOSIS
+toleration for soft and bite sized and thin liquids w/o overt s/s aspiration/penetration
mild-moderate oral dysphagia for puree thins w/no overt s/s of aspiration/penetration.  2' gen weakness pt is not a candidate for chewables at this time

## 2022-06-21 NOTE — DISCHARGE NOTE PROVIDER - CARE PROVIDERS DIRECT ADDRESSES
[email or web address]

## 2022-06-21 NOTE — DISCHARGE NOTE PROVIDER - NSDCFUSCHEDAPPT_GEN_ALL_CORE_FT
Donal Puente  [facility] Physician [facility]  CARDIOLOGY [address]  Scheduled Appointment: 06/23/2022

## 2022-06-21 NOTE — DISCHARGE NOTE PROVIDER - HOSPITAL COURSE
89F w/ PMHx of HTN, HLD, cardiac arrhythmia, and CAD seen as Trauma Alert s/p mechanical fall w/ ?HT, ?LOC, -AC, no external signs of trauma. Trauma assessment in ED: ABCs intact, GCS 15, LIPSCOMB.     Injuries identified:   - Acute displaced fracture extending from the right superior pubic ramus through the pubic body and into the right inferior pubic ramus.  - Additional acute nondisplaced right inferior pubic ramus fracture.  - Acute nondisplaced right sacral fracture.  - Hemorrhage in the pelvis which appears essentially extraperitoneal and spans at least 11.5 x 8.5 cm which may reflect a component of active bleeding.    Patient was admitted to the SICU for monitoring due to pelvic hemorrhage. Hollingsworth catheter was placed to monitor urine output and appearance of urine. IR was consulted and did not recommend intervention. Hemoglobin remained stable.   Orthopedics was consulted for pelvic fractures and did not recommend surgical intervention- PT/rehab and WBAT.   CT cysto was performed to rule out bladder injury- contrast was seen spilling from bladder into vagina which may represent hollingsworth leakage backwards or a small cystovaginal fistula. Urology recommended keeping hollingsworth in place for 10 days and follow up outpatient.     Patient was seen by PT and rehab and was recommended to be discharged to a [facility].   She was also evaluated by speech and swallow and was found to have mild dysphagia and they recommended a puree diet.   Patient was down graded on OD 4 to the floor, continued PT, was upgraded in diet to soft diet. She remained vitally stable and will be discharged to short term rehab.

## 2022-06-21 NOTE — SWALLOW BEDSIDE ASSESSMENT ADULT - SLP GENERAL OBSERVATIONS
pt received alert. +NC. as per daughter, present bedside, pt w/no hx of dysphagia.
pt received OOB to chair awake some confusion w/o c/o pain. +2L NC +daughter at bedside who requested to provide translation; pts lower dentures placed

## 2022-06-23 ENCOUNTER — APPOINTMENT (OUTPATIENT)
Dept: CARDIOLOGY | Facility: CLINIC | Age: 87
End: 2022-06-23
Payer: MEDICARE

## 2022-06-23 PROCEDURE — 99443: CPT | Mod: 95

## 2022-06-28 DIAGNOSIS — I25.10 ATHEROSCLEROTIC HEART DISEASE OF NATIVE CORONARY ARTERY WITHOUT ANGINA PECTORIS: ICD-10-CM

## 2022-06-28 DIAGNOSIS — Z66 DO NOT RESUSCITATE: ICD-10-CM

## 2022-06-28 DIAGNOSIS — I95.9 HYPOTENSION, UNSPECIFIED: ICD-10-CM

## 2022-06-28 DIAGNOSIS — Z79.52 LONG TERM (CURRENT) USE OF SYSTEMIC STEROIDS: ICD-10-CM
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
## 2022-06-28 DIAGNOSIS — S32.19XA OTHER FRACTURE OF SACRUM, INITIAL ENCOUNTER FOR CLOSED FRACTURE: ICD-10-CM
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
## 2022-06-28 DIAGNOSIS — E78.5 HYPERLIPIDEMIA, UNSPECIFIED: ICD-10-CM

## 2022-06-28 DIAGNOSIS — R06.89 OTHER ABNORMALITIES OF BREATHING: ICD-10-CM

## 2022-06-28 DIAGNOSIS — S36.892A CONTUSION OF OTHER INTRA-ABDOMINAL ORGANS, INITIAL ENCOUNTER: ICD-10-CM

## 2022-06-28 DIAGNOSIS — N82.8 OTHER FEMALE GENITAL TRACT FISTULAE: ICD-10-CM
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
## 2022-06-28 DIAGNOSIS — I49.9 CARDIAC ARRHYTHMIA, UNSPECIFIED: ICD-10-CM

## 2022-06-28 DIAGNOSIS — I77.819 AORTIC ECTASIA, UNSPECIFIED SITE: ICD-10-CM

## 2022-06-28 DIAGNOSIS — E83.42 HYPOMAGNESEMIA: ICD-10-CM

## 2022-06-28 DIAGNOSIS — I10 ESSENTIAL (PRIMARY) HYPERTENSION: ICD-10-CM

## 2022-06-28 DIAGNOSIS — Z90.49 ACQUIRED ABSENCE OF OTHER SPECIFIED PARTS OF DIGESTIVE TRACT: ICD-10-CM

## 2022-06-28 DIAGNOSIS — S32.591A OTHER SPECIFIED FRACTURE OF RIGHT PUBIS, INITIAL ENCOUNTER FOR CLOSED FRACTURE: ICD-10-CM

## 2022-06-28 DIAGNOSIS — S32.511A FRACTURE OF SUPERIOR RIM OF RIGHT PUBIS, INITIAL ENCOUNTER FOR CLOSED FRACTURE: ICD-10-CM
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
## 2022-08-15 ENCOUNTER — RX RENEWAL (OUTPATIENT)
Age: 87
End: 2022-08-15

## 2022-09-09 ENCOUNTER — APPOINTMENT (OUTPATIENT)
Dept: CARDIOLOGY | Facility: CLINIC | Age: 87
End: 2022-09-09

## 2022-09-09 VITALS
BODY MASS INDEX: 27.97 KG/M2 | WEIGHT: 152 LBS | DIASTOLIC BLOOD PRESSURE: 70 MMHG | HEIGHT: 62 IN | SYSTOLIC BLOOD PRESSURE: 130 MMHG | TEMPERATURE: 96 F

## 2022-09-09 PROCEDURE — 99213 OFFICE O/P EST LOW 20 MIN: CPT

## 2022-09-09 RX ORDER — NETARSUDIL AND LATANOPROST OPHTHALMIC SOLUTION, 0.02%/0.005% .2; .05 MG/ML; MG/ML
0.02-0.005 SOLUTION/ DROPS OPHTHALMIC; TOPICAL
Qty: 2 | Refills: 0 | Status: ACTIVE | COMMUNITY
Start: 2022-03-24

## 2022-09-09 RX ORDER — VALSARTAN 40 MG/1
40 TABLET, COATED ORAL
Qty: 30 | Refills: 0 | Status: DISCONTINUED | COMMUNITY
Start: 2022-07-26

## 2022-09-09 RX ORDER — DORZOLAMIDE HYDROCHLORIDE TIMOLOL MALEATE 20; 5 MG/ML; MG/ML
22.3-6.8 SOLUTION/ DROPS OPHTHALMIC
Qty: 10 | Refills: 0 | Status: ACTIVE | COMMUNITY
Start: 2022-03-24

## 2022-09-09 RX ORDER — PREDNISONE 10 MG/1
10 TABLET ORAL
Qty: 30 | Refills: 0 | Status: DISCONTINUED | COMMUNITY
Start: 2021-11-22 | End: 2022-09-09

## 2022-09-09 RX ORDER — DILTIAZEM HYDROCHLORIDE 120 MG/1
120 CAPSULE, EXTENDED RELEASE ORAL
Qty: 90 | Refills: 0 | Status: COMPLETED | COMMUNITY
Start: 2021-11-16

## 2022-09-09 RX ORDER — VALSARTAN 80 MG/1
80 TABLET, COATED ORAL DAILY
Qty: 90 | Refills: 0 | Status: DISCONTINUED | COMMUNITY
Start: 2022-05-18 | End: 2022-09-09

## 2022-09-09 RX ORDER — BRIMONIDINE TARTRATE 1.5 MG/ML
0.15 SOLUTION/ DROPS OPHTHALMIC
Qty: 10 | Refills: 0 | Status: ACTIVE | COMMUNITY
Start: 2022-07-26

## 2022-09-26 ENCOUNTER — APPOINTMENT (OUTPATIENT)
Dept: UROLOGY | Facility: CLINIC | Age: 87
End: 2022-09-26

## 2022-09-26 VITALS
HEIGHT: 62 IN | WEIGHT: 144 LBS | DIASTOLIC BLOOD PRESSURE: 80 MMHG | SYSTOLIC BLOOD PRESSURE: 156 MMHG | TEMPERATURE: 98 F | HEART RATE: 82 BPM | BODY MASS INDEX: 26.5 KG/M2

## 2022-09-26 PROCEDURE — 99213 OFFICE O/P EST LOW 20 MIN: CPT

## 2022-09-26 NOTE — PHYSICAL EXAM
[General Appearance - Well Nourished] : well nourished [Normal Appearance] : normal appearance [General Appearance - In No Acute Distress] : no acute distress [Abdomen Soft] : soft [Abdomen Tenderness] : non-tender [Costovertebral Angle Tenderness] : no ~M costovertebral angle tenderness [Oriented To Time, Place, And Person] : oriented to person, place, and time [Affect] : the affect was normal [FreeTextEntry1] : Looks younger than stated age.

## 2022-09-26 NOTE — HISTORY OF PRESENT ILLNESS
[FreeTextEntry1] : 89 y/o female with hx of pelvic fracture comes in for an evaluation of UTIs and question of hematuria. I've reviewed her films and there is a question of whether she has vesicle/vaginal fistula. When she had a CT cystogram performed, she had a pelvic hematoma, but is currently doing fairly well and is voiding without any difficulty. She had a UTI when she was discharged from the hospital with a catheter and another one earlier in September. She denies any urinary leakage and does not drink much fluids. Her bowels are constipated and somewhat hard. She moves her bowels every 2 days or so. Pt was seen accompanied by her daughter who interpreted for her.  \par \par pmhx: CAD, CHF, HTN.

## 2022-09-26 NOTE — ASSESSMENT
[FreeTextEntry1] : 89 y/o female with a hx of several UTIs, pelvic fracture, and possible fistula. We discussed these issues in detail and she does not currently meet the criteria for microhematuria with only 2 RBCs. Pt's UA was essentially clear today. She has not been having leakage and thus it's doubtful that there is much fistula present. I've asked her to increase her fluid intake and to get stool softener. She understands that she needs to work on her bowels so that it is soft and moves daily. We've also discussed measures such as wiping with moist wipes. If pt has any more UTIs we will consider futher work up with a renal and bladder US and cystoscopy. All her questions were otherwise answered. A total time of 30 mins were spent with the pt. \par  \par The submitted E/M billing level for this visit reflects the total time spent on the day of the visit including face-to-face time spent with the patient, non-face-to-face review of medical records and relevant information, documentation, and asynchronous communication with the patient after a visit via phone, email, or patient’s EHR portal after the visit. \par The medical records reviewed are either scanned into the chart or reviewed with the patient using a patient’s electronic medical records portal for patients with records not available to [facility] via electronic transmission platforms from other institutions and labs. \par Time spend counseling and performing coordination of care was also included in determining the appropriate EM billing level.\par \par I have reviewed and verified information regarding the chief complaint and history recorded by the ancillary staff and/or the patient. I have independently reviewed and interpreted tests performed by other physicians and facilities as necessary. \par \par I have discussed with the patient differential diagnosis, reason for auxiliary tests if ordered, risks, benefits, alternatives, and complications of each form of therapy were discussed.\par \par \par

## 2022-09-27 LAB
BILIRUB UR QL STRIP: NORMAL
COLLECTION METHOD: NORMAL
GLUCOSE UR-MCNC: NORMAL
HCG UR QL: 0.2 EU/DL
HGB UR QL STRIP.AUTO: NORMAL
KETONES UR-MCNC: NORMAL
LEUKOCYTE ESTERASE UR QL STRIP: NORMAL
NITRITE UR QL STRIP: NORMAL
PH UR STRIP: 6
PROT UR STRIP-MCNC: NORMAL
SP GR UR STRIP: 1.01

## 2022-09-30 ENCOUNTER — APPOINTMENT (OUTPATIENT)
Dept: ORTHOPEDIC SURGERY | Facility: CLINIC | Age: 87
End: 2022-09-30

## 2022-09-30 VITALS — HEIGHT: 62 IN | BODY MASS INDEX: 26.5 KG/M2 | WEIGHT: 144 LBS

## 2022-09-30 PROCEDURE — 99203 OFFICE O/P NEW LOW 30 MIN: CPT

## 2022-09-30 PROCEDURE — 73502 X-RAY EXAM HIP UNI 2-3 VIEWS: CPT | Mod: RT

## 2022-09-30 NOTE — REASON FOR VISIT
[FreeTextEntry2] : Follow-up right superior and inferior rami fractures; new complaint right lateral hip pain

## 2022-09-30 NOTE — IMAGING
[de-identified] :   89-year-old woman sits reasonably comfortably in my office.  She is able to stand with with assistance.\par \par Physical examination:\par Pelvis:  Tenderness palpation over the right greater trochanteric bursa.  She has minimal tenderness over her groin.  I am able rotate her hip joint without undue discomfort.  No low back or sacroiliac tenderness.  No lymph nodes inguinal crease.  No undue swelling in the lower extremity and calves are soft without cords.\par \par Radiographs:\par Right hip (AP, lateral):  Healing minimally displaced right superior and inferior rami fractures.  No evidence of femoral neck fracture.  There is callus formation noted around rami fractures.\par \par Reviewed CT scan [facility] right pelvis Giovanna 15, 2022 demonstrates above-noted right superior and inferior rami fractures.

## 2022-09-30 NOTE — HISTORY OF PRESENT ILLNESS
[de-identified] :  89-year-old woman status post ground level fall resulting in a stable LC 1 pelvic fracture (right superior and inferior rami fractures) sustained on June 14, 2022 returns to this office for her 1st visit.  She has been ambulating for the most part without a walker around the house but utilizing walker outside of the house.  She is accompanied by her daughter.  She arrives in a wheelchair.  Patient is not particularly taking much in the way of pain.  She is not currently enrolled in a physical therapy program.  She was initially evaluated [facility].  She has complaints today of lateral hip pain mostly in less groin pain.

## 2022-09-30 NOTE — ASSESSMENT
[FreeTextEntry1] :   89-year-old woman status post ground level fall 3 months ago resulting in a stable pelvic fracture of her right superior and inferior rami.  Radiographs and physical examination suggest these are healing uneventfully.  She now has complaints of pain on the lateral aspect of her hip which are consistent with right greater trochanteric bursitis.  My recommendation would be outpatient physical therapy.  Outpatient physical therapy will work on hip strengthening core strengthening gait training balance and generalized conditioning.  Modalities could be utilizes adjunct therapy.  If she has discomfort I would recommend Tylenol.  We can also give her prescription for topical diclofenac which she can apply to the area 2 to 3 times a day as needed.  NSAID precautions discussed.  I have her follow up in my office in 3 months time for repeat evaluation radiographs of her pelvis which would include AP inlet and outlet pelvic radiographs.  If she has any problems I am happy to see her back sooner.  All questions were answered to her and her daughter's satisfaction.

## 2023-01-13 ENCOUNTER — APPOINTMENT (OUTPATIENT)
Dept: CARDIOLOGY | Facility: CLINIC | Age: 88
End: 2023-01-13

## 2023-02-22 ENCOUNTER — APPOINTMENT (OUTPATIENT)
Dept: CARDIOLOGY | Facility: CLINIC | Age: 88
End: 2023-02-22
Payer: MEDICARE

## 2023-02-22 VITALS
OXYGEN SATURATION: 96 % | WEIGHT: 144 LBS | BODY MASS INDEX: 26.5 KG/M2 | HEIGHT: 62 IN | DIASTOLIC BLOOD PRESSURE: 88 MMHG | RESPIRATION RATE: 15 BRPM | TEMPERATURE: 97.8 F | SYSTOLIC BLOOD PRESSURE: 124 MMHG | HEART RATE: 86 BPM

## 2023-02-22 DIAGNOSIS — I25.10 ATHEROSCLEROTIC HEART DISEASE OF NATIVE CORONARY ARTERY W/OUT ANGINA PECTORIS: ICD-10-CM

## 2023-02-22 DIAGNOSIS — Z78.9 OTHER SPECIFIED HEALTH STATUS: ICD-10-CM

## 2023-02-22 PROCEDURE — 93000 ELECTROCARDIOGRAM COMPLETE: CPT

## 2023-02-22 PROCEDURE — 99214 OFFICE O/P EST MOD 30 MIN: CPT

## 2023-02-22 RX ORDER — FUROSEMIDE 20 MG/1
20 TABLET ORAL
Qty: 30 | Refills: 3 | Status: DISCONTINUED | COMMUNITY
Start: 2022-05-18 | End: 2023-02-22

## 2023-02-22 RX ORDER — DICLOFENAC SODIUM 20 MG/G
2 SOLUTION TOPICAL
Qty: 1 | Refills: 1 | Status: DISCONTINUED | COMMUNITY
Start: 2022-09-30 | End: 2023-02-22

## 2023-02-22 RX ORDER — CIPROFLOXACIN HYDROCHLORIDE 250 MG/1
250 TABLET, FILM COATED ORAL
Refills: 0 | Status: DISCONTINUED | COMMUNITY
End: 2023-02-22

## 2023-02-22 RX ORDER — LATANOPROST/PF 0.005 %
0.01 DROPS OPHTHALMIC (EYE)
Qty: 8 | Refills: 0 | Status: DISCONTINUED | COMMUNITY
Start: 2022-07-26 | End: 2023-02-22

## 2023-02-22 NOTE — CARDIOLOGY SUMMARY
[de-identified] : 2/22/23: normal sinus rhythm, 1st degree AVB [de-identified] : 6/11/22:\par 1. Normal LV size and function. G1DD.\par 2. RWMA. \par 3. Normal RV size and function.\par 4. Aortic root, 4.0 cm. Ascending aorta, 4.3 cm. \par

## 2023-02-22 NOTE — ASSESSMENT
[FreeTextEntry1] : 89 year old woman with HTN, HLD and ascending aorta dilatation who presents for follow up today. \par \par 1. Ascending aorta aneurysm: 4.6 cm on recent CT. Last echocardiogram in June 2022. Will repeat in June 2023.\par - Continue metoprolol\par \par 2. HTN: BP at goal today. She is at goal at home as well. Her daughter checks her BP diligently. \par \par 3. HLD/Coronary calcifications on CT: Continue atorvastatin 10mg daily; LDL is at goal of <100\par \par 4. Leg edema: Advised compression stockings, leg elevation\par \par RTC in 4 months.

## 2023-02-22 NOTE — HISTORY OF PRESENT ILLNESS
[FreeTextEntry1] : KIN HERNANDEZ is a 89 year old woman with HTN, HLD and ascending aorta dilatation who presents for follow up today. She last saw Dr. Puente. She is Polish speaking and is here with her daughter. \par \par The patient denies chest pain, shortness of breath at rest, palpitations and syncope. She has stable shortness of breath whilst climbing stairs. She has stable leg swelling with some pain in her LLE over a patch of dry skin. No erythema. No orthopnea and PND.\par \par For her thoracic aortic aneurysm, most recent CT scan from [facility] shows linear dimension of 4.6 cm. She is on metoprolol. \par \par For her HTN, her BP is well controlled at home off anti-hypertensives. \par

## 2023-04-20 ENCOUNTER — APPOINTMENT (OUTPATIENT)
Dept: ORTHOPEDIC SURGERY | Facility: CLINIC | Age: 88
End: 2023-04-20
Payer: MEDICARE

## 2023-04-20 PROCEDURE — 73503 X-RAY EXAM HIP UNI 4/> VIEWS: CPT | Mod: RT

## 2023-04-20 PROCEDURE — 99213 OFFICE O/P EST LOW 20 MIN: CPT

## 2023-04-20 NOTE — IMAGING
[de-identified] :   Colleen older woman sits comfortably in my office in a wheelchair.  While away the time she has been singing.  She is accompanied by her daughter in the exam room.\par \par Physical examination:\par Pelvis:  Patient is able to stand with minimal assistance and walk with minimal assistance.  She is an unsteady gait.  She has no tenderness palpation in her inguinal crease over her superior ramus.  No discrete lymph nodes or masses.  No tenderness along the posterior aspect of her sacrum and sacroiliac joint.  She still has some tenderness over the greater trochanteric bursa.  No pain with rotation of her hip joint.  She can axial load her hip joint without discomfort.\par \par Radiographs:\par AP pelvis:  Right superior and inferior rami fractures.  Interval healing is noted with callus.  No significant change in alignment.  Leg lengths appeared to be relatively preserved.

## 2023-04-20 NOTE — ASSESSMENT
[FreeTextEntry1] :   89-year-old woman healing pelvic fractures.  I think she has some trochanteric bursitis which might responded some physical therapy.  She also has about a month steady gait which also benefit from therapy.  Physical therapy with us focus on hip strengthening, core strengthening and generalized conditioning.  Physical therapy will focus on hip abductor strengthening.  Physical therapy should also work on balance and gait training.  Physical therapy should also work on stretching particularly her iliotibial band.  Physical therapy can work to teach the patient and her daughter some exercises that they can do on her own.  Modalities such as moist heat cryotherapy E stim etc can be utilized adjunct therapy. \par \par  In addition to physical therapy I am going to give her prescription for 2% diclofenac cream which she can apply to sore area twice daily.  We talked about a mattress Topper which may also mitigate the symptoms.  Will have her check in with us in 6 months to make sure she is doing okay.  She will not need new x-rays.  If she still having symptoms at that time we can consider cortisone injection.  This plan was reviewed with the patient's daughter who agrees with the plan.  All questions were answered to her satisfaction.\par \par   Addendum:  Patient also also notes that she has had some thoracic/thoracolumbar back pain sitting in a particular chair.  She does not have pain at rest.  Is not associated with any shortness of breath and she has recently had a chest x-ray which was normal.  No unexplained weight loss fevers night sweats or other constitutional symptoms.  Based on this history it does not sounds anything untoward.  If she does develop worsening symptoms of pain I would like her to come back to the office sooner.  Will bring her back in 3 months just in case.  She still complained back pain at that time we can attain radiographs of her thoracic and lumbar spine.  Patient's daughter agrees with this plan.

## 2023-04-20 NOTE — HISTORY OF PRESENT ILLNESS
[de-identified] :  89-year-old woman returns for interval follow-up LC 1 right pelvic fracture (right superior and inferior rami fractures with small sacral buckle fracture) sustained on June 14, 2022.  Prior visits we also diagnosed her with some trochanteric bursitis.  She has a pension for sleeping on her right side.  She returns today accompanied by her daughter.  She has some senile dementia but is reasonably happy.  Her daughter is helping her treat her discomfort with some topical Voltaren cream which is somewhat helpful.  She is walker dependent ambulator.  She is not currently enrolled in physical therapy.  She denies any groin pain or low back pain.

## 2023-05-16 ENCOUNTER — APPOINTMENT (OUTPATIENT)
Dept: ORTHOPEDIC SURGERY | Facility: CLINIC | Age: 88
End: 2023-05-16
Payer: MEDICARE

## 2023-05-16 PROCEDURE — 99212 OFFICE O/P EST SF 10 MIN: CPT

## 2023-05-16 NOTE — HISTORY OF PRESENT ILLNESS
[de-identified] :  89-year-old woman returns interval follow-up LC 1 right pelvic fracture with new diagnosis of recent trochanteric bursitis.  She sustained a pelvic fracture on June 14, 2022.  The bursitic symptoms of pain for about 2 months.  I last saw her in April and she was given a prescription for topical diclofenac but her insurance would cover the concentration prescribed.  She wonders what other medications can be provided short of an injection.  She is interested in other cream.  She is accompanied by her daughter.

## 2023-05-16 NOTE — IMAGING
[de-identified] : Pleasant older woman sits comfortably in a wheelchair in no distress.\par \par Physical examination:\par Right hip:  Minimal tenderness palpation trochanteric bursa.  No inguinal crease tenderness.  No lymph nodes inguinal crease.  Calf soft no cords.  Gait nonantalgic.  Mild Trendelenburg lurch versus unsteadiness in general.

## 2023-05-16 NOTE — ASSESSMENT
[FreeTextEntry1] :   89-year-old woman now containing with right trochanteric bursitis.  As an alternative to topical cream would recommend meloxicam once a day.  NSAID precautions were discussed.  She finds this helpful the patient can get further refills from her primary care doctor also can monitor blood pressure.  She should continue with the physical therapy.  Will see her in her scheduled appointment in 3 months to see if this improves her symptoms.  If not we can consider cortisone injection.  All questions answered to her and her daughter's satisfaction and they agree with the plan.

## 2023-06-05 ENCOUNTER — OUTPATIENT (OUTPATIENT)
Dept: OUTPATIENT SERVICES | Facility: HOSPITAL | Age: 88
LOS: 1 days | End: 2023-06-05
Payer: MEDICARE

## 2023-06-05 ENCOUNTER — APPOINTMENT (OUTPATIENT)
Dept: ULTRASOUND IMAGING | Facility: HOSPITAL | Age: 88
End: 2023-06-05
Payer: MEDICARE

## 2023-06-05 DIAGNOSIS — Z00.8 ENCOUNTER FOR OTHER GENERAL EXAMINATION: ICD-10-CM

## 2023-06-05 DIAGNOSIS — M79.606 PAIN IN LEG, UNSPECIFIED: ICD-10-CM

## 2023-06-05 PROCEDURE — 93970 EXTREMITY STUDY: CPT

## 2023-06-05 PROCEDURE — 93970 EXTREMITY STUDY: CPT | Mod: 26

## 2023-06-06 DIAGNOSIS — M79.606 PAIN IN LEG, UNSPECIFIED: ICD-10-CM

## 2023-06-14 ENCOUNTER — APPOINTMENT (OUTPATIENT)
Dept: CARDIOLOGY | Facility: CLINIC | Age: 88
End: 2023-06-14
Payer: MEDICARE

## 2023-06-14 PROCEDURE — 93306 TTE W/DOPPLER COMPLETE: CPT

## 2023-06-15 ENCOUNTER — APPOINTMENT (OUTPATIENT)
Dept: ORTHOPEDIC SURGERY | Facility: CLINIC | Age: 88
End: 2023-06-15
Payer: MEDICARE

## 2023-06-15 DIAGNOSIS — S32.82XD MULTIPLE FRACTURES OF PELVIS W/OUT DISRUPTION OF PELVIC RING, SUBSEQUENT ENCOUNTER FOR FRACTURE WITH ROUTINE HEALING: ICD-10-CM

## 2023-06-15 PROCEDURE — 99213 OFFICE O/P EST LOW 20 MIN: CPT

## 2023-06-15 NOTE — IMAGING
[de-identified] :   Recalcitrant older woman that very happy to be examined today.  She comfortably sits in her wheelchair but will get her up on the exam table really does not like to be examined.  Does not seem to have much pain to palpation over the trochanteric bursa or about her groin.  No pain with rotation of her hip joint.  No lymph nodes inguinal crease.  She is able walk with a somewhat unsteady gait with a some assistance.

## 2023-06-15 NOTE — ASSESSMENT
[FreeTextEntry1] :   89-year-old woman healed right LC 1 pelvic fracture by history has some trochanteric bursitis.  I suspect that she just has generalized deconditioning.  Will get her set up for some home-based physical therapy.  Home-based physical therapy is going to focus on core strengthening gait training hip strengthening balance and generalized conditioning.  I would transition instead of the meloxicam back to the Tylenol which is probably going to be as efficacious with a lower risk profile.  Will have her check in with us in 6 months just to make sure she is doing okay.  She will only obtain repeat x-rays if she is having pain or problems.

## 2023-06-15 NOTE — HISTORY OF PRESENT ILLNESS
[de-identified] : 89-year-old woman returns for interval follow-up LC 1 right pelvic fracture with new complaint diagnosis of right trochanteric bursitis.  She is accompanied by her daughter in the exam room.  Her daughter reports that she is walking with a walker without pain.  She really only has pain when she sits or lies on the affected right side.  She tried the meloxicam which did not significantly help her pain.  She is not currently enrolled in physical therapy.  The pain is localized to the trochanteric bursa without significant radiation.  No new trauma.  No sensory complaints.  No groin pain.

## 2023-06-21 ENCOUNTER — TRANSCRIPTION ENCOUNTER (OUTPATIENT)
Age: 88
End: 2023-06-21

## 2023-06-21 ENCOUNTER — EMERGENCY (EMERGENCY)
Facility: HOSPITAL | Age: 88
LOS: 0 days | Discharge: HOME | End: 2023-06-21
Attending: EMERGENCY MEDICINE
Payer: MEDICARE

## 2023-06-21 ENCOUNTER — APPOINTMENT (OUTPATIENT)
Dept: CARDIOLOGY | Facility: CLINIC | Age: 88
End: 2023-06-21
Payer: MEDICARE

## 2023-06-21 VITALS
OXYGEN SATURATION: 97 % | TEMPERATURE: 98 F | HEART RATE: 80 BPM | SYSTOLIC BLOOD PRESSURE: 179 MMHG | DIASTOLIC BLOOD PRESSURE: 80 MMHG | RESPIRATION RATE: 18 BRPM

## 2023-06-21 VITALS
RESPIRATION RATE: 14 BRPM | SYSTOLIC BLOOD PRESSURE: 139 MMHG | OXYGEN SATURATION: 95 % | TEMPERATURE: 96.8 F | WEIGHT: 143 LBS | DIASTOLIC BLOOD PRESSURE: 72 MMHG | HEIGHT: 62 IN | HEART RATE: 73 BPM | BODY MASS INDEX: 26.31 KG/M2

## 2023-06-21 VITALS
RESPIRATION RATE: 16 BRPM | DIASTOLIC BLOOD PRESSURE: 75 MMHG | TEMPERATURE: 100 F | WEIGHT: 139.99 LBS | OXYGEN SATURATION: 95 % | SYSTOLIC BLOOD PRESSURE: 142 MMHG | HEIGHT: 62 IN | HEART RATE: 68 BPM

## 2023-06-21 DIAGNOSIS — E78.5 HYPERLIPIDEMIA, UNSPECIFIED: ICD-10-CM

## 2023-06-21 DIAGNOSIS — F03.90 UNSPECIFIED DEMENTIA WITHOUT BEHAVIORAL DISTURBANCE: ICD-10-CM

## 2023-06-21 DIAGNOSIS — I10 ESSENTIAL (PRIMARY) HYPERTENSION: ICD-10-CM

## 2023-06-21 DIAGNOSIS — R45.6 VIOLENT BEHAVIOR: ICD-10-CM

## 2023-06-21 DIAGNOSIS — I71.43 INFRARENAL ABDOMINAL AORTIC ANEURYSM, WITHOUT RUPTURE: ICD-10-CM

## 2023-06-21 DIAGNOSIS — I25.10 ATHEROSCLEROTIC HEART DISEASE OF NATIVE CORONARY ARTERY WITHOUT ANGINA PECTORIS: ICD-10-CM

## 2023-06-21 DIAGNOSIS — M25.551 PAIN IN RIGHT HIP: ICD-10-CM

## 2023-06-21 DIAGNOSIS — Z86.79 PERSONAL HISTORY OF OTHER DISEASES OF THE CIRCULATORY SYSTEM: ICD-10-CM

## 2023-06-21 LAB
ALBUMIN SERPL ELPH-MCNC: 4.1 G/DL — SIGNIFICANT CHANGE UP (ref 3.5–5.2)
ALP SERPL-CCNC: 76 U/L — SIGNIFICANT CHANGE UP (ref 30–115)
ALT FLD-CCNC: 17 U/L — SIGNIFICANT CHANGE UP (ref 0–41)
ANION GAP SERPL CALC-SCNC: 14 MMOL/L — SIGNIFICANT CHANGE UP (ref 7–14)
AST SERPL-CCNC: 24 U/L — SIGNIFICANT CHANGE UP (ref 0–41)
BASOPHILS # BLD AUTO: 0.07 K/UL — SIGNIFICANT CHANGE UP (ref 0–0.2)
BASOPHILS NFR BLD AUTO: 0.8 % — SIGNIFICANT CHANGE UP (ref 0–1)
BILIRUB SERPL-MCNC: 0.7 MG/DL — SIGNIFICANT CHANGE UP (ref 0.2–1.2)
BUN SERPL-MCNC: 17 MG/DL — SIGNIFICANT CHANGE UP (ref 10–20)
CALCIUM SERPL-MCNC: 9.8 MG/DL — SIGNIFICANT CHANGE UP (ref 8.4–10.5)
CHLORIDE SERPL-SCNC: 105 MMOL/L — SIGNIFICANT CHANGE UP (ref 98–110)
CO2 SERPL-SCNC: 23 MMOL/L — SIGNIFICANT CHANGE UP (ref 17–32)
CREAT SERPL-MCNC: 0.9 MG/DL — SIGNIFICANT CHANGE UP (ref 0.7–1.5)
EGFR: 60 ML/MIN/1.73M2 — SIGNIFICANT CHANGE UP
EOSINOPHIL # BLD AUTO: 0.11 K/UL — SIGNIFICANT CHANGE UP (ref 0–0.7)
EOSINOPHIL NFR BLD AUTO: 1.3 % — SIGNIFICANT CHANGE UP (ref 0–8)
GLUCOSE SERPL-MCNC: 133 MG/DL — HIGH (ref 70–99)
HCT VFR BLD CALC: 36.5 % — LOW (ref 37–47)
HGB BLD-MCNC: 11.9 G/DL — LOW (ref 12–16)
IMM GRANULOCYTES NFR BLD AUTO: 0.6 % — HIGH (ref 0.1–0.3)
LYMPHOCYTES # BLD AUTO: 1.24 K/UL — SIGNIFICANT CHANGE UP (ref 1.2–3.4)
LYMPHOCYTES # BLD AUTO: 14.3 % — LOW (ref 20.5–51.1)
MCHC RBC-ENTMCNC: 30.7 PG — SIGNIFICANT CHANGE UP (ref 27–31)
MCHC RBC-ENTMCNC: 32.6 G/DL — SIGNIFICANT CHANGE UP (ref 32–37)
MCV RBC AUTO: 94.3 FL — SIGNIFICANT CHANGE UP (ref 81–99)
MONOCYTES # BLD AUTO: 0.51 K/UL — SIGNIFICANT CHANGE UP (ref 0.1–0.6)
MONOCYTES NFR BLD AUTO: 5.9 % — SIGNIFICANT CHANGE UP (ref 1.7–9.3)
NEUTROPHILS # BLD AUTO: 6.72 K/UL — HIGH (ref 1.4–6.5)
NEUTROPHILS NFR BLD AUTO: 77.1 % — HIGH (ref 42.2–75.2)
NRBC # BLD: 0 /100 WBCS — SIGNIFICANT CHANGE UP (ref 0–0)
PLATELET # BLD AUTO: 290 K/UL — SIGNIFICANT CHANGE UP (ref 130–400)
PMV BLD: 9.6 FL — SIGNIFICANT CHANGE UP (ref 7.4–10.4)
POTASSIUM SERPL-MCNC: 5 MMOL/L — SIGNIFICANT CHANGE UP (ref 3.5–5)
POTASSIUM SERPL-SCNC: 5 MMOL/L — SIGNIFICANT CHANGE UP (ref 3.5–5)
PROT SERPL-MCNC: 6.5 G/DL — SIGNIFICANT CHANGE UP (ref 6–8)
RBC # BLD: 3.87 M/UL — LOW (ref 4.2–5.4)
RBC # FLD: 13.2 % — SIGNIFICANT CHANGE UP (ref 11.5–14.5)
SODIUM SERPL-SCNC: 142 MMOL/L — SIGNIFICANT CHANGE UP (ref 135–146)
WBC # BLD: 8.7 K/UL — SIGNIFICANT CHANGE UP (ref 4.8–10.8)
WBC # FLD AUTO: 8.7 K/UL — SIGNIFICANT CHANGE UP (ref 4.8–10.8)

## 2023-06-21 PROCEDURE — 80053 COMPREHEN METABOLIC PANEL: CPT

## 2023-06-21 PROCEDURE — 73552 X-RAY EXAM OF FEMUR 2/>: CPT | Mod: RT

## 2023-06-21 PROCEDURE — 99214 OFFICE O/P EST MOD 30 MIN: CPT

## 2023-06-21 PROCEDURE — 36415 COLL VENOUS BLD VENIPUNCTURE: CPT

## 2023-06-21 PROCEDURE — 96374 THER/PROPH/DIAG INJ IV PUSH: CPT

## 2023-06-21 PROCEDURE — 72170 X-RAY EXAM OF PELVIS: CPT

## 2023-06-21 PROCEDURE — 72192 CT PELVIS W/O DYE: CPT | Mod: 26,MA

## 2023-06-21 PROCEDURE — 99285 EMERGENCY DEPT VISIT HI MDM: CPT | Mod: 25

## 2023-06-21 PROCEDURE — 73552 X-RAY EXAM OF FEMUR 2/>: CPT | Mod: 26,RT,76

## 2023-06-21 PROCEDURE — 72170 X-RAY EXAM OF PELVIS: CPT | Mod: 26

## 2023-06-21 PROCEDURE — 85025 COMPLETE CBC W/AUTO DIFF WBC: CPT

## 2023-06-21 PROCEDURE — 72192 CT PELVIS W/O DYE: CPT | Mod: MA

## 2023-06-21 PROCEDURE — 93000 ELECTROCARDIOGRAM COMPLETE: CPT

## 2023-06-21 PROCEDURE — 96372 THER/PROPH/DIAG INJ SC/IM: CPT | Mod: XU

## 2023-06-21 PROCEDURE — 99285 EMERGENCY DEPT VISIT HI MDM: CPT | Mod: FS

## 2023-06-21 RX ORDER — MORPHINE SULFATE 50 MG/1
2 CAPSULE, EXTENDED RELEASE ORAL ONCE
Refills: 0 | Status: DISCONTINUED | OUTPATIENT
Start: 2023-06-21 | End: 2023-06-21

## 2023-06-21 RX ORDER — HALOPERIDOL DECANOATE 100 MG/ML
2.5 INJECTION INTRAMUSCULAR ONCE
Refills: 0 | Status: COMPLETED | OUTPATIENT
Start: 2023-06-21 | End: 2023-06-21

## 2023-06-21 RX ADMIN — MORPHINE SULFATE 2 MILLIGRAM(S): 50 CAPSULE, EXTENDED RELEASE ORAL at 12:25

## 2023-06-21 RX ADMIN — HALOPERIDOL DECANOATE 2.5 MILLIGRAM(S): 100 INJECTION INTRAMUSCULAR at 12:57

## 2023-06-21 NOTE — ED PROVIDER NOTE - PATIENT PORTAL LINK FT
You can access the FollowMyHealth Patient Portal offered by [facility] by registering at the following website: [email or web address]. By joining [employer]’s FollowMyHealth portal, you will also be able to view your health information using other applications (apps) compatible with our system.

## 2023-06-21 NOTE — ED PROVIDER NOTE - PROVIDER TOKENS
PROVIDER:[TOKEN:[[id number]],FOLLOWUP:[Routine]],PROVIDER:[TOKEN:[[id number]],FOLLOWUP:[Routine]]

## 2023-06-21 NOTE — HISTORY OF PRESENT ILLNESS
[FreeTextEntry1] : KIN HERNANDEZ is a 89 year old woman with HTN, HLD and ascending aorta dilatation who presents for follow up today. \par \par The patient denies chest pain, shortness of breath at rest, palpitations and syncope. She has stable shortness of breath whilst climbing stairs. She has stable leg swelling. Her RLE is erythemetous and warm today. No orthopnea and PND. She is suffering with excruciating back pain and has not slept for three days. She is fatigued. Meloxicam and Tramadol do not help. \par \par For her thoracic aortic aneurysm, most recent CT scan from [facility] shows linear dimension of 4.6 cm. She is on metoprolol. \par \par For her HTN, her BP is well controlled at home off anti-hypertensives. \par

## 2023-06-21 NOTE — ED PROVIDER NOTE - OBJECTIVE STATEMENT
89-year-old female with  past medical history dementia, hypertension, HLD, cardiac arrhythmia and CAD presents with right hip pain. Per patient's daughter patient has been having right hip pain for several months, worsening since March 2023. Patient's daughter states patient has visited her orthopedist multiple times for complaint of right hip pain (last visit was last week), however on evaluation patient has stated she does not have pain. Patient's daughter states patient was unable to sleep due to pain in right hip for past 2 nights. Patient and her daughter deny any trauma and any other complaints. Patient able to ambulate for a few steps with walker, with assistance at baseline.

## 2023-06-21 NOTE — ED ADULT TRIAGE NOTE - NS ED NURSE BANDS TYPE
Internal Medicine Admitting History and Physical    Note Author: Janet Napoles M.D.       Name Brijesh Brown 1944   Age/Sex 74 y.o. male   MRN [id number]   Code Status Full     After 5PM or if no immediate response to page, please call for cross-coverage  Attending/Team: Dr. Montana/ RHONDA Ac See Patient List for primary contact information  Call [phone] to page    1st Call - Day Intern (R1):   Dr. Napoles 2nd Call - Day Sr. Resident (R2/R3):   Dr. Leal       Chief Complaint:   Chest pain    HPI:  This is a 74 years old male with past medical history of coronary artery disease with prior LAD stenting x 3 in , diabetes mellitus type 2, lipidemia, hypertension, peripheral vascular disease (no current claudication) was sent from cardiology office this morning.     The patient is having exertional chest pain for about a year.  About a year ago he could walk about 75 years in started having chest pain which would relieve upon stopping walking for a few minutes.  Pain was pressure-like in the front chest, no radiation at that point.  The pain slowly became progressive now he can walk only 30-40 yards without getting pain.  He visited cardiology clinic 2 weeks ago () for these worsening symptoms.  Cardiac PET scan was done on .  He was seen in the cardiology office today for the stress test result which was positive with moderate ischemia in the inferior region.  The patient stated that last night he had chest pain on rest, pain radiated to the left-sided neck/right-sided upper back below the shoulder blades. He also had nausea which is new.  During the clinic visit in the morning he also had chest pain at rest. He was sent to ER for further management. In the ER his pain was minimal, more like chest discomfort. Denies any palpitation/ current nausea/ vomiting/ SOB/ leg swelling/ irregular HR/ Dizziness. No fever/ chills/ Bowel bladder complains/ any other active complains. He  does have cough for last 2/3 weeks, no productive sputum. Not current smoker, quit smoking in 1984. Family history positive for Father needing 3 vessel CABG in 50s. Normally doesn't take aspirin because he can't tolerate however since the cardiology visit 2 weeks ago, he was taking baby aspirin.     ER course: Patient was tachycardic in the 100s, blood pressure was elevated in 170s/90s.  EKG did not show any acute ST elevation or depression.  Troponin was 0.01.  BNP 4.  Other labs were grossly normal except glucose of 222. Dr. Galan was consulted from ER and due to the nature of his pain along with worsening of symptoms he was taken to Cath Lab for angiography from ER.     Review of Systems   Constitutional: Negative for chills, diaphoresis, fever, malaise/fatigue and weight loss.   Eyes: Positive for blurred vision (Chronic grey zone in looking down and out). Negative for double vision and photophobia.   Respiratory: Positive for cough (Dry cough x 2 weeks). Negative for hemoptysis, sputum production, shortness of breath and wheezing.    Cardiovascular: Positive for chest pain. Negative for palpitations, orthopnea, claudication and leg swelling.   Gastrointestinal: Positive for nausea. Negative for abdominal pain, blood in stool, constipation, diarrhea, heartburn and vomiting.   Genitourinary: Negative for dysuria, frequency and urgency.   Musculoskeletal: Negative for back pain, falls, joint pain, myalgias and neck pain.   Neurological: Negative for dizziness, tingling, tremors, sensory change, speech change, focal weakness, seizures and headaches.   Psychiatric/Behavioral: Negative for depression, substance abuse and suicidal ideas.             Past Medical History (Chronic medical problem, known complications and current treatment)    Essential Hypertension (controlled on no medications)  Diabetes Mellitus type 2 (managed with metformin, tresiba, glimepiride)  Dyslipidemia (not on any oral medications, has  "statin and PCKS9 inhibitors intolerance)  Peripheral vascular disease (last KWADWO 3/2018: Moderate arterial insufficiency, not on any medications)  Obesity  Frequent kidney stones (15 episodes so far, no abnormality found, normally passes by itself, no procedure required)  Obstructive sleep apnea (used to have CPAP, but did not use it now)    Past Surgical History:  Past Surgical History:   Procedure Laterality Date   • CARDIAC CATH         Current Outpatient Medications:  Home Medications     Reviewed by Leighton Barr (Pharmacy Tech) on 02/08/19 at 1304  Med List Status: Complete   Medication Last Dose Status   aspirin EC (ECOTRIN) 81 MG Tablet Delayed Response 2/8/2019 Active   glimepiride (AMARYL) 4 MG Tab 2/8/2019 Active   Insulin Degludec (TRESIBA FLEXTOUCH) 200 UNIT/ML Solution Pen-injector 2/7/2019 Active   metFORMIN (GLUCOPHAGE) 500 MG Tab 2/8/2019 Active   zolpidem (AMBIEN) 5 MG Tab PRN Active                Medication Allergy/Sensitivities:  Allergies   Allergen Reactions   • Statins [Hmg-Coa-R Inhibitors]      Caused muscle pain         Family History (mandatory)   Family History   Problem Relation Age of Onset   • Lung Disease Mother    • Heart Disease Father         \"bad valve\"   • Diabetes Sister    • Heart Disease Sister        Social History (mandatory)   Social History     Social History   • Marital status:      Spouse name: N/A   • Number of children: N/A   • Years of education: N/A     Occupational History   • Not on file.     Social History Main Topics   • Smoking status: Former Smoker     Packs/day: 1.00     Years: 20.00     Types: Cigarettes     Quit date: 1/1/1984   • Smokeless tobacco: Never Used      Comment: quit '84, 20 py history   • Alcohol use No   • Drug use: Unknown   • Sexual activity: Not on file      Comment:  48 years     Other Topics Concern   • Not on file     Social History Narrative   • No narrative on file     Living situation: Home  PCP : Sheridan Jackman, " M.D.    Physical Exam     Vitals:    19 1137 19 1142 19 1206 19 1406   BP:   157/79 (!) 168/90   Pulse:  89 82 86   Resp:  18 16 16   SpO2:  96% 96% 94%   Weight: 105.2 kg (231 lb 14.8 oz)        Body mass index is 34.75 kg/m².  BP (!) 168/90   Pulse 86   Resp 16   Wt 105.2 kg (231 lb 14.8 oz)   SpO2 94%   BMI 34.75 kg/m²   O2 therapy: Pulse Oximetry: 94 %, O2 Delivery: None (Room Air)    Physical Exam   Constitutional: He is oriented to person, place, and time. He appears distressed.   HENT:   Head: Normocephalic and atraumatic.   Thick neck   Eyes: Pupils are equal, round, and reactive to light. Conjunctivae and EOM are normal.   Neck: Normal range of motion. Neck supple. No JVD (Difficult to appreciate) present.   Cardiovascular: Normal rate, regular rhythm, normal heart sounds and intact distal pulses.    No murmur heard.  Decreased but palpable bilateral DP pulses   Pulmonary/Chest: Effort normal and breath sounds normal. No respiratory distress. He has no wheezes. He has no rales.   Abdominal: Soft. Bowel sounds are normal. He exhibits no distension. There is no tenderness. There is no rebound and no guarding.   Increased Girth   Musculoskeletal: Normal range of motion. He exhibits no edema or deformity.   Neurological: He is alert and oriented to person, place, and time.   Skin: Skin is warm. He is not diaphoretic. No erythema.   Psychiatric: Affect and judgment normal.         Data Review       Old Records Request:   Completed  Current Records review/summary: Completed    Lab Data Review:  Recent Results (from the past 24 hour(s))   EKG    Collection Time: 19 10:44 AM   Result Value Ref Range    Report       [facility] [place] [facility]    Test Date:  2019  Pt Name:    [facility]                  Department: [facility]  MRN:        [id number]                      Room:  Gender:     Male                         Technician: [id number]  :        1944                   Requested  By:PAMELA KOLB  Order #:    [id number]                    Reading MD:    Measurements  Intervals                                Axis  Rate:       88                           P:          59  DE:         172                          QRS:        19  QRSD:       90                           T:          67  QT:         380  QTc:        460    Interpretive Statements  SINUS RHYTHM  EARLY PRECORDIAL R/S TRANSITION  No previous ECG available for comparison     EKG    Collection Time: 19 11:46 AM   Result Value Ref Range    Report       [facility] Emergency Dept.    Test Date:  2019  Pt Name:    [facility]                  Department: ER  MRN:        [id number]                      Room:       [facility]  Gender:     Male                         Technician: [id number]  :        1944                   Requested By:ER TRIAGE PROTOCOL  Order #:    [id number]                    Reading MD: CHUY DE DIOS    Measurements  Intervals                                Axis  Rate:       84                           P:          32  DE:         168                          QRS:        -6  QRSD:       92                           T:          23  QT:         372  QTc:        440    Interpretive Statements  Normal sinus rhythm at a rate of 84.  No acute ST elevation or depression.  Normal intervals.  RSR noted.  No acute T wave changes.    Electronically Signed On 2019 13:42:57 PST by CHUY DE DIOS     Troponin    Collection Time: 19 11:53 AM   Result Value Ref Range    Troponin I <0.01 0.00 - 0.04 ng/mL   Btype Natriuretic Peptide    Collection Time: 19 11:53 AM   Result Value Ref Range    B Natriuretic Peptide 4 0 - 100 pg/mL   CBC with Differential    Collection Time: 19 11:53 AM   Result Value Ref Range    WBC 7.4 4.8 - 10.8 K/uL    RBC 4.48 (L) 4.70 - 6.10 M/uL    Hemoglobin 15.4 14.0 - 18.0 g/dL    Hematocrit 44.7 42.0 - 52.0 %    MCV 99.8 (H) 81.4 - 97.8 fL    MCH 34.4 (H)  27.0 - 33.0 pg    MCHC 34.5 33.7 - 35.3 g/dL    RDW 45.5 35.9 - 50.0 fL    Platelet Count 181 164 - 446 K/uL    MPV 11.2 9.0 - 12.9 fL    Neutrophils-Polys 58.30 44.00 - 72.00 %    Lymphocytes 28.10 22.00 - 41.00 %    Monocytes 10.40 0.00 - 13.40 %    Eosinophils 2.60 0.00 - 6.90 %    Basophils 0.30 0.00 - 1.80 %    Immature Granulocytes 0.30 0.00 - 0.90 %    Nucleated RBC 0.00 /100 WBC    Neutrophils (Absolute) 4.31 1.82 - 7.42 K/uL    Lymphs (Absolute) 2.08 1.00 - 4.80 K/uL    Monos (Absolute) 0.77 0.00 - 0.85 K/uL    Eos (Absolute) 0.19 0.00 - 0.51 K/uL    Baso (Absolute) 0.02 0.00 - 0.12 K/uL    Immature Granulocytes (abs) 0.02 0.00 - 0.11 K/uL    NRBC (Absolute) 0.00 K/uL   Complete Metabolic Panel (CMP)    Collection Time: 02/08/19 11:53 AM   Result Value Ref Range    Sodium 134 (L) 135 - 145 mmol/L    Potassium 4.2 3.6 - 5.5 mmol/L    Chloride 102 96 - 112 mmol/L    Co2 23 20 - 33 mmol/L    Anion Gap 9.0 0.0 - 11.9    Glucose 222 (H) 65 - 99 mg/dL    Bun 21 8 - 22 mg/dL    Creatinine 1.08 0.50 - 1.40 mg/dL    Calcium 9.3 8.5 - 10.5 mg/dL    AST(SGOT) 16 12 - 45 U/L    ALT(SGPT) 19 2 - 50 U/L    Alkaline Phosphatase 71 30 - 99 U/L    Total Bilirubin 0.4 0.1 - 1.5 mg/dL    Albumin 4.2 3.2 - 4.9 g/dL    Total Protein 8.0 6.0 - 8.2 g/dL    Globulin 3.8 (H) 1.9 - 3.5 g/dL    A-G Ratio 1.1 g/dL   Prothrombin Time    Collection Time: 02/08/19 11:53 AM   Result Value Ref Range    PT 12.9 12.0 - 14.6 sec    INR 0.96 0.87 - 1.13   APTT    Collection Time: 02/08/19 11:53 AM   Result Value Ref Range    APTT 27.6 24.7 - 36.0 sec   Lipase    Collection Time: 02/08/19 11:53 AM   Result Value Ref Range    Lipase 13 11 - 82 U/L   ESTIMATED GFR    Collection Time: 02/08/19 11:53 AM   Result Value Ref Range    GFR If African American >60 >60 mL/min/1.73 m 2    GFR If Non African American >60 >60 mL/min/1.73 m 2   Lipid Profile    Collection Time: 02/08/19 11:53 AM   Result Value Ref Range    Cholesterol,Tot 245 (H) 100 - 199  mg/dL    Triglycerides 121 0 - 149 mg/dL    HDL 42 >=40 mg/dL     (H) <100 mg/dL       Imaging/Procedures Review:    Independant Imaging Review: Completed  DX-CHEST-LIMITED (1 VIEW)   Final Result      Bilateral lung base atelectasis.           EKG:   EKG Independent Review: Completed  QTc:440, HR: 84, Normal Sinus Rhythm, no ST/T changes     Records reviewed and summarized in current documentation :  Yes  [employer] teaching service handout given to patient:  No         Assessment/Plan     * ACS (acute coronary syndrome) (HCC)   Assessment & Plan    74 years old male with past medical history of coronary artery disease (3 stents in LAD in 2004), type 2 diabetes mellitus (last HbA1c in 2015: 6.9), hypertension, dyslipidemia was admitted due to worsening chest pain which started with exertional, recently became chest pain/discomfort at rest.  No troponin elevation/EKG changes. On aspirin for last 2 weeks only after the cardilogy visit. He was not taking it because of intolerance.   - BETTY score for UA : 4 points ( 20% risk at 14 days of all call mortality, new or recurrent MI, or severe recurrent ischemia requiring urgent revascularization)  -Admission in telemetry  -Cardiology consulted from ER, Angiogram today. Directly taken from ER  -Follow-up post angiogram findings     Peripheral arterial disease (HCC)- (present on admission)   Assessment & Plan    Last KWADWO 3/2018: Moderate arterial insufficiency, not on any medications. No active claudication.  - Discuss about restarting Aspirin.      Statin intolerance- (present on admission)   Assessment & Plan    Has Myopathy with statins.       Benign essential HTN- (present on admission)   Assessment & Plan    Not on any home medications. Will continue to monitor.      Hyperlipidemia- (present on admission)   Assessment & Plan    Not on any oral medications, has statin and PCKS9 inhibitors intolerance. Per last cardiology note : consider starting Zetia.  Last lipid panel  from 3/2018 with LDL of 160, . reordered on admission.   F/U results.      CAD (coronary artery disease)- (present on admission)   Assessment & Plan    Prior LAD stenting x 3 in 2004  No h/o HF. Last Echo : EF 65%. Normal systolic function. Grade I diastolic dysfunction.   Having worsening exertional chest pain x 1 year, stress test 02/06/2019: positive with moderate ischemia in the inferior region.  Cardiac cath today     Type II diabetes mellitus (HCC)- (present on admission)   Assessment & Plan    At home controlled with Glimiperide 4 mg BID, Metformin 1000 BID, and Insulin Deglutec 26 U (Last A1c 10/2015: 7.4)  - Oral meds on hold. Continue Lantus 12 U + ISS  - Repeat HbA1C ordered.          Anticipated Hospital stay:  >2 midnights        Quality Measures  Quality-Core Measures   Reviewed items::  EKG reviewed, Labs reviewed, Medications reviewed and Radiology images reviewed  Phelps catheter::  No Phelps  DVT prophylaxis - mechanical:  SCDs    PCP: Sheridan Jackman M.D.   Name band;

## 2023-06-21 NOTE — ASSESSMENT
[FreeTextEntry1] : 89 year old woman with HTN, HLD and ascending aorta dilatation who presents for follow up today. \par \par 1. 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 \par - Continue metoprolol\par \par 2. HTN: BP at goal today. She is at goal at home as well. Her daughter checks her BP diligently. \par \par 3. HLD/Coronary calcifications on CT: Continue atorvastatin 10mg daily; LDL is at goal of <100\par \par 4. Leg edema: Advised compression stockings, leg elevation. She has some erythema on her RLE today. Asked her to show the ER. \par \par I advised her to go to the ER for her back pain. Her daughter will take her now. \par \par RTC in 6 months.

## 2023-06-21 NOTE — PHYSICAL EXAM
[Well Developed] : well developed [Well Nourished] : well nourished [No Acute Distress] : no acute distress [Normal Conjunctiva] : normal conjunctiva [Normal Venous Pressure] : normal venous pressure [No Carotid Bruit] : no carotid bruit [Normal S1, S2] : normal S1, S2 [No Murmur] : no murmur [No Rub] : no rub [No Gallop] : no gallop [Clear Lung Fields] : clear lung fields [Good Air Entry] : good air entry [No Respiratory Distress] : no respiratory distress  [Soft] : abdomen soft [Non Tender] : non-tender [No Masses/organomegaly] : no masses/organomegaly [Normal Bowel Sounds] : normal bowel sounds [Normal Gait] : normal gait [No Edema] : no edema [No Cyanosis] : no cyanosis [No Clubbing] : no clubbing [No Varicosities] : no varicosities [Edema ___] : edema [unfilled] [No Skin Lesions] : no skin lesions [Moves all extremities] : moves all extremities [No Focal Deficits] : no focal deficits [Normal Speech] : normal speech [Alert and Oriented] : alert and oriented [Normal memory] : normal memory [de-identified] : Erythema over RLE

## 2023-06-21 NOTE — ED PROVIDER NOTE - CARE PROVIDER_API CALL
Ruben Orozco  Orthopaedic Surgery  [address] [place] [address]  Phone: [phone]  Fax: [phone]  Follow Up Time: Routine    Robin Bass  Anesthesiology  [facility] [place] [address]  Phone: [phone]  Fax: [phone]  Follow Up Time: Routine

## 2023-06-21 NOTE — ED ADULT NURSE NOTE - NS ED PATIENT SAFETY CONCERN
No

## 2023-06-21 NOTE — ED PROVIDER NOTE - ATTENDING APP SHARED VISIT CONTRIBUTION OF CARE
89-year-old female with a past medical history significant for dementia, hypertension, hyperlipidemia cardiac arrhythmia and CAD who presents with right hip pain.  As per patient's daughter patient has been having right hip pain for many months starting in March.  Of note patient did have a right superior pubic rami fracture approximately a yr ago. Pt has had multiple visits to Dr. Orozco however, pt refuses completion of evaluation. As per pts daughter yesterday pt was unable to sleep due to pain. Pt and daughter deny any trauma. Pt denies any other medical complaints.     VITAL SIGNS: I have reviewed nursing notes and confirm.  CONSTITUTIONAL: non-toxic, well appearing  SKIN: no rash, no petechiae.  EYES:  EOMI, pink conjunctiva, anicteric  ENT: tongue midline, no exudates, MMM  NECK: Supple; no meningismus, no JVD  CARD: RRR, no murmurs, equal radial pulses bilaterally 2+  RESP: CTAB, no respiratory distress  ABD: Soft, non-tender, non-distended, no peritoneal signs, no HSM, no CVA tenderness  EXT: Normal ROM x4. No edema. No calves tenderness. no pain on palpation of the R hip, no swelling/crepitus/redness.     89 yr old f that presents with R hip pain. labs, imaging, pain management. of note, pt became upset and aggressive while attempting to obtain imaging. Spoke to daughter and will give haldol 2.5 in order to obtain imaging. will continue to monitor.

## 2023-06-21 NOTE — ED PROVIDER NOTE - NSFOLLOWUPINSTRUCTIONS_ED_ALL_ED_FT
Our Emergency Department Referral Coordinators will be reaching out to you in the next 24-48 hours from 9:00am to 5:00pm with a follow up appointment. Please expect a phone call from the hospital in that time frame. If you do not receive a call or if you have any questions or concerns, you can reach them at   [phone].        English    Hip Pain  The hip is the joint between the upper legs and the lower pelvis. The bones, cartilage, tendons, and muscles of your hip joint support your body and allow you to move around.    Hip pain can range from a minor ache to severe pain in one or both of your hips. The pain may be felt on the inside of the hip joint near the groin, or on the outside near the buttocks and upper thigh. You may also have swelling or stiffness in your hip area.    Follow these instructions at home:  Managing pain, stiffness, and swelling        If directed, put ice on the painful area. To do this:  Put ice in a plastic bag.  Place a towel between your skin and the bag.  Leave the ice on for 20 minutes, 2–3 times a day.  If directed, apply heat to the affected area as often as told by your health care provider. Use the heat source that your health care provider recommends, such as a moist heat pack or a heating pad.  Place a towel between your skin and the heat source.  Leave the heat on for 20–30 minutes.  Remove the heat if your skin turns bright red. This is especially important if you are unable to feel pain, heat, or cold. You may have a greater risk of getting burned.  Activity    Do exercises as told by your health care provider.  Avoid activities that cause pain.  General instructions      Take over-the-counter and prescription medicines only as told by your health care provider.  Keep a journal of your symptoms. Write down:  How often you have hip pain.  The location of your pain.  What the pain feels like.  What makes the pain worse.  Sleep with a pillow between your legs on your most comfortable side.  Keep all follow-up visits as told by your health care provider. This is important.  Contact a health care provider if:  You cannot put weight on your leg.  Your pain or swelling continues or gets worse after one week.  It gets harder to walk.  You have a fever.  Get help right away if:  You fall.  You have a sudden increase in pain and swelling in your hip.  Your hip is red or swollen or very tender to touch.  Summary  Hip pain can range from a minor ache to severe pain in one or both of your hips.  The pain may be felt on the inside of the hip joint near the groin, or on the outside near the buttocks and upper thigh.  Avoid activities that cause pain.  Write down how often you have hip pain, the location of the pain, what makes it worse, and what it feels like.  This information is not intended to replace advice given to you by your health care provider. Make sure you discuss any questions you have with your health care provider.

## 2023-06-21 NOTE — CARDIOLOGY SUMMARY
[de-identified] : 6/21/23: normal sinus rhythm [de-identified] : 6/16/23: Normal left and right ventricular size and systolic function. Midl MR, Mild PH, PASP 40mmHg. Aortic root 3.77, Proximal 4.3 cm.\par 6/11/22:\par 1. Normal LV size and function. G1DD.\par 2. RWMA. \par 3. Normal RV size and function.\par 4. Aortic root, 4.0 cm. Ascending aorta, 4.3 cm.

## 2023-06-21 NOTE — ED PROVIDER NOTE - PROGRESS NOTE DETAILS
ER: pt signed out to Dr. ZEYNEP Jain AY: Discussed CT findings with patient and daughter at bedside (increase in AAA size to 4.0 cm from 3.7 cm prior study) - patient's daughter states she wants to follow-up with vascular surgery as an outpatient. Explained risks of not seeing vascular surgery during present visit, including further expansion of AAA or AAA rupture. Patient and her daughter endorsed understanding. The patient was given detailed return precautions and advised to return to the emergency department if any new symptoms developed, symptoms worsened or for any concerns. The patient was offered the opportunity to ask questions and verbalized that they understand the diagnosis and discharge instructions.

## 2023-06-21 NOTE — ED PROVIDER NOTE - PHYSICAL EXAMINATION
Physical Exam    Vital Signs: I have reviewed the initial vital signs.  Constitutional: appears stated age, no acute distress  Eyes: Sclera clear, EOMI.  Cardiovascular: S1 and S2, regular rate, regular rhythm, well-perfused extremities, radial pulses equal and 2+  Respiratory: unlabored respiratory effort, clear to auscultation bilaterally   Musculoskeletal: supple neck, no lower extremity edema, no tenderness in right hip to palpation at skin over greater trochanter, no tenderness in right hip on log roll, passive flexion, internal rotation.  Integumentary: warm, dry, no rash  Neurologic: awake, alert, oriented x1 (person), extremities’ motor and sensory functions grossly intact

## 2023-06-21 NOTE — ED PROVIDER NOTE - NSPTACCESSSVCSAPPTDETAILS_ED_ALL_ED_FT
Pain Management - Patient with right hip pain  Vascular Surgery - patient with aortic aneurysm 4.0cm

## 2023-06-21 NOTE — REVIEW OF SYSTEMS
[Dyspnea on exertion] : dyspnea during exertion [Lower Ext Edema] : lower extremity edema [Joint Pain] : joint pain [Joint Swelling] : joint swelling [Rash] : rash [Negative] : Heme/Lymph

## 2023-06-23 ENCOUNTER — APPOINTMENT (OUTPATIENT)
Dept: PAIN MANAGEMENT | Facility: CLINIC | Age: 88
End: 2023-06-23
Payer: MEDICARE

## 2023-06-23 DIAGNOSIS — M50.20 OTHER CERVICAL DISC DISPLACEMENT, UNSPECIFIED CERVICAL REGION: ICD-10-CM

## 2023-06-23 PROCEDURE — 20610 DRAIN/INJ JOINT/BURSA W/O US: CPT | Mod: RT

## 2023-06-23 PROCEDURE — 77002 NEEDLE LOCALIZATION BY XRAY: CPT

## 2023-06-23 PROCEDURE — 99214 OFFICE O/P EST MOD 30 MIN: CPT | Mod: 25

## 2023-06-24 NOTE — DISCUSSION/SUMMARY
[de-identified] : A discussion regarding available pain management treatment options occurred with the patient.  These included interventional, rehabilitative, pharmacological, and alternative modalities. We will proceed with the following:\par \par Interventional treatment options:\par will proceed with a thoracic lumbar trigger point injection\par \par will proceed with a R GTB injection \par \par Rehabilitative options:\par - participation in active HEP was discussed\par \par Image:\par Ordered CT lumbar spine\par \par Ordered CT thoracic spine \par \par Complementary treatment options:\par - lifestyle modifications discussed\par \par follow up 2 weeks after injection \par \par I, Wendi Harper, attest that this documentation has been prepared under the direction and in the presence of Provider Allan Pena DO\par The documentation recorded by the scribe, in my presence, accurately reflects the service I personally performed, and the decisions made by me with my edits as appropriate.\par \par Best Regards, \par Allan Pena D.O.\par \par

## 2023-06-24 NOTE — PROCEDURE
[FreeTextEntry3] : PROCEDURE: a right-sided  trochanteric bursa injection under fluoroscopic\par guidance\par REASON FOR PROCEDURE: right-sided trochanteric bursitis\par PHYSICIAN: Allan Pena DO\par MEDICATIONS INJECTED: 10mg of dexamethasone mixed with 4 ccs of 0.25% bupivicaine\par LOCAL ANESTHETIC INJECTED: None\par SEDATION MEDICATIONS: None\par ESTIMATED BLOOD LOSS: None\par COMPLICATIONS: None\par TECHNIQUE: Time-out was taken to identify the correct patient, procedure and side prior to starting the procedure. With the patient in the side-lying position, with the right side up, the patient was prepped and draped in the usual sterile fashion using ChloraPrep and a fenestrated drape. A 25-gauge, 3.5-inch Quincke needle was introduced under intermittent fluoroscopy until the tip reached the lateral greater trochanter. The needle was then withdrawn 2 to 5 mm and contrast was injected to confirm intrabursal\par placement. The above injectate was then given. The needle was then removed intact.  The procedure was completed without complications and was tolerated well. The patient was monitored after the procedure. The patient was given post-procedure and discharge instructions to follow at home. The patient was discharged in stable condition. A follow-up plan was made.\par Pre-procedure pain score: 9/10\par Post-procedure pain score: 0/10\par

## 2023-06-24 NOTE — PHYSICAL EXAM
[de-identified] : PE OF HIP:\par TTP to R GTB \par mild right groin pain to IR\par \par TTP to BL lumbar and thoracic paraspinals and at the midline \par pain with flexion and extension \par

## 2023-06-24 NOTE — HISTORY OF PRESENT ILLNESS
[FreeTextEntry1] : HISTORY OF PRESENT ILLNESS: Mrs. Canseco is a 89-year-old female complaining of back and hip pain. The pain started 3 months ago after an undetermined event that worsened in the last 2 months. The pain is top of right hip along with right leg pain. The hip pain is severe at night and can not lay of the affected side due to severe pain, she sleeps on left side. During the day she reports of bilateral low back pain that may be worse due to prolong sitting. Patient describes the pain as severe.  During the last month the pain has been nearly constant, with symptoms worsening evening and night. Pain described as burning and sharp. Patient has weakness in the lower extremities.  Pain is increased with lying down. Pain is decreased with sitting. Bowel or bladder habits have not changed. The patient rates this pain 4-5/10 on the pain scale. \par \par ACTIVITIES: Patient could walk less than one block before the pain starts.  Patient can sit 2-3 hours before pain starts.  Patient can stand 10 minutes before pain starts.  The patient sometimes lies down because of pain.  Patient uses walker and wheelchair at this time.  Patient has difficulty [performing household chores, going to work, doing yard work or shopping, and participating in recreational activities at this time.\par PRIOR PAIN TREATMENTS: Moderate relief with physical therapy and cold treatment.\par

## 2023-06-27 ENCOUNTER — FORM ENCOUNTER (OUTPATIENT)
Age: 88
End: 2023-06-27

## 2023-06-29 ENCOUNTER — INPATIENT (INPATIENT)
Facility: HOSPITAL | Age: 88
LOS: 12 days | Discharge: NURSING, REHAB OR LONG-TERM CARE | DRG: 377 | End: 2023-07-12
Attending: STUDENT IN AN ORGANIZED HEALTH CARE EDUCATION/TRAINING PROGRAM | Admitting: INTERNAL MEDICINE
Payer: MEDICARE

## 2023-06-29 VITALS
OXYGEN SATURATION: 98 % | RESPIRATION RATE: 16 BRPM | SYSTOLIC BLOOD PRESSURE: 130 MMHG | DIASTOLIC BLOOD PRESSURE: 80 MMHG | WEIGHT: 143.08 LBS | HEART RATE: 96 BPM | TEMPERATURE: 98 F | HEIGHT: 62 IN

## 2023-06-29 LAB
ALBUMIN SERPL ELPH-MCNC: 3.6 G/DL — SIGNIFICANT CHANGE UP (ref 3.5–5.2)
ALP SERPL-CCNC: 61 U/L — SIGNIFICANT CHANGE UP (ref 30–115)
ALT FLD-CCNC: 13 U/L — SIGNIFICANT CHANGE UP (ref 0–41)
ANION GAP SERPL CALC-SCNC: 14 MMOL/L — SIGNIFICANT CHANGE UP (ref 7–14)
APTT BLD: 16.6 SEC — CRITICAL LOW (ref 27–39.2)
AST SERPL-CCNC: 13 U/L — SIGNIFICANT CHANGE UP (ref 0–41)
BASOPHILS # BLD AUTO: 0.03 K/UL — SIGNIFICANT CHANGE UP (ref 0–0.2)
BASOPHILS NFR BLD AUTO: 0.2 % — SIGNIFICANT CHANGE UP (ref 0–1)
BILIRUB SERPL-MCNC: 0.7 MG/DL — SIGNIFICANT CHANGE UP (ref 0.2–1.2)
BLD GP AB SCN SERPL QL: SIGNIFICANT CHANGE UP
BUN SERPL-MCNC: 56 MG/DL — HIGH (ref 10–20)
CALCIUM SERPL-MCNC: 8.9 MG/DL — SIGNIFICANT CHANGE UP (ref 8.4–10.5)
CHLORIDE SERPL-SCNC: 107 MMOL/L — SIGNIFICANT CHANGE UP (ref 98–110)
CO2 SERPL-SCNC: 20 MMOL/L — SIGNIFICANT CHANGE UP (ref 17–32)
CREAT SERPL-MCNC: 0.7 MG/DL — SIGNIFICANT CHANGE UP (ref 0.7–1.5)
EGFR: 81 ML/MIN/1.73M2 — SIGNIFICANT CHANGE UP
EOSINOPHIL # BLD AUTO: 0.02 K/UL — SIGNIFICANT CHANGE UP (ref 0–0.7)
EOSINOPHIL NFR BLD AUTO: 0.1 % — SIGNIFICANT CHANGE UP (ref 0–8)
GLUCOSE SERPL-MCNC: 150 MG/DL — HIGH (ref 70–99)
HCT VFR BLD CALC: 28.7 % — LOW (ref 37–47)
HGB BLD-MCNC: 9.4 G/DL — LOW (ref 12–16)
IMM GRANULOCYTES NFR BLD AUTO: 0.6 % — HIGH (ref 0.1–0.3)
INR BLD: 1.11 RATIO — SIGNIFICANT CHANGE UP (ref 0.65–1.3)
INR BLD: 1.16 RATIO — SIGNIFICANT CHANGE UP (ref 0.65–1.3)
LACTATE SERPL-SCNC: 2.9 MMOL/L — HIGH (ref 0.7–2)
LIDOCAIN IGE QN: 22 U/L — SIGNIFICANT CHANGE UP (ref 7–60)
LYMPHOCYTES # BLD AUTO: 1.42 K/UL — SIGNIFICANT CHANGE UP (ref 1.2–3.4)
LYMPHOCYTES # BLD AUTO: 10.1 % — LOW (ref 20.5–51.1)
MCHC RBC-ENTMCNC: 31.1 PG — HIGH (ref 27–31)
MCHC RBC-ENTMCNC: 32.8 G/DL — SIGNIFICANT CHANGE UP (ref 32–37)
MCV RBC AUTO: 95 FL — SIGNIFICANT CHANGE UP (ref 81–99)
MONOCYTES # BLD AUTO: 0.9 K/UL — HIGH (ref 0.1–0.6)
MONOCYTES NFR BLD AUTO: 6.4 % — SIGNIFICANT CHANGE UP (ref 1.7–9.3)
NEUTROPHILS # BLD AUTO: 11.65 K/UL — HIGH (ref 1.4–6.5)
NEUTROPHILS NFR BLD AUTO: 82.6 % — HIGH (ref 42.2–75.2)
NRBC # BLD: 0 /100 WBCS — SIGNIFICANT CHANGE UP (ref 0–0)
PLATELET # BLD AUTO: 212 K/UL — SIGNIFICANT CHANGE UP (ref 130–400)
PMV BLD: 11 FL — HIGH (ref 7.4–10.4)
POTASSIUM SERPL-MCNC: 4.7 MMOL/L — SIGNIFICANT CHANGE UP (ref 3.5–5)
POTASSIUM SERPL-SCNC: 4.7 MMOL/L — SIGNIFICANT CHANGE UP (ref 3.5–5)
PROT SERPL-MCNC: 5.7 G/DL — LOW (ref 6–8)
PROTHROM AB SERPL-ACNC: 12.7 SEC — SIGNIFICANT CHANGE UP (ref 9.95–12.87)
PROTHROM AB SERPL-ACNC: 13.3 SEC — HIGH (ref 9.95–12.87)
RBC # BLD: 3.02 M/UL — LOW (ref 4.2–5.4)
RBC # FLD: 13.6 % — SIGNIFICANT CHANGE UP (ref 11.5–14.5)
SODIUM SERPL-SCNC: 141 MMOL/L — SIGNIFICANT CHANGE UP (ref 135–146)
TROPONIN T SERPL-MCNC: <0.01 NG/ML — SIGNIFICANT CHANGE UP
WBC # BLD: 14.1 K/UL — HIGH (ref 4.8–10.8)
WBC # FLD AUTO: 14.1 K/UL — HIGH (ref 4.8–10.8)

## 2023-06-29 PROCEDURE — 71045 X-RAY EXAM CHEST 1 VIEW: CPT | Mod: 26

## 2023-06-29 PROCEDURE — 99285 EMERGENCY DEPT VISIT HI MDM: CPT | Mod: FS

## 2023-06-29 RX ORDER — HALOPERIDOL DECANOATE 100 MG/ML
5 INJECTION INTRAMUSCULAR ONCE
Refills: 0 | Status: COMPLETED | OUTPATIENT
Start: 2023-06-29 | End: 2023-06-29

## 2023-06-29 RX ORDER — SODIUM CHLORIDE 9 MG/ML
1000 INJECTION INTRAMUSCULAR; INTRAVENOUS; SUBCUTANEOUS ONCE
Refills: 0 | Status: COMPLETED | OUTPATIENT
Start: 2023-06-29 | End: 2023-06-29

## 2023-06-29 RX ORDER — MORPHINE SULFATE 50 MG/1
4 CAPSULE, EXTENDED RELEASE ORAL ONCE
Refills: 0 | Status: DISCONTINUED | OUTPATIENT
Start: 2023-06-29 | End: 2023-06-29

## 2023-06-29 RX ORDER — MIDAZOLAM HYDROCHLORIDE 1 MG/ML
1 INJECTION, SOLUTION INTRAMUSCULAR; INTRAVENOUS ONCE
Refills: 0 | Status: DISCONTINUED | OUTPATIENT
Start: 2023-06-29 | End: 2023-06-29

## 2023-06-29 RX ORDER — PANTOPRAZOLE SODIUM 20 MG/1
40 TABLET, DELAYED RELEASE ORAL ONCE
Refills: 0 | Status: COMPLETED | OUTPATIENT
Start: 2023-06-29 | End: 2023-06-29

## 2023-06-29 RX ADMIN — HALOPERIDOL DECANOATE 5 MILLIGRAM(S): 100 INJECTION INTRAMUSCULAR at 23:20

## 2023-06-29 RX ADMIN — MIDAZOLAM HYDROCHLORIDE 1 MILLIGRAM(S): 1 INJECTION, SOLUTION INTRAMUSCULAR; INTRAVENOUS at 22:09

## 2023-06-29 RX ADMIN — MIDAZOLAM HYDROCHLORIDE 1 MILLIGRAM(S): 1 INJECTION, SOLUTION INTRAMUSCULAR; INTRAVENOUS at 20:40

## 2023-06-29 RX ADMIN — SODIUM CHLORIDE 1000 MILLILITER(S): 9 INJECTION INTRAMUSCULAR; INTRAVENOUS; SUBCUTANEOUS at 20:34

## 2023-06-29 RX ADMIN — MORPHINE SULFATE 4 MILLIGRAM(S): 50 CAPSULE, EXTENDED RELEASE ORAL at 23:42

## 2023-06-29 NOTE — ED PROVIDER NOTE - OBJECTIVE STATEMENT
89 yo female with a pmh of htn, hld, cad, dementia present for weakness and abdominal pain that started this morning. pt has not experienced any urinary changes. daughter has noted pt's stool to be dark today. no fevers, chill, headache, recent illness/travel, cough, chest pain, or SOB.

## 2023-06-29 NOTE — ED PROVIDER NOTE - PHYSICAL EXAMINATION
Gen: NAD  Head: NCAT  HEENT: PERRL, oral mucosa moist, normal conjunctiva, oropharynx clear without exudate or erythema  Lung: CTAB, no respiratory distress, no wheezing, rales, rhonchi  CV: normal s1/s2, rrr, Normal perfusion, pulses 2+ throughout  Abd: soft, LLQ tenderness, ND, no CVA tenderness  Genitourinary: no pelvic tenderness  MSK: No edema, no visible deformities, full range of motion in all 4 extremities  Neuro: CN II-XII grossly intact, No focal neurologic deficits  Skin: No rash   Psych: normal affect

## 2023-06-29 NOTE — ED PROVIDER NOTE - CLINICAL SUMMARY MEDICAL DECISION MAKING FREE TEXT BOX
Signout received from Dr. Koch.  Patient presented for evaluation abdominal pain and weakness for the past day.  On exam was found to have melena.  Required labs and abdominal imaging.  Patient was found to be anemic with a drop in hemoglobin in the past week.  Abdominal CAT scan cannot be completed due to patient agitation.  Patient started PPI.  Will be admitted for further evaluation and management.

## 2023-06-29 NOTE — ED PROVIDER NOTE - ATTENDING APP SHARED VISIT CONTRIBUTION OF CARE
89-year-old female history of dyslipidemia hypertension CAD high blood thinners consideration of dark stool for the past few days which described as black daughter.  In addition patient Dors is upper quadrant abdominal pain no fever chills.  Here exam patient distress S1-S2 clear to auscultation belly left lower quadrant abdominal tenderness without rebound or guarding rectal exam with dark stool concerning for melena  Impression  Patient here with abdominal pain and melanotic stool.  Here the hemoglobin is 9.4 with previously being 11.9.  In addition the patient's BUN is 56.  Patient pending CT abdomen pelvis.  The patient went for CT but was anxious now needs anxiolysis prior to obtaining CT imaging.  Patient was given dose of Protonix to treat suspected melanotic stool.

## 2023-06-29 NOTE — ED PROVIDER NOTE - DISPOSITION TYPE
Per result note of 06/29/18-Chlamydia testing was positive. This should be treated with azithromycin 1g PO x1 and can be sent to pharmacy of her choice. She should avoid intercourse for 7 days, and contact her partner/s for testing. She does also have BV; if she is bothered by symptoms from this (discharge, odor, itching), it would be fine to treat this with metronidazole 500mg PO BID x7 days. She should NOT drink alcohol while on metronidazole.    Patient notified and RXs sent to pharmacy per patient request.  AAKASH Au RN     ADMIT

## 2023-06-29 NOTE — ED ADULT NURSE NOTE - NSFALLHARMRISKINTERV_ED_ALL_ED
Assistance OOB with selected safe patient handling equipment if applicable/Assistance with ambulation/Communicate risk of Fall with Harm to all staff, patient, and family/Monitor for mental status changes and reorient to person, place, and time, as needed/Move patient closer to nursing station/within visual sight of ED staff/Provide visual cue: red socks, yellow wristband, yellow gown, etc/Reinforce activity limits and safety measures with patient and family/Toileting schedule using arm’s reach rule for commode and bathroom/Use of alarms - bed, stretcher, chair and/or video monitoring/Bed in lowest position, wheels locked, appropriate side rails in place/Call bell, personal items and telephone in reach/Instruct patient to call for assistance before getting out of bed/chair/stretcher/Non-slip footwear applied when patient is off stretcher/[place] to call system/Physically safe environment - no spills, clutter or unnecessary equipment/Purposeful Proactive Rounding/Room/bathroom lighting operational, light cord in reach

## 2023-06-30 DIAGNOSIS — K92.1 MELENA: ICD-10-CM

## 2023-06-30 PROBLEM — I10 ESSENTIAL (PRIMARY) HYPERTENSION: Chronic | Status: ACTIVE | Noted: 2023-06-23

## 2023-06-30 PROBLEM — I25.10 ATHEROSCLEROTIC HEART DISEASE OF NATIVE CORONARY ARTERY WITHOUT ANGINA PECTORIS: Chronic | Status: ACTIVE | Noted: 2023-06-23

## 2023-06-30 LAB
APPEARANCE UR: CLEAR — SIGNIFICANT CHANGE UP
BILIRUB UR-MCNC: NEGATIVE — SIGNIFICANT CHANGE UP
COLOR SPEC: SIGNIFICANT CHANGE UP
DIFF PNL FLD: NEGATIVE — SIGNIFICANT CHANGE UP
GLUCOSE UR QL: NEGATIVE — SIGNIFICANT CHANGE UP
HCT VFR BLD CALC: 23 % — LOW (ref 37–47)
HCT VFR BLD CALC: 25.5 % — LOW (ref 37–47)
HGB BLD-MCNC: 7.4 G/DL — LOW (ref 12–16)
HGB BLD-MCNC: 8.6 G/DL — LOW (ref 12–16)
KETONES UR-MCNC: NEGATIVE — SIGNIFICANT CHANGE UP
LACTATE SERPL-SCNC: 1.7 MMOL/L — SIGNIFICANT CHANGE UP (ref 0.7–2)
LACTATE SERPL-SCNC: 3.4 MMOL/L — HIGH (ref 0.7–2)
LEUKOCYTE ESTERASE UR-ACNC: NEGATIVE — SIGNIFICANT CHANGE UP
MCHC RBC-ENTMCNC: 30.5 PG — SIGNIFICANT CHANGE UP (ref 27–31)
MCHC RBC-ENTMCNC: 31 PG — SIGNIFICANT CHANGE UP (ref 27–31)
MCHC RBC-ENTMCNC: 32.2 G/DL — SIGNIFICANT CHANGE UP (ref 32–37)
MCHC RBC-ENTMCNC: 33.7 G/DL — SIGNIFICANT CHANGE UP (ref 32–37)
MCV RBC AUTO: 92.1 FL — SIGNIFICANT CHANGE UP (ref 81–99)
MCV RBC AUTO: 94.7 FL — SIGNIFICANT CHANGE UP (ref 81–99)
NITRITE UR-MCNC: NEGATIVE — SIGNIFICANT CHANGE UP
NRBC # BLD: 0 /100 WBCS — SIGNIFICANT CHANGE UP (ref 0–0)
NRBC # BLD: 0 /100 WBCS — SIGNIFICANT CHANGE UP (ref 0–0)
PH UR: 6 — SIGNIFICANT CHANGE UP (ref 5–8)
PLATELET # BLD AUTO: 153 K/UL — SIGNIFICANT CHANGE UP (ref 130–400)
PLATELET # BLD AUTO: 194 K/UL — SIGNIFICANT CHANGE UP (ref 130–400)
PMV BLD: 10.4 FL — SIGNIFICANT CHANGE UP (ref 7.4–10.4)
PMV BLD: 9.9 FL — SIGNIFICANT CHANGE UP (ref 7.4–10.4)
PROT UR-MCNC: SIGNIFICANT CHANGE UP
RBC # BLD: 2.43 M/UL — LOW (ref 4.2–5.4)
RBC # BLD: 2.77 M/UL — LOW (ref 4.2–5.4)
RBC # FLD: 14 % — SIGNIFICANT CHANGE UP (ref 11.5–14.5)
RBC # FLD: 14.1 % — SIGNIFICANT CHANGE UP (ref 11.5–14.5)
SP GR SPEC: 1.02 — SIGNIFICANT CHANGE UP (ref 1.01–1.03)
UROBILINOGEN FLD QL: SIGNIFICANT CHANGE UP
WBC # BLD: 12.37 K/UL — HIGH (ref 4.8–10.8)
WBC # BLD: 16.43 K/UL — HIGH (ref 4.8–10.8)
WBC # FLD AUTO: 12.37 K/UL — HIGH (ref 4.8–10.8)
WBC # FLD AUTO: 16.43 K/UL — HIGH (ref 4.8–10.8)

## 2023-06-30 PROCEDURE — 97162 PT EVAL MOD COMPLEX 30 MIN: CPT | Mod: GP

## 2023-06-30 PROCEDURE — 99222 1ST HOSP IP/OBS MODERATE 55: CPT

## 2023-06-30 PROCEDURE — 92610 EVALUATE SWALLOWING FUNCTION: CPT | Mod: GN

## 2023-06-30 PROCEDURE — 74177 CT ABD & PELVIS W/CONTRAST: CPT | Mod: 26

## 2023-06-30 PROCEDURE — 87635 SARS-COV-2 COVID-19 AMP PRB: CPT

## 2023-06-30 PROCEDURE — 86923 COMPATIBILITY TEST ELECTRIC: CPT

## 2023-06-30 PROCEDURE — C9113: CPT

## 2023-06-30 PROCEDURE — 84100 ASSAY OF PHOSPHORUS: CPT

## 2023-06-30 PROCEDURE — 86850 RBC ANTIBODY SCREEN: CPT

## 2023-06-30 PROCEDURE — 93010 ELECTROCARDIOGRAM REPORT: CPT

## 2023-06-30 PROCEDURE — 74177 CT ABD & PELVIS W/CONTRAST: CPT | Mod: MA

## 2023-06-30 PROCEDURE — 81003 URINALYSIS AUTO W/O SCOPE: CPT

## 2023-06-30 PROCEDURE — 83735 ASSAY OF MAGNESIUM: CPT

## 2023-06-30 PROCEDURE — 86901 BLOOD TYPING SEROLOGIC RH(D): CPT

## 2023-06-30 PROCEDURE — 97530 THERAPEUTIC ACTIVITIES: CPT | Mod: GP

## 2023-06-30 PROCEDURE — 86900 BLOOD TYPING SEROLOGIC ABO: CPT

## 2023-06-30 PROCEDURE — 36430 TRANSFUSION BLD/BLD COMPNT: CPT

## 2023-06-30 PROCEDURE — 85027 COMPLETE CBC AUTOMATED: CPT

## 2023-06-30 PROCEDURE — 80048 BASIC METABOLIC PNL TOTAL CA: CPT

## 2023-06-30 PROCEDURE — 36415 COLL VENOUS BLD VENIPUNCTURE: CPT

## 2023-06-30 PROCEDURE — 93005 ELECTROCARDIOGRAM TRACING: CPT

## 2023-06-30 PROCEDURE — 82962 GLUCOSE BLOOD TEST: CPT

## 2023-06-30 PROCEDURE — 85025 COMPLETE CBC W/AUTO DIFF WBC: CPT

## 2023-06-30 PROCEDURE — P9016: CPT

## 2023-06-30 PROCEDURE — 83605 ASSAY OF LACTIC ACID: CPT

## 2023-06-30 PROCEDURE — 73521 X-RAY EXAM HIPS BI 2 VIEWS: CPT

## 2023-06-30 PROCEDURE — 97116 GAIT TRAINING THERAPY: CPT | Mod: GP

## 2023-06-30 PROCEDURE — 80053 COMPREHEN METABOLIC PANEL: CPT

## 2023-06-30 PROCEDURE — 99232 SBSQ HOSP IP/OBS MODERATE 35: CPT | Mod: GC

## 2023-06-30 RX ORDER — SODIUM CHLORIDE 9 MG/ML
1000 INJECTION, SOLUTION INTRAVENOUS
Refills: 0 | Status: DISCONTINUED | OUTPATIENT
Start: 2023-06-30 | End: 2023-06-30

## 2023-06-30 RX ORDER — LATANOPROST 0.05 MG/ML
1 SOLUTION/ DROPS OPHTHALMIC; TOPICAL AT BEDTIME
Refills: 0 | Status: DISCONTINUED | OUTPATIENT
Start: 2023-06-30 | End: 2023-07-12

## 2023-06-30 RX ORDER — MORPHINE SULFATE 50 MG/1
4 CAPSULE, EXTENDED RELEASE ORAL EVERY 6 HOURS
Refills: 0 | Status: DISCONTINUED | OUTPATIENT
Start: 2023-06-30 | End: 2023-06-30

## 2023-06-30 RX ORDER — DORZOLAMIDE HYDROCHLORIDE 20 MG/ML
1 SOLUTION/ DROPS OPHTHALMIC
Refills: 0 | Status: DISCONTINUED | OUTPATIENT
Start: 2023-06-30 | End: 2023-07-12

## 2023-06-30 RX ORDER — TRAMADOL HYDROCHLORIDE 50 MG/1
50 TABLET ORAL EVERY 6 HOURS
Refills: 0 | Status: DISCONTINUED | OUTPATIENT
Start: 2023-06-30 | End: 2023-06-30

## 2023-06-30 RX ORDER — TRAMADOL HYDROCHLORIDE 50 MG/1
25 TABLET ORAL EVERY 8 HOURS
Refills: 0 | Status: DISCONTINUED | OUTPATIENT
Start: 2023-06-30 | End: 2023-07-01

## 2023-06-30 RX ORDER — TIMOLOL 0.5 %
1 DROPS OPHTHALMIC (EYE) EVERY 12 HOURS
Refills: 0 | Status: DISCONTINUED | OUTPATIENT
Start: 2023-06-30 | End: 2023-07-12

## 2023-06-30 RX ORDER — METOPROLOL TARTRATE 50 MG
25 TABLET ORAL DAILY
Refills: 0 | Status: DISCONTINUED | OUTPATIENT
Start: 2023-06-30 | End: 2023-06-30

## 2023-06-30 RX ORDER — LIDOCAINE 4 G/100G
1 CREAM TOPICAL EVERY 24 HOURS
Refills: 0 | Status: DISCONTINUED | OUTPATIENT
Start: 2023-06-30 | End: 2023-07-12

## 2023-06-30 RX ORDER — SODIUM CHLORIDE 9 MG/ML
1000 INJECTION, SOLUTION INTRAVENOUS
Refills: 0 | Status: DISCONTINUED | OUTPATIENT
Start: 2023-06-30 | End: 2023-07-01

## 2023-06-30 RX ORDER — PANTOPRAZOLE SODIUM 20 MG/1
8 TABLET, DELAYED RELEASE ORAL
Qty: 80 | Refills: 0 | Status: DISCONTINUED | OUTPATIENT
Start: 2023-06-30 | End: 2023-07-02

## 2023-06-30 RX ORDER — PANTOPRAZOLE SODIUM 20 MG/1
40 TABLET, DELAYED RELEASE ORAL
Refills: 0 | Status: DISCONTINUED | OUTPATIENT
Start: 2023-06-30 | End: 2023-06-30

## 2023-06-30 RX ORDER — DORZOLAMIDE HYDROCHLORIDE TIMOLOL MALEATE 20; 5 MG/ML; MG/ML
1 SOLUTION/ DROPS OPHTHALMIC EVERY 12 HOURS
Refills: 0 | Status: DISCONTINUED | OUTPATIENT
Start: 2023-06-30 | End: 2023-06-30

## 2023-06-30 RX ORDER — POLYETHYLENE GLYCOL 3350 17 G/17G
17 POWDER, FOR SOLUTION ORAL DAILY
Refills: 0 | Status: DISCONTINUED | OUTPATIENT
Start: 2023-06-30 | End: 2023-07-12

## 2023-06-30 RX ORDER — MORPHINE SULFATE 50 MG/1
1 CAPSULE, EXTENDED RELEASE ORAL ONCE
Refills: 0 | Status: DISCONTINUED | OUTPATIENT
Start: 2023-06-30 | End: 2023-06-30

## 2023-06-30 RX ORDER — ATORVASTATIN CALCIUM 80 MG/1
10 TABLET, FILM COATED ORAL AT BEDTIME
Refills: 0 | Status: DISCONTINUED | OUTPATIENT
Start: 2023-06-30 | End: 2023-06-30

## 2023-06-30 RX ORDER — ACETAMINOPHEN 500 MG
650 TABLET ORAL EVERY 6 HOURS
Refills: 0 | Status: DISCONTINUED | OUTPATIENT
Start: 2023-06-30 | End: 2023-07-04

## 2023-06-30 RX ORDER — SENNA PLUS 8.6 MG/1
2 TABLET ORAL AT BEDTIME
Refills: 0 | Status: DISCONTINUED | OUTPATIENT
Start: 2023-06-30 | End: 2023-07-12

## 2023-06-30 RX ADMIN — SODIUM CHLORIDE 60 MILLILITER(S): 9 INJECTION, SOLUTION INTRAVENOUS at 04:40

## 2023-06-30 RX ADMIN — TRAMADOL HYDROCHLORIDE 25 MILLIGRAM(S): 50 TABLET ORAL at 19:59

## 2023-06-30 RX ADMIN — SENNA PLUS 2 TABLET(S): 8.6 TABLET ORAL at 22:17

## 2023-06-30 RX ADMIN — MORPHINE SULFATE 1 MILLIGRAM(S): 50 CAPSULE, EXTENDED RELEASE ORAL at 22:14

## 2023-06-30 RX ADMIN — Medication 5 MILLIGRAM(S): at 17:11

## 2023-06-30 RX ADMIN — PANTOPRAZOLE SODIUM 10 MG/HR: 20 TABLET, DELAYED RELEASE ORAL at 17:08

## 2023-06-30 RX ADMIN — LIDOCAINE 1 PATCH: 4 CREAM TOPICAL at 19:59

## 2023-06-30 RX ADMIN — PANTOPRAZOLE SODIUM 40 MILLIGRAM(S): 20 TABLET, DELAYED RELEASE ORAL at 01:46

## 2023-06-30 RX ADMIN — DORZOLAMIDE HYDROCHLORIDE 1 DROP(S): 20 SOLUTION/ DROPS OPHTHALMIC at 06:09

## 2023-06-30 RX ADMIN — TRAMADOL HYDROCHLORIDE 25 MILLIGRAM(S): 50 TABLET ORAL at 20:45

## 2023-06-30 RX ADMIN — POLYETHYLENE GLYCOL 3350 17 GRAM(S): 17 POWDER, FOR SOLUTION ORAL at 17:11

## 2023-06-30 RX ADMIN — DORZOLAMIDE HYDROCHLORIDE 1 DROP(S): 20 SOLUTION/ DROPS OPHTHALMIC at 17:24

## 2023-06-30 RX ADMIN — Medication 1 DROP(S): at 06:10

## 2023-06-30 RX ADMIN — MORPHINE SULFATE 1 MILLIGRAM(S): 50 CAPSULE, EXTENDED RELEASE ORAL at 23:00

## 2023-06-30 RX ADMIN — PANTOPRAZOLE SODIUM 40 MILLIGRAM(S): 20 TABLET, DELAYED RELEASE ORAL at 06:10

## 2023-06-30 RX ADMIN — MORPHINE SULFATE 4 MILLIGRAM(S): 50 CAPSULE, EXTENDED RELEASE ORAL at 10:00

## 2023-06-30 RX ADMIN — SODIUM CHLORIDE 90 MILLILITER(S): 9 INJECTION, SOLUTION INTRAVENOUS at 22:27

## 2023-06-30 RX ADMIN — LATANOPROST 1 DROP(S): 0.05 SOLUTION/ DROPS OPHTHALMIC; TOPICAL at 22:15

## 2023-06-30 RX ADMIN — Medication 1 DROP(S): at 17:24

## 2023-06-30 NOTE — CONSULT NOTE ADULT - ATTENDING COMMENTS
89 yr old with hx of dark stools JONG brown stools case d/w with family. No intervention Rec supportive care.

## 2023-06-30 NOTE — PATIENT PROFILE ADULT - FALL HARM RISK - HARM RISK INTERVENTIONS

## 2023-06-30 NOTE — H&P ADULT - NSHPPHYSICALEXAM_GEN_ALL_CORE
PHYSICAL EXAM:  GENERAL: NAD, speaks in full sentences, no signs of respiratory distress  HEAD:  Atraumatic, Normocephalic  EYES: EOMI, PERRLA, anicteric sclera  NECK: Supple, No JVD  CHEST/LUNG: Clear to auscultation bilaterally; No wheeze; No crackles; No accessory muscles used  HEART: Regular rate and rhythm; No murmurs;   ABDOMEN: Soft, Nontender, Nondistended; Bowel sounds present; No guarding  EXTREMITIES:  2+ Peripheral Pulses, No cyanosis or edema  PSYCH: AAOx3  NEUROLOGY: non-focal  SKIN: No rashes or lesions PHYSICAL EXAM:  GENERAL: NAD, speaks in full sentences, no signs of respiratory distress  HEAD:  Atraumatic, Normocephalic  EYES: EOMI, PERRLA, anicteric sclera  NECK: Supple, No JVD  CHEST/LUNG: Clear to auscultation bilaterally; No wheeze; No crackles; No accessory muscles used  HEART: Regular rate and rhythm; No murmurs;   ABDOMEN: Soft, tender, Nondistended; Bowel sounds present; No guarding  EXTREMITIES:  2+ Peripheral Pulses, No cyanosis or edema  PSYCH: AAOx1  NEUROLOGY: non-focal  SKIN: No rashes or lesions

## 2023-06-30 NOTE — H&P ADULT - HISTORY OF PRESENT ILLNESS
89 yo female with a pmh of htn, hld, cad, dementia present for weakness and abdominal pain that started this morning. pt has not experienced any urinary changes. daughter has noted pt's stool to be dark today. no fevers, chill, headache, recent illness/travel, cough, chest pain, or SOB    In the ED,   Vitals: /60, HR 96, RR 16, T 98.1, satting 98 percent on RA   Labs: WBC 14.10. Hgb 9.4 (11.9 on 6/21), , Na 141, K 4.7, BUN 56, Cr .7, lactate 2.9, UA negative   Imaging: CTAP pending     in the ED, given fluids and PPI   Admitted to medicine for GI bleed  89yo F with a PMH of HTN, HLD, CAD, Dementia(A&O1 at baseline) present for weakness and abdominal pain that started this morning. History per the ED note as patient and unable to reach daughter (Called twice with no answer). Pt noted to have dark black stools for the past few days. Pt on AC per ED. No fevers, chill, headache, recent illness/travel, cough, chest pain, or SOB.    In the ED,   Vitals: /60, HR 96, RR 16, T 98.1, satting 98 percent on RA   Labs: WBC 14.10. Hgb 9.4 (11.9 on 6/21), , Na 141, K 4.7, BUN 56, Cr .7, lactate 2.9, UA negative   Imaging: CTAP pending     in the ED, given fluids and PPI   Admitted to medicine for GI bleed

## 2023-06-30 NOTE — PROGRESS NOTE ADULT - SUBJECTIVE AND OBJECTIVE BOX
24H events:    Patient is a 89y old Female who presents with a chief complaint of Melena (2023 13:53)     Today is hospital day 1. Patient was non communicative, with Polish  #[id number]. Daughter at bedside provided further history for the patient.    PAST MEDICAL & SURGICAL HISTORY  CAD (coronary artery disease)    HTN (hypertension)      SOCIAL HISTORY:  Social History:      ALLERGIES:  No Known Allergies    MEDICATIONS:  STANDING MEDICATIONS  dorzolamide 2% Ophthalmic Solution 1 Drop(s) Both EYES <User Schedule>  lactated ringers. 1000 milliLiter(s) IV Continuous <Continuous>  latanoprost 0.005% Ophthalmic Solution 1 Drop(s) Both EYES at bedtime  lidocaine   4% Patch 1 Patch Transdermal every 24 hours  pantoprazole Infusion 8 mG/Hr IV Continuous <Continuous>  polyethylene glycol 3350 17 Gram(s) Oral daily  predniSONE   Tablet 5 milliGRAM(s) Oral daily  senna 2 Tablet(s) Oral at bedtime  timolol 0.5% Solution 1 Drop(s) Both EYES every 12 hours    PRN MEDICATIONS  acetaminophen     Tablet .. 650 milliGRAM(s) Oral every 6 hours PRN  traMADol 25 milliGRAM(s) Oral every 8 hours PRN    VITALS:   T(F): 98  HR: 98  BP: 105/66  RR: 18  SpO2: 100%    PHYSICAL EXAM:  GENERAL: NAD  NERVOUS SYSTEM:  Alert & Oriented x0  CHEST/LUNG: Clear to auscultation bilaterally; No rales, rhonchi, wheezing, or rubs  HEART: Regular rate and rhythm; No murmurs, rubs, or gallops  ABDOMEN: tenderness in lower abdomen  EXTREMITIES:  No edema    LABS:                        7.4    16.43 )-----------( 194      ( 2023 12:55 )             23.0     06-    141  |  107  |  56<H>  ----------------------------<  150<H>  4.7   |  20  |  0.7    Ca    8.9      2023 19:39    TPro  5.7<L>  /  Alb  3.6  /  TBili  0.7  /  DBili  x   /  AST  13  /  ALT  13  /  AlkPhos  61  -    PT/INR - ( 2023 22:09 )   PT: 13.30 sec;   INR: 1.16 ratio         PTT - ( 2023 22:09 )  PTT:16.6 sec  Urinalysis Basic - ( 2023 23:29 )    Color: Light Yellow / Appearance: Clear / S.022 / pH: x  Gluc: x / Ketone: Negative  / Bili: Negative / Urobili: <2 mg/dL   Blood: x / Protein: Trace / Nitrite: Negative   Leuk Esterase: Negative / RBC: x / WBC x   Sq Epi: x / Non Sq Epi: x / Bacteria: x        Lactate, Blood: 3.4 mmol/L *H* (23 @ 12:55)  Lactate, Blood: 2.9 mmol/L *H* (23 @ 19:39)  Troponin T, Serum: <0.01 ng/mL (23 @ 19:39)      CARDIAC MARKERS ( 2023 19:39 )  x     / <0.01 ng/mL / x     / x     / x          RADIOLOGY:  < from: CT Abdomen and Pelvis w/ IV Cont (23 @ 08:34) >  1. Since 2022, no definite evidence of acute/inflammatory   process within the abdomen or pelvis.    2. 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    3. New mild age-indeterminate compression deformity of the L2 vertebral   body; correlation with point tenderness is suggested.    < end of copied text >    < from: Xray Chest 1 View- PORTABLE-Urgent (23 @ 21:20) >  Impression:    No radiographic evidence of acute cardiopulmonary disease.    Ectatic thoracic aorta    < end of copied text >

## 2023-06-30 NOTE — PROGRESS NOTE ADULT - ASSESSMENT
89yo F with a PMH of HTN, HLD, CAD, and advanced dementia presenting with melena in the setting of NSAID and steroid use.      #UGIB 2/2 to possible PUD presenting with melena and anemia  - vitals signs demonstrated tachycardia of 101-105; BP stable but on the lower side of normal  - Labs show a leukocytosis of 14k, Hb of 9.4 from a baseline of 12g/dl, lactate 2.9, BUN/Cr 56/0.7   - pt has been taking meloxicam since her pelvic fracture about 2 weeks in early 6/2023  - has been on chronic prednisone 5mg for 3 years  - does not take a ppi at home and has been having epigastric pain the past 2 weeks  - denies use of pepto bismol and iron tablets  - no previous cscope or EGD; no FH of CRC  - CTAP: no active bleeding noted  - 2 large bore IVs  - PPI 40mg bid switched to PPI drip  - hold home metoprolol  - IVF  - repeat lactate until normalized  - npo until Hb stablizes  - GI recs appreciated  - daughter wants conservative therapy  - transfuse if Hb <8 due to CAD  - hold aspirin  - SCDs  - bleeding and fall precautions  - repeat Hb showed a 2g drop to 7.4 g/dL  - will transfuse 1 unit and get a cbc 3 hours from end of transfusion  - type and screen  - cbc bid  - will speak to family again about EGD    #chronic prednisone use for sob?  - pt has been on prednisone for 3 years  - was on 10mg until she was tapered to 5mg recently  - follows op with Dr. Sousa    #AAA  - pt noted to have a 3.9 AAA that has increased by 0.4 since 1 year ago  - given its size and rate of growth, will need op surveillance with an US  - avoid HTN    #chronic L2 compression fracture  - abdominal binder  - TLSO brace on discharge    #CAD  #HTN  #HLD  - unclear history of whether it was ischemic cardiomyopathy; no stents; no CHF  - on aspirin, statin and metoprolol at home  - hold oral meds for now    #pelvic fracture following fall  - treat pain with lidocaine patch and tramadol  - bowel regimen  - bladder scan q 8hrs x 3  - if >400 cc, please place hollings[place]  - bowel regimen    #glaucoma  - c/w timolol, dorzolamide, latanoprost    #advanced dementia  - not on any medications  - redirection  - daily orientation  - keep shades to window open    #proph  - vte: SCD  - gi: miralax and ppi  - fall and bleeding precautions     89yo F with a PMH of HTN, HLD, CAD, and advanced dementia presenting with melena in the setting of NSAID and steroid use.      #UGIB 2/2 to possible PUD presenting with melena and anemia  - vitals signs demonstrated tachycardia of 101-105; BP stable but on the lower side of normal  - Labs show a leukocytosis of 14k, Hb of 9.4 from a baseline of 12g/dl, lactate 2.9, BUN/Cr 56/0.7   - pt has been taking meloxicam since her pelvic fracture about 2 weeks in early 6/2023  - has been on chronic prednisone 5mg for 3 years  - does not take a ppi at home and has been having epigastric pain the past 2 weeks  - denies use of pepto bismol and iron tablets  - no previous cscope or EGD; no FH of CRC  - CTAP: no active bleeding noted  - 2 large bore IVs  - PPI 40mg bid switched to PPI drip  - hold home metoprolol  - IVF  - repeat lactate until normalized  - npo until Hb stablizes  - GI recs appreciated  - daughter wants conservative therapy  - transfuse if Hb <8 due to CAD  - hold aspirin  - SCDs  - bleeding and fall precautions  - repeat Hb showed a 2g drop to 7.4 g/dL  - will transfuse 1 unit and get a cbc 3 hours from end of transfusion  - type and screen  - cbc bid  - will speak to family again about EGD    #chronic prednisone use for sob?  - pt has been on prednisone for 3 years  - was on 10mg until she was tapered to 5mg recently  - follows op with Dr. Sousa    #AAA  - pt noted to have a 3.9 AAA that has increased by 0.4 since 1 year ago  - given its size and rate of growth, will need op surveillance with an US  - avoid HTN    #chronic L2 compression fracture  - abdominal binder  - TLSO brace on discharge    #CAD  #HTN  #HLD  - unclear history of whether it was ischemic cardiomyopathy; no stents; no CHF  - on aspirin, statin and metoprolol at home  - hold oral meds for now    #pelvic fracture following fall  - treat pain with lidocaine patch and tramadol  - bowel regimen  - bladder scan q 8hrs x 3  - if >400 cc, please place hollings[place]  - bowel regimen    #glaucoma  - c/w timolol, dorzolamide, latanoprost    #advanced dementia  - not on any medications  - redirection  - daily orientation  - keep shades to window open    #proph  - vte: SCD  - gi: miralax and ppi  - fall and bleeding precautions  - CODE status: DNR/DNI as determined by her daughter who is her hcp as pt's advanced dementia precludes her from making an informed decision on her own

## 2023-06-30 NOTE — PATIENT PROFILE ADULT - NSPROHMSYMPCOND_GEN_A_NUR
Patient b/p 96/67 at this time MAP 77. Patient asleep at this time. NAD noted Will continue to monitor     Unable to get info. Cognitive impairment.

## 2023-06-30 NOTE — CHART NOTE - NSCHARTNOTEFT_GEN_A_CORE
I agree with primary team, pt lacks capacity to make decisions, agree daughter is decision maker, daughter confirmed pt DNR/DNI

## 2023-06-30 NOTE — PROGRESS NOTE ADULT - ASSESSMENT
89 yo female with a pmh of htn, hld, cad, dementia present for weakness and abdominal pain that started this morning. Found to aslo have black stool.     #Upper GI Bleed   #Melena   #Weakness and Abdominal pain  - on chronic prednisone 5mg for 3 years  - no previous colonoscopy, no FHx of CRC  - per daughters  pt has had black stools for last few days   - Transfuse to keep >8 (has CAD), Active type and screen (next: 7/1)  - hgb 9.4 -> 7.4  - 1 unit of pRBC transfused -> f/u repeat CBC  - BUN/Cr Ratio >30   - PPI 40 mg bid -> PPI drip  - f/u repeat lactate   - hold aspirin, home metoprolol  - IVF    #chronic prednisone use for sob?  - on prednisone for 3 years  - was on 10mg, tapered to 5mg recently     #AAA  - 3.9 AAA that increased by 0.4 since 1 year ago  - US surveillance as o/p  - avoid HTN    #pelvic fracture following fall  - treat pain with lidocaine patch and tramadol  - bowel regimen  - bladder scan q 8hrs x 3  - if >400 cc, please place hollings[place]  - bowel regimen    #glaucoma  - c/w timolol, dorzolamide, latanoprost    #chronic L2 compression fracture  - abdominal binder  - TLSO brace on discharge    #CAD  #HTN   #HLD   - hold oral meds for now    #Dementia   - daily orientation    Misc:   DVT: SCDs  GI: PPI IV BID   Diet: NPO

## 2023-06-30 NOTE — CONSULT NOTE ADULT - SUBJECTIVE AND OBJECTIVE BOX
Gastroenterology Consultation:    Patient is a 89y old  Female who presents with a chief complaint of     Admitted on: 06-30-23      HPI:  89yo F with a PMH of HTN, HLD, CAD, Dementia(A&O1 at baseline) present for weakness and abdominal pain that started this morning. History per the ED note as patient and unable to reach daughter (Called twice with no answer). Pt noted to have dark black stools for the past few days. Pt on AC per ED. No fevers, chill, headache, recent illness/travel, cough, chest pain, or SOB.    In the ED,   Vitals: /60, HR 96, RR 16, T 98.1, satting 98 percent on RA   Labs: WBC 14.10. Hgb 9.4 (11.9 on 6/21), , Na 141, K 4.7, BUN 56, Cr .7, lactate 2.9, UA negative   Imaging: CTAP pending     in the ED, given fluids and PPI   Admitted to medicine for GI bleed  (30 Jun 2023 03:41)        Prior EGD/Colonoscopy:  no records available     PAST MEDICAL & SURGICAL HISTORY:  CAD (coronary artery disease)      HTN (hypertension)            FAMILY HISTORY:  No reported Fhx of GI cancers     Social History:  Tobacco: no reported hx   Alcohol:  no reported hx   Drugs: no reported hx     Home Medications:  acetaminophen 325 mg oral tablet: 2 tab(s) orally every 6 hours (21 Jun 2022 12:32)  atorvastatin 10 mg tablet:  (14 Jun 2022 01:26)  brimonidine 0.15 % eye drops: 1 milliliter(s) to each affected eye 3 times a day (21 Jun 2022 12:32)  furosemide 20 mg tablet:  (14 Jun 2022 01:26)  gabapentin 250 mg/5 mL oral solution: 2 milliliter(s) orally 3 times a day (21 Jun 2022 12:32)  latanoprost 0.005 % eye drops: 1 milliliter(s) to each affected eye once a day (at bedtime) (21 Jun 2022 12:32)  METOPROLOL SUCC ER 25 MG TAB: 1 tab(s) orally once a day (21 Jun 2022 12:32)  PREDNISONE 5 MG TABLET: 1 tab(s) orally once a day (21 Jun 2022 12:32)  valsartan 80 mg tablet:  (14 Jun 2022 01:26)        MEDICATIONS  (STANDING):  atorvastatin 10 milliGRAM(s) Oral at bedtime  dorzolamide 2% Ophthalmic Solution 1 Drop(s) Both EYES <User Schedule>  lactated ringers. 1000 milliLiter(s) (60 mL/Hr) IV Continuous <Continuous>  latanoprost 0.005% Ophthalmic Solution 1 Drop(s) Both EYES at bedtime  pantoprazole  Injectable 40 milliGRAM(s) IV Push two times a day  timolol 0.5% Solution 1 Drop(s) Both EYES every 12 hours    MEDICATIONS  (PRN):  acetaminophen     Tablet .. 650 milliGRAM(s) Oral every 6 hours PRN Temp greater or equal to 38C (100.4F), Mild Pain (1 - 3)  traMADol 50 milliGRAM(s) Oral every 6 hours PRN Moderate Pain (4 - 6)      Allergies  No Known Allergies      Review of Systems:   unable to obtain       Physical Examination:  T(C): 36.5 (06-30-23 @ 03:15), Max: 36.7 (06-29-23 @ 18:08)  HR: 101 (06-30-23 @ 03:15) (96 - 101)  BP: 110/73 (06-30-23 @ 03:15) (110/73 - 119/60)  RR: 18 (06-30-23 @ 03:15) (16 - 18)  SpO2: 100% (06-30-23 @ 03:15) (98% - 100%)  Height (cm): 157.5 (06-29-23 @ 18:08)  Weight (kg): 64.9 (06-29-23 @ 18:08)        GENERAL: AAOx0, no acute distress.  HEAD:  Atraumatic, Normocephalic  EYES: conjunctiva and sclera clear  NECK: Supple, no JVD or thyromegaly  CHEST/LUNG: Clear to auscultation bilaterally  HEART: Regular rate and rhythm; normal S1, S2, No murmurs.  ABDOMEN: Soft, nontender, nondistended JONG brown stool   NEUROLOGY: No asterixis or tremor.   SKIN: Intact, no jaundice        Data:                        9.4    14.10 )-----------( 212      ( 29 Jun 2023 19:39 )             28.7     Hgb Trend:  9.4  06-29-23 @ 19:39      06-29    141  |  107  |  56<H>  ----------------------------<  150<H>  4.7   |  20  |  0.7    Ca    8.9      29 Jun 2023 19:39    TPro  5.7<L>  /  Alb  3.6  /  TBili  0.7  /  DBili  x   /  AST  13  /  ALT  13  /  AlkPhos  61  06-29    Liver panel trend:  TBili 0.7   /   AST 13   /   ALT 13   /   AlkP 61   /   Tptn 5.7   /   Alb 3.6    /   DBili --      06-29  TBili 0.7   /   AST 24   /   ALT 17   /   AlkP 76   /   Tptn 6.5   /   Alb 4.1    /   DBili --      06-21      PT/INR - ( 29 Jun 2023 22:09 )   PT: 13.30 sec;   INR: 1.16 ratio         PTT - ( 29 Jun 2023 22:09 )  PTT:16.6 sec        Radiology:  no abdominal imaging available this admission      Gastroenterology Consultation:    Patient is a 89y old  Female who presents with a chief complaint of     Admitted on: 06-30-23      HPI:  89yo F with a PMH of HTN, HLD, CAD, Dementia(A&O1 at baseline) present for weakness and abdominal pain that started this morning. Daughter reports dark stool since yesterday and lower abdominal pain. history was obtained form the daughter at bedside. daughter denies any vomiting, hematochezia, hematemesis or weight loss. Patient is hemodynamically stable, Hemoglobin on admission is 9.4 (baseline 10-11). labs significant for elevated lactate and leukocytosis. GI consulted to r/o melena.        Prior EGD/Colonoscopy:  no records available     PAST MEDICAL & SURGICAL HISTORY:  CAD (coronary artery disease)      HTN (hypertension)            FAMILY HISTORY:  No reported Fhx of GI cancers     Social History:  Tobacco: no reported hx   Alcohol:  no reported hx   Drugs: no reported hx     Home Medications:  acetaminophen 325 mg oral tablet: 2 tab(s) orally every 6 hours (21 Jun 2022 12:32)  atorvastatin 10 mg tablet:  (14 Jun 2022 01:26)  brimonidine 0.15 % eye drops: 1 milliliter(s) to each affected eye 3 times a day (21 Jun 2022 12:32)  furosemide 20 mg tablet:  (14 Jun 2022 01:26)  gabapentin 250 mg/5 mL oral solution: 2 milliliter(s) orally 3 times a day (21 Jun 2022 12:32)  latanoprost 0.005 % eye drops: 1 milliliter(s) to each affected eye once a day (at bedtime) (21 Jun 2022 12:32)  METOPROLOL SUCC ER 25 MG TAB: 1 tab(s) orally once a day (21 Jun 2022 12:32)  PREDNISONE 5 MG TABLET: 1 tab(s) orally once a day (21 Jun 2022 12:32)  valsartan 80 mg tablet:  (14 Jun 2022 01:26)        MEDICATIONS  (STANDING):  atorvastatin 10 milliGRAM(s) Oral at bedtime  dorzolamide 2% Ophthalmic Solution 1 Drop(s) Both EYES <User Schedule>  lactated ringers. 1000 milliLiter(s) (60 mL/Hr) IV Continuous <Continuous>  latanoprost 0.005% Ophthalmic Solution 1 Drop(s) Both EYES at bedtime  pantoprazole  Injectable 40 milliGRAM(s) IV Push two times a day  timolol 0.5% Solution 1 Drop(s) Both EYES every 12 hours    MEDICATIONS  (PRN):  acetaminophen     Tablet .. 650 milliGRAM(s) Oral every 6 hours PRN Temp greater or equal to 38C (100.4F), Mild Pain (1 - 3)  traMADol 50 milliGRAM(s) Oral every 6 hours PRN Moderate Pain (4 - 6)      Allergies  No Known Allergies      Review of Systems:   unable to obtain       Physical Examination:  T(C): 36.5 (06-30-23 @ 03:15), Max: 36.7 (06-29-23 @ 18:08)  HR: 101 (06-30-23 @ 03:15) (96 - 101)  BP: 110/73 (06-30-23 @ 03:15) (110/73 - 119/60)  RR: 18 (06-30-23 @ 03:15) (16 - 18)  SpO2: 100% (06-30-23 @ 03:15) (98% - 100%)  Height (cm): 157.5 (06-29-23 @ 18:08)  Weight (kg): 64.9 (06-29-23 @ 18:08)        GENERAL: AAOx0, no acute distress.  HEAD:  Atraumatic, Normocephalic  EYES: conjunctiva and sclera clear  NECK: Supple, no JVD or thyromegaly  CHEST/LUNG: Clear to auscultation bilaterally  HEART: Regular rate and rhythm; normal S1, S2, No murmurs.  ABDOMEN: Soft, nontender, nondistended JONG brown stool   NEUROLOGY: No asterixis or tremor.   SKIN: Intact, no jaundice        Data:                        9.4    14.10 )-----------( 212      ( 29 Jun 2023 19:39 )             28.7     Hgb Trend:  9.4  06-29-23 @ 19:39      06-29    141  |  107  |  56<H>  ----------------------------<  150<H>  4.7   |  20  |  0.7    Ca    8.9      29 Jun 2023 19:39    TPro  5.7<L>  /  Alb  3.6  /  TBili  0.7  /  DBili  x   /  AST  13  /  ALT  13  /  AlkPhos  61  06-29    Liver panel trend:  TBili 0.7   /   AST 13   /   ALT 13   /   AlkP 61   /   Tptn 5.7   /   Alb 3.6    /   DBili --      06-29  TBili 0.7   /   AST 24   /   ALT 17   /   AlkP 76   /   Tptn 6.5   /   Alb 4.1    /   DBili --      06-21      PT/INR - ( 29 Jun 2023 22:09 )   PT: 13.30 sec;   INR: 1.16 ratio         PTT - ( 29 Jun 2023 22:09 )  PTT:16.6 sec        Radiology:  < from: CT Abdomen and Pelvis w/ IV Cont (06.30.23 @ 08:34) >  ACC: [id number] EXAM:  CT ABDOMEN AND PELVIS IC   ORDERED BY: HARMEET PADRON     PROCEDURE DATE:  06/30/2023          INTERPRETATION:  CLINICAL STATEMENT: Left lower quadrant tenderness, dark   black stools for the past few days.    TECHNIQUE: Contiguous axial CT images were obtained from the lower chest   to the pubic symphysis following administration of 100cc Omnipaque 350   intravenous contrast. Oral contrast was not administered. Reformatted   images in the coronal and sagittal planes wereacquired.    COMPARISON: CT abdomen and pelvis 6/14/2022.    FINDINGS:    LOWER CHEST: Breathing motion artifact limits exam. Bibasilar atelectasis.    HEPATOBILIARY: Unremarkable.    SPLEEN: Unremarkable.    PANCREAS: Unremarkable.    ADRENAL GLANDS: Unremarkable.    KIDNEYS: Symmetric renal enhancement. Left renal cyst and additional   bilateral subcentimeter hypodensities too small to characterize. No   hydronephrosis.    ABDOMINOPELVIC NODES: No enlarged abdominal or pelvic lymph nodes.    PELVIC ORGANS: Unremarkable.    PERITONEUM/MESENTERY/BOWEL: Sigmoid and descending colonic diverticulosis   with no CT evidence of acute diverticulitis. No bowel obstruction,   ascites or free air.    BONES/SOFT TISSUES: Small left inguinal hernia. Chronic pubic body and   right inferior pubic ramus fractures (4/316 and 337). Healed right sacral   fracture (4/223). New mild age-indeterminate compression deformity of the   L2 vertebral body.    VASCULAR: Infrarenal abdominal aortic aneurysm measuring3.9 cm just   proximal to the bifurcation, increased since prior 3.5 cm, with mural   thrombus (4/172). 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 Unchanged left   common iliac artery aneurysm measuring 1.8 cm.        IMPRESSION:    1. Since June 14, 2022, no definite evidence of acute/inflammatory   process within the abdomen or pelvis.    2. 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    3. New mild age-indeterminate compression deformity of the L2 vertebral   body; correlation with point tenderness is suggested.    --- End of Report ---          MERLE VALENZUELA MD; Resident Radiologist  This document has been electronically signed.  ROMINA JOHNSON MD; Attending Radiologist  This document has been electronically signed. Jun 30 2023  9:57AM    < end of copied text >

## 2023-06-30 NOTE — H&P ADULT - NSHPLABSRESULTS_GEN_ALL_CORE
.  LABS:                         9.4    14.10 )-----------( 212      ( 2023 19:39 )             28.7         141  |  107  |  56<H>  ----------------------------<  150<H>  4.7   |  20  |  0.7    Ca    8.9      2023 19:39    TPro  5.7<L>  /  Alb  3.6  /  TBili  0.7  /  DBili  x   /  AST  13  /  ALT  13  /  AlkPhos  61      PT/INR - ( 2023 22:09 )   PT: 13.30 sec;   INR: 1.16 ratio         PTT - ( 2023 22:09 )  PTT:16.6 sec  Urinalysis Basic - ( 2023 23:29 )    Color: Light Yellow / Appearance: Clear / S.022 / pH: x  Gluc: x / Ketone: Negative  / Bili: Negative / Urobili: <2 mg/dL   Blood: x / Protein: Trace / Nitrite: Negative   Leuk Esterase: Negative / RBC: x / WBC x   Sq Epi: x / Non Sq Epi: x / Bacteria: x        Lactate, Blood: 2.9 mmol/L ( @ 19:39)      RADIOLOGY, EKG & ADDITIONAL TESTS: Reviewed.

## 2023-06-30 NOTE — H&P ADULT - ASSESSMENT
89 yo female with a pmh of htn, hld, cad, dementia present for weakness and abdominal pain that started this morning. Found to aslo have black stool.     #Upper GI Bleed   #Melena   - per daughters had black stool   - JONG showing   - hgb 9.4, was 11.9 on the 21st   - BUN/Cr Ratio >30  Plan   - 2 large bore IVs   - PPI IV BID   - Transfuse to keep >8 (has CAD), Active type and screen   - IVF   - GI consult   - Trend H&H   - Hold AC     #Abdominal pain   - CTAP pending   - UA negative   - Pt has leukocytosis , but no fevers, tachy, or hypotension. Hold Abx for now     #CAD  #HTN   #HLD   - c/w home meds     #Dementia   - c/w home medications     Misc:   DVT: Hold   GI: PPI IV BID   Diet: NPO   Activity: AAT   Pending: CBC, GI consult  89 yo female with a pmh of htn, hld, cad, dementia present for weakness and abdominal pain that started this morning. Found to aslo have black stool.     Please reach out to daughter in AM for med rec and better history. Attempted to call twice with no answer and pt poor historian. Surescripts checked, will add most recent meds from there but please confirm with daughter. Prednisone 5 seen, but unsure of reason.     #Upper GI Bleed   #Melena   #Weakness and Abdominal pain  - per daughters  pt has had black stools for last few days   - hgb 9.4, was 11.9 on the 21st   - BUN/Cr Ratio >30   - Per surescripts pt seems to be on prednisone for a few months now? Could be stress ulcer   Plan   - 2 large bore IVs   - PPI IV BID   - Transfuse to keep >8 (has CAD), Active type and screen   - IVF   - GI consult   - Trend H&H   - Hold AC   - CTAP pending     #CAD  #HTN   #HLD   - c/w home meds     #Dementia   - c/w home medications     Misc:   DVT: Hold   GI: PPI IV BID   Diet: NPO   Activity: AAT   Pending: CBC, GI consult

## 2023-06-30 NOTE — PROGRESS NOTE ADULT - SUBJECTIVE AND OBJECTIVE BOX
Patient is a 89y old  Female who presents with a chief complaint of melena    SUBJECTIVE / OVERNIGHT EVENTS: Pt is sleeping after being agitated and not getting any sleep. she was having hypogastric pain which was relieved by urinating. She was being helped to the bathroom by the RN and was peeing on the way there. Her bladder scan this am was 298. Daughter is bedside. She states that the pt has been taking meloxicam for the pain in her pelvis. 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 She was on 10mg and then decreased to 5mg more recently. The daughter states she is not on ppi and starting having melena that started 1 day ago. She has been complaining about epigastric and esophageal pain the last 1-2 weeks as well. Denies any weight loss and no previous c-scope/EGD and no FH of any cancers. Daughter bedside would not like her to undergo a procedure and would prefer that this be treated with   ADDITIONAL REVIEW OF SYSTEMS: as above    MEDICATIONS  (STANDING):  atorvastatin 10 milliGRAM(s) Oral at bedtime  dorzolamide 2% Ophthalmic Solution 1 Drop(s) Both EYES <User Schedule>  lactated ringers. 1000 milliLiter(s) (60 mL/Hr) IV Continuous <Continuous>  latanoprost 0.005% Ophthalmic Solution 1 Drop(s) Both EYES at bedtime  lidocaine   4% Patch 1 Patch Transdermal every 24 hours  pantoprazole  Injectable 40 milliGRAM(s) IV Push two times a day  polyethylene glycol 3350 17 Gram(s) Oral daily  predniSONE   Tablet 5 milliGRAM(s) Oral daily  senna 2 Tablet(s) Oral at bedtime  timolol 0.5% Solution 1 Drop(s) Both EYES every 12 hours    MEDICATIONS  (PRN):  acetaminophen     Tablet .. 650 milliGRAM(s) Oral every 6 hours PRN Temp greater or equal to 38C (100.4F), Mild Pain (1 - 3)  traMADol 25 milliGRAM(s) Oral every 8 hours PRN Severe Pain (7 - 10)      CAPILLARY BLOOD GLUCOSE        I&O's Summary      PHYSICAL EXAM:  Vital Signs Last 24 Hrs  T(C): 36.7 (2023 12:09), Max: 36.7 (2023 18:08)  T(F): 98 (2023 12:09), Max: 98.1 (2023 18:08)  HR: 98 (2023 12:09) (96 - 101)  BP: 105/66 (2023 12:09) (105/66 - 119/60)  BP(mean): --  RR: 18 (2023 12:09) (16 - 18)  SpO2: 100% (2023 03:15) (98% - 100%)      CONSTITUTIONAL: NAD, well-developed, well-groomed  EYES: PERRLA; conjunctiva and sclera clear  ENMT: Moist oral mucosa, no pharyngeal injection or exudates; normal dentition  NECK: Supple, no palpable masses; no thyromegaly  RESPIRATORY: Normal respiratory effort; lungs are clear to auscultation bilaterally  CARDIOVASCULAR: Regular rate and rhythm, normal S1 and S2, no murmur/rub/gallop; No lower extremity edema; Peripheral pulses are 2+ bilaterally  ABDOMEN: Nontender to palpation, normoactive bowel sounds, no rebound/guarding; No hepatosplenomegaly  MUSCULOSKELETAL:  Normal gait; no clubbing or cyanosis of digits; no joint swelling or tenderness to palpation  PSYCH: A+O to person, place, and time; affect appropriate  NEUROLOGY: CN 2-12 are intact and symmetric; no gross sensory deficits   SKIN: No rashes; no palpable lesions    LABS:                        7.4    16.43 )-----------( 194      ( 2023 12:55 )             23.0     06-29    141  |  107  |  56<H>  ----------------------------<  150<H>  4.7   |  20  |  0.7    Ca    8.9      2023 19:39    TPro  5.7<L>  /  Alb  3.6  /  TBili  0.7  /  DBili  x   /  AST  13  /  ALT  13  /  AlkPhos  61  06-29    PT/INR - ( 2023 22:09 )   PT: 13.30 sec;   INR: 1.16 ratio         PTT - ( 2023 22:09 )  PTT:16.6 sec  CARDIAC MARKERS ( 2023 19:39 )  x     / <0.01 ng/mL / x     / x     / x          Urinalysis Basic - ( 2023 23:29 )    Color: Light Yellow / Appearance: Clear / S.022 / pH: x  Gluc: x / Ketone: Negative  / Bili: Negative / Urobili: <2 mg/dL   Blood: x / Protein: Trace / Nitrite: Negative   Leuk Esterase: Negative / RBC: x / WBC x   Sq Epi: x / Non Sq Epi: x / Bacteria: x          RADIOLOGY & ADDITIONAL TESTS:  Results Reviewed:   Imaging Personally Reviewed:  Electrocardiogram Personally Reviewed:    COORDINATION OF CARE:  Care Discussed with Consultants/Other Providers [Y/N]:  Prior or Outpatient Records Reviewed [Y/N]:         Patient is a 89y old  Female who presents with a chief complaint of melena    SUBJECTIVE / OVERNIGHT EVENTS: Pt is sleeping after being agitated and not getting any sleep. she was having hypogastric pain which was relieved by urinating. She was being helped to the bathroom by the RN and was peeing on the way there. Her bladder scan this am was 298. Daughter is bedside. She states that the pt has been taking meloxicam for the pain in her pelvis. 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 She was on 10mg and then decreased to 5mg more recently. The daughter states she is not on ppi and starting having melena that started 1 day ago. She has been complaining about epigastric and esophageal pain the last 1-2 weeks as well. Denies any weight loss and no previous c-scope/EGD and no FH of any cancers. Daughter bedside would not like her to undergo a procedure and would prefer that this be treated conservatively.   ADDITIONAL REVIEW OF SYSTEMS: as above    MEDICATIONS  (STANDING):  atorvastatin 10 milliGRAM(s) Oral at bedtime  dorzolamide 2% Ophthalmic Solution 1 Drop(s) Both EYES <User Schedule>  lactated ringers. 1000 milliLiter(s) (60 mL/Hr) IV Continuous <Continuous>  latanoprost 0.005% Ophthalmic Solution 1 Drop(s) Both EYES at bedtime  lidocaine   4% Patch 1 Patch Transdermal every 24 hours  pantoprazole  Injectable 40 milliGRAM(s) IV Push two times a day  polyethylene glycol 3350 17 Gram(s) Oral daily  predniSONE   Tablet 5 milliGRAM(s) Oral daily  senna 2 Tablet(s) Oral at bedtime  timolol 0.5% Solution 1 Drop(s) Both EYES every 12 hours    MEDICATIONS  (PRN):  acetaminophen     Tablet .. 650 milliGRAM(s) Oral every 6 hours PRN Temp greater or equal to 38C (100.4F), Mild Pain (1 - 3)  traMADol 25 milliGRAM(s) Oral every 8 hours PRN Severe Pain (7 - 10)      CAPILLARY BLOOD GLUCOSE        I&O's Summary      PHYSICAL EXAM:  Vital Signs Last 24 Hrs  T(C): 36.7 (2023 12:09), Max: 36.7 (2023 18:08)  T(F): 98 (2023 12:09), Max: 98.1 (2023 18:08)  HR: 98 (2023 12:09) (96 - 101)  BP: 105/66 (2023 12:09) (105/66 - 119/60)  BP(mean): --  RR: 18 (2023 12:09) (16 - 18)  SpO2: 100% (2023 03:15) (98% - 100%)      CONSTITUTIONAL: NAD, elderly    NECK: Supple, no palpable masses; no thyromegaly  RESPIRATORY: Normal respiratory effort; lungs are clear to auscultation bilaterally  CARDIOVASCULAR: Regular rate and rhythm, normal S1 and S2, no murmur/rub/gallop; No lower extremity edema; Peripheral pulses are 2+ bilaterally  ABDOMEN: hypogastrium tednerness, normoactive bowel sounds, no rebound/guarding; No hepatosplenomegaly  MUSCULOSKELETAL:  did not assess gait no clubbing or cyanosis of digits; no joint swelling or tenderness to palpation  PSYCH: A+O x 0-1 (her name)  SKIN: No rashes; no palpable lesions    LABS:                        7.4    16.43 )-----------( 194      ( 2023 12:55 )             23.0     06-29    141  |  107  |  56<H>  ----------------------------<  150<H>  4.7   |  20  |  0.7    Ca    8.9      2023 19:39    TPro  5.7<L>  /  Alb  3.6  /  TBili  0.7  /  DBili  x   /  AST  13  /  ALT  13  /  AlkPhos  61  06-29    PT/INR - ( 2023 22:09 )   PT: 13.30 sec;   INR: 1.16 ratio         PTT - ( 2023 22:09 )  PTT:16.6 sec  CARDIAC MARKERS ( 2023 19:39 )  x     / <0.01 ng/mL / x     / x     / x          Urinalysis Basic - ( 2023 23:29 )    Color: Light Yellow / Appearance: Clear / S.022 / pH: x  Gluc: x / Ketone: Negative  / Bili: Negative / Urobili: <2 mg/dL   Blood: x / Protein: Trace / Nitrite: Negative   Leuk Esterase: Negative / RBC: x / WBC x   Sq Epi: x / Non Sq Epi: x / Bacteria: x          RADIOLOGY & ADDITIONAL TESTS:  Results Reviewed:   Imaging Personally Reviewed:  Electrocardiogram Personally Reviewed:    COORDINATION OF CARE:  Care Discussed with Consultants/Other Providers [Y/N]:  Prior or Outpatient Records Reviewed [Y/N]:         Patient is a 89y old  Female who presents with a chief complaint of melena    SUBJECTIVE / OVERNIGHT EVENTS: Pt is sleeping after being agitated and in pain earlier. She was having hypogastric pain which was relieved by urinating. She was being helped to the bathroom by the RN and was peeing on the way there. Her bladder scan this am was 298. Daughter is bedside. She states that the pt has been taking meloxicam for the pain in her pelvis. 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 She was on 10mg and then decreased to 5mg more recently. The daughter states she is not on ppi and starting having melena that started 1 day ago. She has been complaining about epigastric and esophageal pain the last 1-2 weeks as well. Denies any weight loss and no previous c-scope/EGD and no FH of any cancers. Daughter bedside would not like her to undergo a procedure and would prefer that this be treated conservatively.     After Hb dropped 2g went back to speak to daughter again who is open to an endoscopy if her mom does not respond to transfusions and ppi. She filled out a molst form last year where she made her mother DNR/DNI.    ADDITIONAL REVIEW OF SYSTEMS: as above    MEDICATIONS  (STANDING):  atorvastatin 10 milliGRAM(s) Oral at bedtime  dorzolamide 2% Ophthalmic Solution 1 Drop(s) Both EYES <User Schedule>  lactated ringers. 1000 milliLiter(s) (60 mL/Hr) IV Continuous <Continuous>  latanoprost 0.005% Ophthalmic Solution 1 Drop(s) Both EYES at bedtime  lidocaine   4% Patch 1 Patch Transdermal every 24 hours  pantoprazole  Injectable 40 milliGRAM(s) IV Push two times a day  polyethylene glycol 3350 17 Gram(s) Oral daily  predniSONE   Tablet 5 milliGRAM(s) Oral daily  senna 2 Tablet(s) Oral at bedtime  timolol 0.5% Solution 1 Drop(s) Both EYES every 12 hours    MEDICATIONS  (PRN):  acetaminophen     Tablet .. 650 milliGRAM(s) Oral every 6 hours PRN Temp greater or equal to 38C (100.4F), Mild Pain (1 - 3)  traMADol 25 milliGRAM(s) Oral every 8 hours PRN Severe Pain (7 - 10)      CAPILLARY BLOOD GLUCOSE        I&O's Summary      PHYSICAL EXAM:  Vital Signs Last 24 Hrs  T(C): 36.7 (2023 12:09), Max: 36.7 (2023 18:08)  T(F): 98 (2023 12:09), Max: 98.1 (2023 18:08)  HR: 98 (2023 12:09) (96 - 101)  BP: 105/66 (2023 12:09) (105/66 - 119/60)  BP(mean): --  RR: 18 (2023 12:09) (16 - 18)  SpO2: 100% (2023 03:15) (98% - 100%)      CONSTITUTIONAL: NAD, elderly    NECK: Supple, no palpable masses; no thyromegaly  RESPIRATORY: Normal respiratory effort; lungs are clear to auscultation bilaterally  CARDIOVASCULAR: Regular rate and rhythm, normal S1 and S2, no murmur/rub/gallop; No lower extremity edema; Peripheral pulses are 2+ bilaterally  ABDOMEN: hypogastrium tednerness, normoactive bowel sounds, no rebound/guarding; No hepatosplenomegaly  MUSCULOSKELETAL:  did not assess gait no clubbing or cyanosis of digits; no joint swelling or tenderness to palpation  PSYCH: A+O x 0-1 (her name)  SKIN: No rashes; no palpable lesions    LABS:                        7.4    16.43 )-----------( 194      ( 2023 12:55 )             23.0     06-29    141  |  107  |  56<H>  ----------------------------<  150<H>  4.7   |  20  |  0.7    Ca    8.9      2023 19:39    TPro  5.7<L>  /  Alb  3.6  /  TBili  0.7  /  DBili  x   /  AST  13  /  ALT  13  /  AlkPhos  61  06-29    PT/INR - ( 2023 22:09 )   PT: 13.30 sec;   INR: 1.16 ratio         PTT - ( 2023 22:09 )  PTT:16.6 sec  CARDIAC MARKERS ( 2023 19:39 )  x     / <0.01 ng/mL / x     / x     / x          Urinalysis Basic - ( 2023 23:29 )    Color: Light Yellow / Appearance: Clear / S.022 / pH: x  Gluc: x / Ketone: Negative  / Bili: Negative / Urobili: <2 mg/dL   Blood: x / Protein: Trace / Nitrite: Negative   Leuk Esterase: Negative / RBC: x / WBC x   Sq Epi: x / Non Sq Epi: x / Bacteria: x          RADIOLOGY & ADDITIONAL TESTS:  Results Reviewed:   Imaging Personally Reviewed:  Electrocardiogram Personally Reviewed:    COORDINATION OF CARE:  Care Discussed with Consultants/Other Providers [Y/N]:  Prior or Outpatient Records Reviewed [Y/N]:

## 2023-07-01 LAB
ALBUMIN SERPL ELPH-MCNC: 3.2 G/DL — LOW (ref 3.5–5.2)
ALP SERPL-CCNC: 54 U/L — SIGNIFICANT CHANGE UP (ref 30–115)
ALT FLD-CCNC: 14 U/L — SIGNIFICANT CHANGE UP (ref 0–41)
ANION GAP SERPL CALC-SCNC: 14 MMOL/L — SIGNIFICANT CHANGE UP (ref 7–14)
AST SERPL-CCNC: 22 U/L — SIGNIFICANT CHANGE UP (ref 0–41)
BASOPHILS # BLD AUTO: 0.04 K/UL — SIGNIFICANT CHANGE UP (ref 0–0.2)
BASOPHILS # BLD AUTO: 0.04 K/UL — SIGNIFICANT CHANGE UP (ref 0–0.2)
BASOPHILS NFR BLD AUTO: 0.4 % — SIGNIFICANT CHANGE UP (ref 0–1)
BASOPHILS NFR BLD AUTO: 0.5 % — SIGNIFICANT CHANGE UP (ref 0–1)
BILIRUB SERPL-MCNC: 0.8 MG/DL — SIGNIFICANT CHANGE UP (ref 0.2–1.2)
BLD GP AB SCN SERPL QL: SIGNIFICANT CHANGE UP
BUN SERPL-MCNC: 37 MG/DL — HIGH (ref 10–20)
CALCIUM SERPL-MCNC: 8.7 MG/DL — SIGNIFICANT CHANGE UP (ref 8.4–10.5)
CHLORIDE SERPL-SCNC: 109 MMOL/L — SIGNIFICANT CHANGE UP (ref 98–110)
CO2 SERPL-SCNC: 19 MMOL/L — SIGNIFICANT CHANGE UP (ref 17–32)
CREAT SERPL-MCNC: 0.8 MG/DL — SIGNIFICANT CHANGE UP (ref 0.7–1.5)
CULTURE RESULTS: SIGNIFICANT CHANGE UP
EGFR: 69 ML/MIN/1.73M2 — SIGNIFICANT CHANGE UP
EOSINOPHIL # BLD AUTO: 0.04 K/UL — SIGNIFICANT CHANGE UP (ref 0–0.7)
EOSINOPHIL # BLD AUTO: 0.07 K/UL — SIGNIFICANT CHANGE UP (ref 0–0.7)
EOSINOPHIL NFR BLD AUTO: 0.4 % — SIGNIFICANT CHANGE UP (ref 0–8)
EOSINOPHIL NFR BLD AUTO: 0.8 % — SIGNIFICANT CHANGE UP (ref 0–8)
GLUCOSE BLDC GLUCOMTR-MCNC: 162 MG/DL — HIGH (ref 70–99)
GLUCOSE SERPL-MCNC: 141 MG/DL — HIGH (ref 70–99)
HCT VFR BLD CALC: 23.5 % — LOW (ref 37–47)
HCT VFR BLD CALC: 24.3 % — LOW (ref 37–47)
HGB BLD-MCNC: 7.8 G/DL — LOW (ref 12–16)
HGB BLD-MCNC: 8.2 G/DL — LOW (ref 12–16)
IMM GRANULOCYTES NFR BLD AUTO: 0.7 % — HIGH (ref 0.1–0.3)
IMM GRANULOCYTES NFR BLD AUTO: 0.8 % — HIGH (ref 0.1–0.3)
LACTATE SERPL-SCNC: 1.8 MMOL/L — SIGNIFICANT CHANGE UP (ref 0.7–2)
LACTATE SERPL-SCNC: 2.4 MMOL/L — HIGH (ref 0.7–2)
LYMPHOCYTES # BLD AUTO: 0.7 K/UL — LOW (ref 1.2–3.4)
LYMPHOCYTES # BLD AUTO: 0.72 K/UL — LOW (ref 1.2–3.4)
LYMPHOCYTES # BLD AUTO: 6.5 % — LOW (ref 20.5–51.1)
LYMPHOCYTES # BLD AUTO: 8.1 % — LOW (ref 20.5–51.1)
MAGNESIUM SERPL-MCNC: 1.7 MG/DL — LOW (ref 1.8–2.4)
MCHC RBC-ENTMCNC: 31.1 PG — HIGH (ref 27–31)
MCHC RBC-ENTMCNC: 31.3 PG — HIGH (ref 27–31)
MCHC RBC-ENTMCNC: 33.2 G/DL — SIGNIFICANT CHANGE UP (ref 32–37)
MCHC RBC-ENTMCNC: 33.7 G/DL — SIGNIFICANT CHANGE UP (ref 32–37)
MCV RBC AUTO: 92.7 FL — SIGNIFICANT CHANGE UP (ref 81–99)
MCV RBC AUTO: 93.6 FL — SIGNIFICANT CHANGE UP (ref 81–99)
MONOCYTES # BLD AUTO: 0.74 K/UL — HIGH (ref 0.1–0.6)
MONOCYTES # BLD AUTO: 0.77 K/UL — HIGH (ref 0.1–0.6)
MONOCYTES NFR BLD AUTO: 7.1 % — SIGNIFICANT CHANGE UP (ref 1.7–9.3)
MONOCYTES NFR BLD AUTO: 8.4 % — SIGNIFICANT CHANGE UP (ref 1.7–9.3)
NEUTROPHILS # BLD AUTO: 7.22 K/UL — HIGH (ref 1.4–6.5)
NEUTROPHILS # BLD AUTO: 9.13 K/UL — HIGH (ref 1.4–6.5)
NEUTROPHILS NFR BLD AUTO: 81.5 % — HIGH (ref 42.2–75.2)
NEUTROPHILS NFR BLD AUTO: 84.8 % — HIGH (ref 42.2–75.2)
NRBC # BLD: 0 /100 WBCS — SIGNIFICANT CHANGE UP (ref 0–0)
NRBC # BLD: 0 /100 WBCS — SIGNIFICANT CHANGE UP (ref 0–0)
PLATELET # BLD AUTO: 147 K/UL — SIGNIFICANT CHANGE UP (ref 130–400)
PLATELET # BLD AUTO: 159 K/UL — SIGNIFICANT CHANGE UP (ref 130–400)
PMV BLD: 10 FL — SIGNIFICANT CHANGE UP (ref 7.4–10.4)
PMV BLD: 10.4 FL — SIGNIFICANT CHANGE UP (ref 7.4–10.4)
POTASSIUM SERPL-MCNC: 4 MMOL/L — SIGNIFICANT CHANGE UP (ref 3.5–5)
POTASSIUM SERPL-SCNC: 4 MMOL/L — SIGNIFICANT CHANGE UP (ref 3.5–5)
PROT SERPL-MCNC: 5.1 G/DL — LOW (ref 6–8)
RBC # BLD: 2.51 M/UL — LOW (ref 4.2–5.4)
RBC # BLD: 2.62 M/UL — LOW (ref 4.2–5.4)
RBC # FLD: 14.9 % — HIGH (ref 11.5–14.5)
RBC # FLD: 14.9 % — HIGH (ref 11.5–14.5)
SODIUM SERPL-SCNC: 142 MMOL/L — SIGNIFICANT CHANGE UP (ref 135–146)
SPECIMEN SOURCE: SIGNIFICANT CHANGE UP
WBC # BLD: 10.77 K/UL — SIGNIFICANT CHANGE UP (ref 4.8–10.8)
WBC # BLD: 8.85 K/UL — SIGNIFICANT CHANGE UP (ref 4.8–10.8)
WBC # FLD AUTO: 10.77 K/UL — SIGNIFICANT CHANGE UP (ref 4.8–10.8)
WBC # FLD AUTO: 8.85 K/UL — SIGNIFICANT CHANGE UP (ref 4.8–10.8)

## 2023-07-01 PROCEDURE — 99232 SBSQ HOSP IP/OBS MODERATE 35: CPT

## 2023-07-01 PROCEDURE — 93010 ELECTROCARDIOGRAM REPORT: CPT | Mod: 77

## 2023-07-01 PROCEDURE — 93010 ELECTROCARDIOGRAM REPORT: CPT

## 2023-07-01 RX ORDER — MAGNESIUM SULFATE 500 MG/ML
2 VIAL (ML) INJECTION ONCE
Refills: 0 | Status: COMPLETED | OUTPATIENT
Start: 2023-07-01 | End: 2023-07-01

## 2023-07-01 RX ORDER — METOPROLOL TARTRATE 50 MG
5 TABLET ORAL ONCE
Refills: 0 | Status: COMPLETED | OUTPATIENT
Start: 2023-07-01 | End: 2023-07-01

## 2023-07-01 RX ORDER — HALOPERIDOL DECANOATE 100 MG/ML
1 INJECTION INTRAMUSCULAR ONCE
Refills: 0 | Status: COMPLETED | OUTPATIENT
Start: 2023-07-01 | End: 2023-07-01

## 2023-07-01 RX ORDER — SODIUM CHLORIDE 9 MG/ML
1000 INJECTION, SOLUTION INTRAVENOUS
Refills: 0 | Status: DISCONTINUED | OUTPATIENT
Start: 2023-07-01 | End: 2023-07-02

## 2023-07-01 RX ORDER — METOPROLOL TARTRATE 50 MG
25 TABLET ORAL
Refills: 0 | Status: DISCONTINUED | OUTPATIENT
Start: 2023-07-01 | End: 2023-07-01

## 2023-07-01 RX ORDER — MORPHINE SULFATE 50 MG/1
1 CAPSULE, EXTENDED RELEASE ORAL EVERY 6 HOURS
Refills: 0 | Status: DISCONTINUED | OUTPATIENT
Start: 2023-07-01 | End: 2023-07-07

## 2023-07-01 RX ORDER — SODIUM CHLORIDE 9 MG/ML
1000 INJECTION, SOLUTION INTRAVENOUS
Refills: 0 | Status: DISCONTINUED | OUTPATIENT
Start: 2023-07-01 | End: 2023-07-01

## 2023-07-01 RX ADMIN — LIDOCAINE 1 PATCH: 4 CREAM TOPICAL at 22:09

## 2023-07-01 RX ADMIN — Medication 5 MILLIGRAM(S): at 06:19

## 2023-07-01 RX ADMIN — LIDOCAINE 1 PATCH: 4 CREAM TOPICAL at 06:24

## 2023-07-01 RX ADMIN — PANTOPRAZOLE SODIUM 10 MG/HR: 20 TABLET, DELAYED RELEASE ORAL at 02:00

## 2023-07-01 RX ADMIN — HALOPERIDOL DECANOATE 1 MILLIGRAM(S): 100 INJECTION INTRAMUSCULAR at 17:47

## 2023-07-01 RX ADMIN — Medication 1 DROP(S): at 17:48

## 2023-07-01 RX ADMIN — LATANOPROST 1 DROP(S): 0.05 SOLUTION/ DROPS OPHTHALMIC; TOPICAL at 22:09

## 2023-07-01 RX ADMIN — Medication 25 GRAM(S): at 12:21

## 2023-07-01 RX ADMIN — LIDOCAINE 1 PATCH: 4 CREAM TOPICAL at 06:20

## 2023-07-01 RX ADMIN — MORPHINE SULFATE 1 MILLIGRAM(S): 50 CAPSULE, EXTENDED RELEASE ORAL at 20:24

## 2023-07-01 RX ADMIN — Medication 5 MILLIGRAM(S): at 17:47

## 2023-07-01 RX ADMIN — SODIUM CHLORIDE 110 MILLILITER(S): 9 INJECTION, SOLUTION INTRAVENOUS at 08:30

## 2023-07-01 RX ADMIN — MORPHINE SULFATE 1 MILLIGRAM(S): 50 CAPSULE, EXTENDED RELEASE ORAL at 20:06

## 2023-07-01 RX ADMIN — DORZOLAMIDE HYDROCHLORIDE 1 DROP(S): 20 SOLUTION/ DROPS OPHTHALMIC at 17:47

## 2023-07-01 RX ADMIN — SODIUM CHLORIDE 150 MILLILITER(S): 9 INJECTION, SOLUTION INTRAVENOUS at 12:56

## 2023-07-01 RX ADMIN — MORPHINE SULFATE 1 MILLIGRAM(S): 50 CAPSULE, EXTENDED RELEASE ORAL at 12:21

## 2023-07-01 NOTE — PROGRESS NOTE ADULT - ASSESSMENT
Verified Results  (1) C-REACTIVE PROTEIN [id number] 06:46PM Jesús Ramos Order Number: [id number]     Test Name Result Flag Reference   C-REACT PROTEIN 26 5 mg/L H <3 0 89yo F with a PMH of HTN, HLD, CAD, and advanced dementia presenting with melena in the setting of NSAID and steroid use.      #UGIB 2/2 to possible PUD presenting with melena and anemia  - vitals signs demonstrated tachycardia of 101-105; BP stable but on the lower side of normal  - Labs show a leukocytosis of 14k, Hb of 9.4 from a baseline of 12g/dl, lactate 2.9, BUN/Cr 56/0.7   - pt has been taking meloxicam since her pelvic fracture about 2 weeks in early 6/2023  - has been on chronic prednisone 5mg for 3 years  - does not take a ppi at home and has been having epigastric pain the past 2 weeks  - denies use of pepto bismol and iron tablets  - no previous cscope or EGD; no FH of CRC  - CTAP: no active bleeding noted  - 2 large bore IVs  - PPI 40mg bid switched to PPI drip  - resumed metoprolol but started with 5mg metoprolol IVP as pt refusing po  - IVF increased rate to 150cc/hr  - repeat lactate q8hrs until normalized  - npo until Hb stabilizes  - GI recs appreciated  - daughter wants conservative therapy  - transfuse if Hb <8 due to CAD  - hold aspirin  - hold NSAIDs  - SCDs  - bleeding and fall precautions  - repeat Hb showed a 2g drop to 7.4 g/dL  - received 1 unit prbc on 6/30  - Hb stable and above >8 g/dL  - type and screen  - cbc bid  - daughter would prefer a conservative approach and avoid an endoscopy    #afib with rvr, new vs paroxysmal  - pt refusing po meds  - will give IV metoprolol 5mg push  - can give further 2.5-5mg IVP metoprolol q 5 mins with a goal HR <100  - will not initiate anticoagulation after discussion of risks and benefits with daughter who understood the risks of not initiating anticoagulation  - CHADSVASC 4 indicating a 4.8% risk of stroke vs HAS BLED of 3 points indicating a 5.8% risk of bleeding       #chronic prednisone use for sob?  - pt has been on prednisone for 3 years  - was on 10mg until she was tapered to 5mg recently  - follows op with Dr. Sousa    #AAA  - pt noted to have a 3.9 AAA that has increased by 0.4 since 1 year ago  - given its size and rate of growth, will need op surveillance with an US  - avoid HTN    #chronic L2 compression fracture  - abdominal binder  - TLSO brace on discharge    #CAD  #HTN  #HLD  - unclear history of whether it was ischemic cardiomyopathy; no stents; no CHF  - on aspirin, statin and metoprolol at home  - hold oral meds for now    #pelvic fracture following fall  - treat pain with lidocaine patch and tramadol  - bowel regimen  - bladder scan q 8hrs x 3  - if >400 cc, please place [place]  - bowel regimen  - avoid NSAIDs    #glaucoma  - c/w timolol, dorzolamide, latanoprost    #advanced dementia  - not on any medications  - redirection  - daily orientation  - keep shades to window open  - IV haldol for agitation  - once pt is able to take po, can do seroquel or trazodone    #proph  - vte: SCD  - gi: miralax and ppi  - fall and bleeding precautions  - CODE status: DNR/DNI as determined by her daughter who is her hcp as pt's advanced dementia precludes her from making an informed decision on her own

## 2023-07-01 NOTE — CHART NOTE - NSCHARTNOTEFT_GEN_A_CORE
patient complained of chest pain and racing heart - stat ekg showed AFIB w rvr - started metoprolol; holding off on A/C as she is here for GIB eval - of note patient has chadsvasc 4 (4.8%) and low hasbled 3(5.8%) - pt admitted for melena and received blood transfusion yesterday,  discussed dx and a/c and daughter elected for no A/C at this time.

## 2023-07-01 NOTE — PROGRESS NOTE ADULT - SUBJECTIVE AND OBJECTIVE BOX
Patient is a 89y old  Female who presents with a chief complaint of melena    SUBJECTIVE / OVERNIGHT EVENTS: Pt was agitated overnight and in pain from her recent pelvic fracture. She had no BM for 3 days now. Abd is soft, but she has tachycardia. EKG shows afib with RVR with -120bpm. Daughter states she maybe had it once before, but unsure. Discussed the diagnosis and the treatment for it. Pt is on metoprolol at home which daughter had previously stated was for CAD and was on hold due to the UGIB, but will start it now. Pt refusing po so ordered metoprolol IV. Increased IVF fluid in case tachycardia is from hypovolemia. Unlikely chest pain is cardiogenic in nature, and more likely epigastric from the pud. Pt agitated so the only IV antipsychotic available is haldol at this time. QTC wnl. Discussed the risk of clots with afib versus the risk of bleeding if she is on anticoagulation which wouldn't even be feasible at this time since she is actively bleeding. Discussed it with her daughter who declined anticoagulation even once this UGIB resolves. Hb this am was 8.2 which is holding steady post 1 unit prbc transfusion. Pt has not had a BM in patient.     ADDITIONAL REVIEW OF SYSTEMS: as above    MEDICATIONS  (STANDING):  atorvastatin 10 milliGRAM(s) Oral at bedtime  dorzolamide 2% Ophthalmic Solution 1 Drop(s) Both EYES <User Schedule>  lactated ringers. 1000 milliLiter(s) (60 mL/Hr) IV Continuous <Continuous>  latanoprost 0.005% Ophthalmic Solution 1 Drop(s) Both EYES at bedtime  lidocaine   4% Patch 1 Patch Transdermal every 24 hours  pantoprazole  Injectable 40 milliGRAM(s) IV Push two times a day  polyethylene glycol 3350 17 Gram(s) Oral daily  predniSONE   Tablet 5 milliGRAM(s) Oral daily  senna 2 Tablet(s) Oral at bedtime  timolol 0.5% Solution 1 Drop(s) Both EYES every 12 hours    MEDICATIONS  (PRN):  acetaminophen     Tablet .. 650 milliGRAM(s) Oral every 6 hours PRN Temp greater or equal to 38C (100.4F), Mild Pain (1 - 3)  traMADol 25 milliGRAM(s) Oral every 8 hours PRN Severe Pain (7 - 10)      CAPILLARY BLOOD GLUCOSE        I&O's Summary      PHYSICAL EXAM:  Vital Signs Last 24 Hrs  T(C): 36.7 (2023 12:09), Max: 36.7 (2023 18:08)  T(F): 98 (2023 12:09), Max: 98.1 (2023 18:08)  HR: 98 (2023 12:09) (96 - 101)  BP: 105/66 (2023 12:09) (105/66 - 119/60)  BP(mean): --  RR: 18 (2023 12:09) (16 - 18)  SpO2: 100% (2023 03:15) (98% - 100%)      CONSTITUTIONAL: NAD, elderly    NECK: Supple, no palpable masses; no thyromegaly  RESPIRATORY: Normal respiratory effort; lungs are clear to auscultation bilaterally  CARDIOVASCULAR: Regular rate and rhythm, normal S1 and S2, no murmur/rub/gallop; No lower extremity edema; Peripheral pulses are 2+ bilaterally  ABDOMEN: hypogastrium tednerness, normoactive bowel sounds, no rebound/guarding; No hepatosplenomegaly  MUSCULOSKELETAL:  did not assess gait no clubbing or cyanosis of digits; no joint swelling or tenderness to palpation  PSYCH: A+O x 0-1 (her name)  SKIN: No rashes; no palpable lesions    LABS:                        7.4    16.43 )-----------( 194      ( 2023 12:55 )             23.0     06-29    141  |  107  |  56<H>  ----------------------------<  150<H>  4.7   |  20  |  0.7    Ca    8.9      2023 19:39    TPro  5.7<L>  /  Alb  3.6  /  TBili  0.7  /  DBili  x   /  AST  13  /  ALT  13  /  AlkPhos  61  06-29    PT/INR - ( 2023 22:09 )   PT: 13.30 sec;   INR: 1.16 ratio         PTT - ( 2023 22:09 )  PTT:16.6 sec  CARDIAC MARKERS ( 2023 19:39 )  x     / <0.01 ng/mL / x     / x     / x          Urinalysis Basic - ( 2023 23:29 )    Color: Light Yellow / Appearance: Clear / S.022 / pH: x  Gluc: x / Ketone: Negative  / Bili: Negative / Urobili: <2 mg/dL   Blood: x / Protein: Trace / Nitrite: Negative   Leuk Esterase: Negative / RBC: x / WBC x   Sq Epi: x / Non Sq Epi: x / Bacteria: x          RADIOLOGY & ADDITIONAL TESTS:  Results Reviewed:   Imaging Personally Reviewed:  Electrocardiogram Personally Reviewed:    COORDINATION OF CARE:  Care Discussed with Consultants/Other Providers [Y/N]:  Prior or Outpatient Records Reviewed [Y/N]:

## 2023-07-01 NOTE — CHART NOTE - NSCHARTNOTEFT_GEN_A_CORE
Spoke with patient's daughter Joanna at bedside. She was inquiring about patient's hemoglobin trending down to 7.8 this evening. Ordered a CBC for 11 pm. Also, daughter states that she is amenable to GI doing a colonoscopy or endoscopy if indicated. She states that she has signed papers for it already.

## 2023-07-02 LAB
ALBUMIN SERPL ELPH-MCNC: 3.4 G/DL — LOW (ref 3.5–5.2)
ALP SERPL-CCNC: 59 U/L — SIGNIFICANT CHANGE UP (ref 30–115)
ALT FLD-CCNC: 17 U/L — SIGNIFICANT CHANGE UP (ref 0–41)
ANION GAP SERPL CALC-SCNC: 12 MMOL/L — SIGNIFICANT CHANGE UP (ref 7–14)
AST SERPL-CCNC: 29 U/L — SIGNIFICANT CHANGE UP (ref 0–41)
BASOPHILS # BLD AUTO: 0.05 K/UL — SIGNIFICANT CHANGE UP (ref 0–0.2)
BASOPHILS NFR BLD AUTO: 0.5 % — SIGNIFICANT CHANGE UP (ref 0–1)
BILIRUB SERPL-MCNC: 1.3 MG/DL — HIGH (ref 0.2–1.2)
BLD GP AB SCN SERPL QL: SIGNIFICANT CHANGE UP
BUN SERPL-MCNC: 17 MG/DL — SIGNIFICANT CHANGE UP (ref 10–20)
CALCIUM SERPL-MCNC: 8.7 MG/DL — SIGNIFICANT CHANGE UP (ref 8.4–10.5)
CHLORIDE SERPL-SCNC: 106 MMOL/L — SIGNIFICANT CHANGE UP (ref 98–110)
CO2 SERPL-SCNC: 24 MMOL/L — SIGNIFICANT CHANGE UP (ref 17–32)
CREAT SERPL-MCNC: 0.7 MG/DL — SIGNIFICANT CHANGE UP (ref 0.7–1.5)
EGFR: 81 ML/MIN/1.73M2 — SIGNIFICANT CHANGE UP
EOSINOPHIL # BLD AUTO: 0.23 K/UL — SIGNIFICANT CHANGE UP (ref 0–0.7)
EOSINOPHIL NFR BLD AUTO: 2.5 % — SIGNIFICANT CHANGE UP (ref 0–8)
GLUCOSE SERPL-MCNC: 124 MG/DL — HIGH (ref 70–99)
HCT VFR BLD CALC: 26.3 % — LOW (ref 37–47)
HGB BLD-MCNC: 8.6 G/DL — LOW (ref 12–16)
IMM GRANULOCYTES NFR BLD AUTO: 0.5 % — HIGH (ref 0.1–0.3)
LYMPHOCYTES # BLD AUTO: 1.06 K/UL — LOW (ref 1.2–3.4)
LYMPHOCYTES # BLD AUTO: 11.6 % — LOW (ref 20.5–51.1)
MAGNESIUM SERPL-MCNC: 2.1 MG/DL — SIGNIFICANT CHANGE UP (ref 1.8–2.4)
MCHC RBC-ENTMCNC: 30.5 PG — SIGNIFICANT CHANGE UP (ref 27–31)
MCHC RBC-ENTMCNC: 32.7 G/DL — SIGNIFICANT CHANGE UP (ref 32–37)
MCV RBC AUTO: 93.3 FL — SIGNIFICANT CHANGE UP (ref 81–99)
MONOCYTES # BLD AUTO: 0.71 K/UL — HIGH (ref 0.1–0.6)
MONOCYTES NFR BLD AUTO: 7.8 % — SIGNIFICANT CHANGE UP (ref 1.7–9.3)
NEUTROPHILS # BLD AUTO: 7 K/UL — HIGH (ref 1.4–6.5)
NEUTROPHILS NFR BLD AUTO: 77.1 % — HIGH (ref 42.2–75.2)
NRBC # BLD: 0 /100 WBCS — SIGNIFICANT CHANGE UP (ref 0–0)
PLATELET # BLD AUTO: 152 K/UL — SIGNIFICANT CHANGE UP (ref 130–400)
PMV BLD: 10.3 FL — SIGNIFICANT CHANGE UP (ref 7.4–10.4)
POTASSIUM SERPL-MCNC: 3.6 MMOL/L — SIGNIFICANT CHANGE UP (ref 3.5–5)
POTASSIUM SERPL-SCNC: 3.6 MMOL/L — SIGNIFICANT CHANGE UP (ref 3.5–5)
PROT SERPL-MCNC: 5.3 G/DL — LOW (ref 6–8)
RBC # BLD: 2.82 M/UL — LOW (ref 4.2–5.4)
RBC # FLD: 14.9 % — HIGH (ref 11.5–14.5)
SODIUM SERPL-SCNC: 142 MMOL/L — SIGNIFICANT CHANGE UP (ref 135–146)
WBC # BLD: 9.1 K/UL — SIGNIFICANT CHANGE UP (ref 4.8–10.8)
WBC # FLD AUTO: 9.1 K/UL — SIGNIFICANT CHANGE UP (ref 4.8–10.8)

## 2023-07-02 PROCEDURE — 99233 SBSQ HOSP IP/OBS HIGH 50: CPT

## 2023-07-02 PROCEDURE — 93010 ELECTROCARDIOGRAM REPORT: CPT

## 2023-07-02 RX ORDER — METOPROLOL TARTRATE 50 MG
5 TABLET ORAL ONCE
Refills: 0 | Status: COMPLETED | OUTPATIENT
Start: 2023-07-02 | End: 2023-07-02

## 2023-07-02 RX ORDER — PANTOPRAZOLE SODIUM 20 MG/1
40 TABLET, DELAYED RELEASE ORAL
Refills: 0 | Status: DISCONTINUED | OUTPATIENT
Start: 2023-07-02 | End: 2023-07-06

## 2023-07-02 RX ORDER — CARVEDILOL PHOSPHATE 80 MG/1
3.12 CAPSULE, EXTENDED RELEASE ORAL EVERY 12 HOURS
Refills: 0 | Status: DISCONTINUED | OUTPATIENT
Start: 2023-07-02 | End: 2023-07-03

## 2023-07-02 RX ADMIN — Medication 1 DROP(S): at 22:07

## 2023-07-02 RX ADMIN — MORPHINE SULFATE 1 MILLIGRAM(S): 50 CAPSULE, EXTENDED RELEASE ORAL at 22:38

## 2023-07-02 RX ADMIN — MORPHINE SULFATE 1 MILLIGRAM(S): 50 CAPSULE, EXTENDED RELEASE ORAL at 14:35

## 2023-07-02 RX ADMIN — LIDOCAINE 1 PATCH: 4 CREAM TOPICAL at 22:06

## 2023-07-02 RX ADMIN — Medication 5 MILLIGRAM(S): at 21:20

## 2023-07-02 RX ADMIN — PANTOPRAZOLE SODIUM 40 MILLIGRAM(S): 20 TABLET, DELAYED RELEASE ORAL at 18:38

## 2023-07-02 RX ADMIN — MORPHINE SULFATE 1 MILLIGRAM(S): 50 CAPSULE, EXTENDED RELEASE ORAL at 14:55

## 2023-07-02 RX ADMIN — MORPHINE SULFATE 1 MILLIGRAM(S): 50 CAPSULE, EXTENDED RELEASE ORAL at 08:55

## 2023-07-02 RX ADMIN — MORPHINE SULFATE 1 MILLIGRAM(S): 50 CAPSULE, EXTENDED RELEASE ORAL at 08:38

## 2023-07-02 RX ADMIN — MORPHINE SULFATE 1 MILLIGRAM(S): 50 CAPSULE, EXTENDED RELEASE ORAL at 23:38

## 2023-07-02 RX ADMIN — DORZOLAMIDE HYDROCHLORIDE 1 DROP(S): 20 SOLUTION/ DROPS OPHTHALMIC at 22:07

## 2023-07-02 RX ADMIN — LATANOPROST 1 DROP(S): 0.05 SOLUTION/ DROPS OPHTHALMIC; TOPICAL at 22:08

## 2023-07-02 NOTE — PROGRESS NOTE ADULT - SUBJECTIVE AND OBJECTIVE BOX
CHIEF COMPLAINT:    Patient is a 89y old  Female who presents with a chief complaint of Melena    INTERVAL HPI/OVERNIGHT EVENTS:    Patient seen and examined at bedside. No acute overnight events occurred.    ROS: Unable to obtain    Medications:  Standing  carvedilol 3.125 milliGRAM(s) Oral every 12 hours  dorzolamide 2% Ophthalmic Solution 1 Drop(s) Both EYES <User Schedule>  latanoprost 0.005% Ophthalmic Solution 1 Drop(s) Both EYES at bedtime  lidocaine   4% Patch 1 Patch Transdermal every 24 hours  pantoprazole  Injectable 40 milliGRAM(s) IV Push two times a day  polyethylene glycol 3350 17 Gram(s) Oral daily  predniSONE   Tablet 5 milliGRAM(s) Oral daily  senna 2 Tablet(s) Oral at bedtime  timolol 0.5% Solution 1 Drop(s) Both EYES every 12 hours    PRN Meds  acetaminophen     Tablet .. 650 milliGRAM(s) Oral every 6 hours PRN  morphine  - Injectable 1 milliGRAM(s) IV Push every 6 hours PRN        Vital Signs:    T(F): 97.9 (07-02-23 @ 13:32), Max: 98.5 (07-01-23 @ 20:55)  HR: 109 (07-02-23 @ 13:32) (75 - 116)  BP: 141/69 (07-02-23 @ 13:32) (133/81 - 141/69)  RR: 18 (07-02-23 @ 13:32) (18 - 18)  SpO2: 98% (07-01-23 @ 20:55) (98% - 98%)  I&O's Summary    02 Jul 2023 07:01  -  02 Jul 2023 14:59  --------------------------------------------------------  IN: 1120 mL / OUT: 600 mL / NET: 520 mL      Daily     Daily   CAPILLARY BLOOD GLUCOSE      POCT Blood Glucose.: 162 mg/dL (01 Jul 2023 21:22)      PHYSICAL EXAM:  GENERAL:  NAD  SKIN: No rashes or lesions  HEENT: Atraumatic. Normocephalic. Anicteric  NECK:  No JVD.   PULMONARY: Clear to ausculation bilaterally. No wheezing. No rales  CVS: Normal S1, S2. Regular rate and rhythm. No murmurs.  ABDOMEN/GI: Soft, Nontender, Nondistended; Bowel sounds are present  EXTREMITIES:  No edema B/L LE.  NEUROLOGIC:  non particiaptory  PSYCH: calm      LABS:                        8.6    9.10  )-----------( 152      ( 02 Jul 2023 06:44 )             26.3     07-02    142  |  106  |  17  ----------------------------<  124<H>  3.6   |  24  |  0.7    Ca    8.7      02 Jul 2023 06:44  Mg     2.1     07-02    TPro  5.3<L>  /  Alb  3.4<L>  /  TBili  1.3<H>  /  DBili  x   /  AST  29  /  ALT  17  /  AlkPhos  59  07-02        Trop <0.01, CKMB --, CK --, 06-29-23 @ 19:39      Culture - Urine (collected 29 Jun 2023 23:29)  Source: Clean Catch Clean Catch (Midstream)  Final Report (01 Jul 2023 08:28):    <10,000 CFU/mL Normal Urogenital Mireya        RADIOLOGY & ADDITIONAL TESTS:  Imaging or report Personally Reviewed:  [ ] YES  [ ] NO -->no new images    Telemetry reviewed independently - NSR, no acute events  EKG reviewed independently -->no new EKGs    Consultant(s) Notes Reviewed:  [ ] YES  [ ] NO  Care Discussed with Consultants/Other Providers [ ] YES  [ ] NO    Case discussed with resident  Care discussed with pt

## 2023-07-02 NOTE — PROGRESS NOTE ADULT - ASSESSMENT
89 yo F PMH of HTN, HLD, CAD, and advanced dementia presented for evaluation of in the setting of NSAID and steroid use.      UGIB presenting with melena and anemia  Likely due to possible PUD   - Labs show a leukocytosis of 14k, Hb of 9.4 from a baseline of 12g/dl, lactate 2.9, BUN/Cr 56/0.7   - pt has been taking meloxicam since her pelvic fracture about 2 weeks in early 6/2023  - has been on chronic prednisone 5mg for 3 years  - does not take a ppi at home and has been having epigastric pain the past 2 weeks  - denies use of pepto bismol and iron tablets  - no previous cscope or EGD; no FH of CRC  - CTAP: no active bleeding noted  - 2 large bore IVs  - PPI 40mg bid switched to PPI drip, changed back to BID dose  - resumed metoprolol but started with 5mg metoprolol IVP as pt refusing po  - received IVF @ 150cc/hr but held for now  - last LA from 7/1 was normal aat 1.8  - repeat lactate q8hrs until normalized  - hgb stable, diet advanced  - GI recs appreciated  - daughter is open to egd/colonoscopy if necesarry but since hgb now stable wants to hold off  - transfuse if Hb <8 due to CAD  - hold aspirin  - hold NSAIDs  - SCDs  - bleeding and fall precautions  - received 1 unit prbc on 6/30    Afib with rvr, new vs paroxysmal  - coreg started, tachycardia doesn't appear to be related to bleed as LA and hgb stable  - will not initiate anticoagulation after team had discussion of risks and benefits with daughter who understood the risks of not initiating anticoagulation  - CHADSVASC 4 indicating a 4.8% risk of stroke vs HAS BLED of 3 points indicating a 5.8% risk of bleeding     Chronic prednisone use for sob?  - pt has been on prednisone for 3 years  - was on 10mg until she was tapered to 5mg recently  - follows op with Dr. Sousa    AAA  - pt noted to have a 3.9 AAA that has increased by 0.4 since 1 year ago  - given its size and rate of growth, will need op surveillance with an US  - avoid HTN    Chronic L2 compression fracture  - abdominal binder  - TLSO brace on discharge    AD  HTN  HLD  - unclear history of whether it was ischemic cardiomyopathy; no stents; no CHF  - on aspirin, statin and metoprolol at home  - hold oral meds for now    Pelvic fracture following fall  - treat pain with lidocaine patch and tramadol  - avoid NSAIDs    Glaucoma  - c/w timolol, dorzolamide, latanoprost    Advanced dementia  - not on any medications  - redirection  - daily orientation  - keep shades to window open  - haldol for agitation  - once pt is able to take po, can do seroquel or trazodone    #proph  - vte: SCD  - gi: miralax and ppi  - fall and bleeding precautions  - CODE status: DNR/DNI as determined by her daughter who is her hcp as pt's advanced dementia precludes her from making an informed decision on her own    #Progress Note Handoff:  Pending (specify):  rate control  Family discussion: as above with daughter  Disposition: Home_x__/SNF___/Other________/Unknown at this time________

## 2023-07-02 NOTE — DIETITIAN INITIAL EVALUATION ADULT - ORAL INTAKE PTA/DIET HISTORY
no previous admits seen by RD, patient confused at baseline disoriented x4 unable to obtain subjective information. no family at bedside at time of visit   presented with abd pain & weakness

## 2023-07-02 NOTE — DIETITIAN INITIAL EVALUATION ADULT - PERTINENT MEDS FT
MEDICATIONS  (STANDING):  carvedilol 3.125 milliGRAM(s) Oral every 12 hours  dorzolamide 2% Ophthalmic Solution 1 Drop(s) Both EYES <User Schedule>  latanoprost 0.005% Ophthalmic Solution 1 Drop(s) Both EYES at bedtime  lidocaine   4% Patch 1 Patch Transdermal every 24 hours  pantoprazole  Injectable 40 milliGRAM(s) IV Push two times a day  polyethylene glycol 3350 17 Gram(s) Oral daily  predniSONE   Tablet 5 milliGRAM(s) Oral daily  senna 2 Tablet(s) Oral at bedtime  timolol 0.5% Solution 1 Drop(s) Both EYES every 12 hours    MEDICATIONS  (PRN):  acetaminophen     Tablet .. 650 milliGRAM(s) Oral every 6 hours PRN Temp greater or equal to 38C (100.4F), Mild Pain (1 - 3)  morphine  - Injectable 1 milliGRAM(s) IV Push every 6 hours PRN Severe Pain (7 - 10)

## 2023-07-02 NOTE — DIETITIAN INITIAL EVALUATION ADULT - NSFNSGIIOFT_GEN_A_CORE
^ weight 6/29/23  IBW 50kg    previous admission weights (kg):  63.5 (06-21-23 @ 10:52), 70.9 (06-15-22 @ 11:13)  * this is 8.4% weight loss over the last few weeks which is clinically significant

## 2023-07-02 NOTE — DIETITIAN NUTRITION RISK NOTIFICATION - TREATMENT: THE FOLLOWING DIET HAS BEEN RECOMMENDED
currently on soft and bite size, which is dysphagia diet. would suggest to consult SLP services to determine safest/least restrictive diet  and ADD low fiber modifier  Ensure HIGH PROTEIN 2x/day (350kcal, 20 g pro/serving) for now between meals

## 2023-07-02 NOTE — DIETITIAN INITIAL EVALUATION ADULT - CONTINUE CURRENT NUTRITION CARE PLAN
currently on soft and bite size, would suggest to consult SLP services to determine safest/least restrictive diet currently on soft and bite size, which is dysphagia diet. would suggest to consult SLP services to determine safest/least restrictive diet  add low fiber modifier

## 2023-07-02 NOTE — DIETITIAN INITIAL EVALUATION ADULT - PERTINENT LABORATORY DATA
Name band;
07-02    142  |  106  |  17  ----------------------------<  124<H>  3.6   |  24  |  0.7    Ca    8.7      02 Jul 2023 06:44  Mg     2.1     07-02    TPro  5.3<L>  /  Alb  3.4<L>  /  TBili  1.3<H>  /  DBili  x   /  AST  29  /  ALT  17  /  AlkPhos  59  07-02  POCT Blood Glucose.: 162 mg/dL (07-01-23 @ 21:22)

## 2023-07-02 NOTE — DIETITIAN INITIAL EVALUATION ADULT - OTHER CALCULATIONS
using 64.9kg with consideration for age, wt, BMI, acute malnutrition   ENERGY: 8642-1405 kcal/day (MSJ x 1.2-1.3)  PROTEIN: 78-84g/day (1.2-1.3g/kg)  FLUIDS: 1mL/kcal

## 2023-07-02 NOTE — PROVIDER CONTACT NOTE (CHANGE IN STATUS NOTIFICATION) - ASSESSMENT
pt A&O x 1 at baseline. appeared agitated, pulling leads and trying to get OOB. With sitter at bedside. Tachycardic to 150s-160s. unable to tolerate PO meds

## 2023-07-02 NOTE — DIETITIAN INITIAL EVALUATION ADULT - ENERGY INTAKE
has been NPO since admission per team due to AMS, x3days total meeting <50% estimated energy needs   **update, patient was just advanced to soft and bite size diet though has not had meal yet today has been NPO since admission per team per GI team x3days total meeting <50% estimated energy needs   **update, patient was just advanced to soft and bite size diet though has not had meal yet today

## 2023-07-02 NOTE — DIETITIAN INITIAL EVALUATION ADULT - ADD RECOMMEND
bowel regimen to promote regular bowel movements    Patient at HIGH nutrition risk   will follow up within 3days  [place] Zach, RD  [phone] or via TEAMS

## 2023-07-02 NOTE — PROVIDER CONTACT NOTE (CHANGE IN STATUS NOTIFICATION) - SITUATION
Pt confused, appeared agitated. HR in 150s-160s. BP in 170s. daughter at bedside trying to reorient pt.

## 2023-07-03 LAB
ALBUMIN SERPL ELPH-MCNC: 3.4 G/DL — LOW (ref 3.5–5.2)
ALP SERPL-CCNC: 57 U/L — SIGNIFICANT CHANGE UP (ref 30–115)
ALT FLD-CCNC: 17 U/L — SIGNIFICANT CHANGE UP (ref 0–41)
ANION GAP SERPL CALC-SCNC: 14 MMOL/L — SIGNIFICANT CHANGE UP (ref 7–14)
AST SERPL-CCNC: 28 U/L — SIGNIFICANT CHANGE UP (ref 0–41)
BASOPHILS # BLD AUTO: 0.08 K/UL — SIGNIFICANT CHANGE UP (ref 0–0.2)
BASOPHILS NFR BLD AUTO: 0.9 % — SIGNIFICANT CHANGE UP (ref 0–1)
BILIRUB SERPL-MCNC: 1.3 MG/DL — HIGH (ref 0.2–1.2)
BUN SERPL-MCNC: 17 MG/DL — SIGNIFICANT CHANGE UP (ref 10–20)
CALCIUM SERPL-MCNC: 8.6 MG/DL — SIGNIFICANT CHANGE UP (ref 8.4–10.4)
CHLORIDE SERPL-SCNC: 106 MMOL/L — SIGNIFICANT CHANGE UP (ref 98–110)
CO2 SERPL-SCNC: 23 MMOL/L — SIGNIFICANT CHANGE UP (ref 17–32)
CREAT SERPL-MCNC: 0.8 MG/DL — SIGNIFICANT CHANGE UP (ref 0.7–1.5)
EGFR: 69 ML/MIN/1.73M2 — SIGNIFICANT CHANGE UP
EOSINOPHIL # BLD AUTO: 0.26 K/UL — SIGNIFICANT CHANGE UP (ref 0–0.7)
EOSINOPHIL NFR BLD AUTO: 2.8 % — SIGNIFICANT CHANGE UP (ref 0–8)
GLUCOSE SERPL-MCNC: 125 MG/DL — HIGH (ref 70–99)
HCT VFR BLD CALC: 26.5 % — LOW (ref 37–47)
HGB BLD-MCNC: 8.8 G/DL — LOW (ref 12–16)
IMM GRANULOCYTES NFR BLD AUTO: 0.5 % — HIGH (ref 0.1–0.3)
LYMPHOCYTES # BLD AUTO: 1.01 K/UL — LOW (ref 1.2–3.4)
LYMPHOCYTES # BLD AUTO: 11 % — LOW (ref 20.5–51.1)
MAGNESIUM SERPL-MCNC: 1.9 MG/DL — SIGNIFICANT CHANGE UP (ref 1.8–2.4)
MCHC RBC-ENTMCNC: 31.3 PG — HIGH (ref 27–31)
MCHC RBC-ENTMCNC: 33.2 G/DL — SIGNIFICANT CHANGE UP (ref 32–37)
MCV RBC AUTO: 94.3 FL — SIGNIFICANT CHANGE UP (ref 81–99)
MONOCYTES # BLD AUTO: 0.86 K/UL — HIGH (ref 0.1–0.6)
MONOCYTES NFR BLD AUTO: 9.4 % — HIGH (ref 1.7–9.3)
NEUTROPHILS # BLD AUTO: 6.9 K/UL — HIGH (ref 1.4–6.5)
NEUTROPHILS NFR BLD AUTO: 75.4 % — HIGH (ref 42.2–75.2)
NRBC # BLD: 0 /100 WBCS — SIGNIFICANT CHANGE UP (ref 0–0)
PLATELET # BLD AUTO: 155 K/UL — SIGNIFICANT CHANGE UP (ref 130–400)
PMV BLD: 10.2 FL — SIGNIFICANT CHANGE UP (ref 7.4–10.4)
POTASSIUM SERPL-MCNC: 3.5 MMOL/L — SIGNIFICANT CHANGE UP (ref 3.5–5)
POTASSIUM SERPL-SCNC: 3.5 MMOL/L — SIGNIFICANT CHANGE UP (ref 3.5–5)
PROT SERPL-MCNC: 5.3 G/DL — LOW (ref 6–8)
RBC # BLD: 2.81 M/UL — LOW (ref 4.2–5.4)
RBC # FLD: 14.9 % — HIGH (ref 11.5–14.5)
SODIUM SERPL-SCNC: 143 MMOL/L — SIGNIFICANT CHANGE UP (ref 135–146)
WBC # BLD: 9.16 K/UL — SIGNIFICANT CHANGE UP (ref 4.8–10.8)
WBC # FLD AUTO: 9.16 K/UL — SIGNIFICANT CHANGE UP (ref 4.8–10.8)

## 2023-07-03 PROCEDURE — 99232 SBSQ HOSP IP/OBS MODERATE 35: CPT

## 2023-07-03 RX ORDER — METOPROLOL TARTRATE 50 MG
5 TABLET ORAL ONCE
Refills: 0 | Status: DISCONTINUED | OUTPATIENT
Start: 2023-07-03 | End: 2023-07-03

## 2023-07-03 RX ORDER — METOPROLOL TARTRATE 50 MG
5 TABLET ORAL ONCE
Refills: 0 | Status: COMPLETED | OUTPATIENT
Start: 2023-07-03 | End: 2023-07-03

## 2023-07-03 RX ORDER — METOPROLOL TARTRATE 50 MG
5 TABLET ORAL EVERY 6 HOURS
Refills: 0 | Status: DISCONTINUED | OUTPATIENT
Start: 2023-07-03 | End: 2023-07-03

## 2023-07-03 RX ORDER — METOPROLOL TARTRATE 50 MG
25 TABLET ORAL DAILY
Refills: 0 | Status: DISCONTINUED | OUTPATIENT
Start: 2023-07-03 | End: 2023-07-03

## 2023-07-03 RX ORDER — METOPROLOL TARTRATE 50 MG
5 TABLET ORAL EVERY 6 HOURS
Refills: 0 | Status: DISCONTINUED | OUTPATIENT
Start: 2023-07-03 | End: 2023-07-04

## 2023-07-03 RX ADMIN — LIDOCAINE 1 PATCH: 4 CREAM TOPICAL at 18:02

## 2023-07-03 RX ADMIN — PANTOPRAZOLE SODIUM 40 MILLIGRAM(S): 20 TABLET, DELAYED RELEASE ORAL at 17:56

## 2023-07-03 RX ADMIN — DORZOLAMIDE HYDROCHLORIDE 1 DROP(S): 20 SOLUTION/ DROPS OPHTHALMIC at 05:13

## 2023-07-03 RX ADMIN — LIDOCAINE 1 PATCH: 4 CREAM TOPICAL at 10:38

## 2023-07-03 RX ADMIN — Medication 1 DROP(S): at 05:13

## 2023-07-03 RX ADMIN — LIDOCAINE 1 PATCH: 4 CREAM TOPICAL at 19:48

## 2023-07-03 RX ADMIN — Medication 5 MILLIGRAM(S): at 02:09

## 2023-07-03 RX ADMIN — PANTOPRAZOLE SODIUM 40 MILLIGRAM(S): 20 TABLET, DELAYED RELEASE ORAL at 05:13

## 2023-07-03 RX ADMIN — LIDOCAINE 1 PATCH: 4 CREAM TOPICAL at 12:17

## 2023-07-03 RX ADMIN — Medication 1 DROP(S): at 17:56

## 2023-07-03 RX ADMIN — LATANOPROST 1 DROP(S): 0.05 SOLUTION/ DROPS OPHTHALMIC; TOPICAL at 22:00

## 2023-07-03 RX ADMIN — DORZOLAMIDE HYDROCHLORIDE 1 DROP(S): 20 SOLUTION/ DROPS OPHTHALMIC at 17:56

## 2023-07-03 RX ADMIN — LIDOCAINE 1 PATCH: 4 CREAM TOPICAL at 05:27

## 2023-07-03 RX ADMIN — Medication 5 MILLIGRAM(S): at 17:56

## 2023-07-03 NOTE — CHART NOTE - NSCHARTNOTEFT_GEN_A_CORE
Patient admitted for UGIB, currently being treated for suspected ulcer  Patient has h/o Afib  Discussed with Daughter at bedside regarding risk vs benefits of starting anticoagulation  Given age and risk of bleeding from the GI tract, daughter wishes to hold off on AC at this time #Suspected UGIB  #Chronic A Fib, not on AC  - Patient admitted for UGIB, currently being treated for suspected ulcer  - Patient has h/o Afib  - Hgb has been stable  - Discussed with Daughter at bedside regarding risk vs benefits of starting anticoagulation  - Given age and risk of bleeding from the GI tract, daughter wishes to hold off on AC at this time  - family also against invasive procedures unless absolutely necessary  - patient also not willing to take PO meds  - Coreg and PO metoprolol d/tram  - Starting Metoprolol 5mg IVP q6hrs standing for HR  - GI following

## 2023-07-03 NOTE — CONSULT NOTE ADULT - ASSESSMENT
Assessment and Plan  Case of a 89 yo F with a PMH of HTN, HLD, CAD, Dementia(A&O1 at baseline) present for weakness and abdominal pain that started this morning. Daughter reports dark stool since yesterday and lower abdominal pain. history was obtained form the daughter at bedside. Daughter denies any vomiting, hematochezia, hematemesis or weight loss. Patient is hemodynamically stable, Hemoglobin on admission is 9.4 (baseline 10-11). labs significant for elevated lactate and leukocytosis. GI consulted to r/o melena.      PLAN:   - After discussion with Patient's daughter, she prefers to avoid any endoscopy or anesthesia requiring procedure.  - Risk outweighs the benefit in absence of active bleeding  - Will recommend to continue with conservative measures for now  - Monitor BM  - Monitor CBC, transfuse to keep hemoglobin > 8. Avoid NSAIDs.   - Continue PPI IV BID  - Continue soft bite sized diet      Elevated T bilirubin  * T bili 0.7 on 06/29 -> 07/02 and 07/03 1.3  * ALP 59, AST 29, aLT 17 07/02  * CT AP IC 06/30 noted with no hepatobiliary findings     PLAN  - Trend LFTs for now  - Consider RUQ US if LFTs worsen  - Avoid hepatotoxic agents      Thank you for your consult.  - Please note that plan was communicated with medical team.   - Please reach GI on [phone] during weekdays till 5pm.  - Please call the GI service line after 5pm on Weekdays and anytime on Weekends: [phone].      Tayo Funes MD  PGY - 4 Gastroenterology Fellow   [facility]      
89yo F with a PMH of HTN, HLD, CAD, Dementia(A&O1 at baseline) present for weakness and abdominal pain that started this morning. Daughter reports dark stool since yesterday and lower abdominal pain. history was obtained form the daughter at bedside. daughter denies any vomiting, hematochezia, hematemesis or weight loss. Patient is hemodynamically stable, Hemoglobin on admission is 9.4 (baseline 10-11). labs significant for elevated lactate and leukocytosis. GI consulted to r/o melena.      PLAN:   Brown stool was noted in am. Discussed with Patients daughter who wants to avoid any endoscopy or anesthesia requiring procedure. Risk outweighs the benefit in absence of active bleeding. Will recommend to c/w conservative measures for now.  Keep NPO. advance diet tomorrow if no signs of bleeding. PPI IV BID. Monitor CBC, transfuse to keep hemoglobin > 8. Avoid NSAIDs.

## 2023-07-03 NOTE — CONSULT NOTE ADULT - SUBJECTIVE AND OBJECTIVE BOX
Gastroenterology Follow Up Note    Location: [facility] 3C (Back) 015 [address] ([facility] 3C (Back))  Patient Name: KIN CANSECO  Age: 89y  Gender: Female      Progress Note  We are following the patient for concern of melena.  This morning patient was seen and examined at bedside.    Today is hospital day 3d.      Interval History:   Ms. Canseco is doing fine.   She went into new onset afib with RVR overnight.  She has had no blood per rectum per RN.  She seems comfortable.      Prior EGD:  no prior      Prior Colonoscopy:  no prior      Past Medical and Surgical History:  CAD (coronary artery disease)  HTN (hypertension)      Home Medications:  Home Medications:  acetaminophen 325 mg oral tablet: 2 tab(s) orally every 6 hours (21 Jun 2022 12:32)  atorvastatin 10 mg tablet:  (14 Jun 2022 01:26)  brimonidine 0.15 % eye drops: 1 milliliter(s) to each affected eye 3 times a day (21 Jun 2022 12:32)  furosemide 20 mg tablet:  (14 Jun 2022 01:26)  gabapentin 250 mg/5 mL oral solution: 2 milliliter(s) orally 3 times a day (21 Jun 2022 12:32)  latanoprost 0.005 % eye drops: 1 milliliter(s) to each affected eye once a day (at bedtime) (21 Jun 2022 12:32)  METOPROLOL SUCC ER 25 MG TAB: 1 tab(s) orally once a day (21 Jun 2022 12:32)  PREDNISONE 5 MG TABLET: 1 tab(s) orally once a day (21 Jun 2022 12:32)  valsartan 80 mg tablet:  (14 Jun 2022 01:26)        Vital Signs in the last 24 hours   Vitals Summary T(C): 35.6 (07-03-23 @ 12:59), Max: 36.6 (07-03-23 @ 05:02)  HR: 77 (07-03-23 @ 12:59) (73 - 121)  BP: 112/56 (07-03-23 @ 12:59) (112/56 - 171/88)  RR: 18 (07-03-23 @ 12:59) (18 - 20)  SpO2: --  Vent Data   Intake/ Output   07-02-23 @ 07:01  -  07-03-23 @ 07:00  --------------------------------------------------------  IN: 1487 mL / OUT: 600 mL / NET: 887 mL    07-03-23 @ 07:01  -  07-03-23 @ 19:35  --------------------------------------------------------  IN: 30 mL / OUT: 200 mL / NET: -170 mL      Physical Exam  * General Appearance: AOx1  * Head: Normocephalic, without obvious abnormality, atraumatic  * Lungs: Respirations unlabored, Good bilateral air entry, normal breath sounds   * Heart: irregular Rate and Rhythm  * Abdomen: Symmetric, non-distended, soft, non-tender, bowel sounds active all four quadrants, no masses, no organomegaly (no hepatosplenomegaly)      Investigations   Laboratory Workup      - CBC:                        8.8    9.16  )-----------( 155      ( 03 Jul 2023 05:01 )             26.5       - Hgb Trend:  8.8  07-03-23 @ 05:01  8.6  07-02-23 @ 06:44  7.8  07-01-23 @ 18:09  8.2  07-01-23 @ 08:10  8.6  06-30-23 @ 21:55          - Chemistry:  07-03    143  |  106  |  17  ----------------------------<  125<H>  3.5   |  23  |  0.8    Ca    8.6      03 Jul 2023 05:01  Mg     1.9     07-03    TPro  5.3<L>  /  Alb  3.4<L>  /  TBili  1.3<H>  /  DBili  x   /  AST  28  /  ALT  17  /  AlkPhos  57  07-03    Liver panel trend:  TBili 1.3   /   AST 28   /   ALT 17   /   AlkP 57   /   Tptn 5.3   /   Alb 3.4    /   DBili --      07-03  TBili 1.3   /   AST 29   /   ALT 17   /   AlkP 59   /   Tptn 5.3   /   Alb 3.4    /   DBili --      07-02  TBili 0.8   /   AST 22   /   ALT 14   /   AlkP 54   /   Tptn 5.1   /   Alb 3.2    /   DBili --      07-01  TBili 0.7   /   AST 13   /   ALT 13   /   AlkP 61   /   Tptn 5.7   /   Alb 3.6    /   DBili --      06-29        Microbiological Workup  Urinalysis Basic - ( 03 Jul 2023 05:01 )    Color: x / Appearance: x / SG: x / pH: x  Gluc: 125 mg/dL / Ketone: x  / Bili: x / Urobili: x   Blood: x / Protein: x / Nitrite: x   Leuk Esterase: x / RBC: x / WBC x   Sq Epi: x / Non Sq Epi: x / Bacteria: x        Current Medications  Standing Medications  dorzolamide 2% Ophthalmic Solution 1 Drop(s) Both EYES <User Schedule>  latanoprost 0.005% Ophthalmic Solution 1 Drop(s) Both EYES at bedtime  lidocaine   4% Patch 1 Patch Transdermal every 24 hours  metoprolol tartrate Injectable 5 milliGRAM(s) IV Push every 6 hours  pantoprazole  Injectable 40 milliGRAM(s) IV Push two times a day  polyethylene glycol 3350 17 Gram(s) Oral daily  predniSONE   Tablet 5 milliGRAM(s) Oral daily  senna 2 Tablet(s) Oral at bedtime  timolol 0.5% Solution 1 Drop(s) Both EYES every 12 hours    PRN Medications  acetaminophen     Tablet .. 650 milliGRAM(s) Oral every 6 hours PRN Temp greater or equal to 38C (100.4F), Mild Pain (1 - 3)  morphine  - Injectable 1 milliGRAM(s) IV Push every 6 hours PRN Severe Pain (7 - 10)    Singles Doses Administered  (ADM OVERRIDE) 1 each &lt;see task&gt; GiveOnce  (ADM OVERRIDE) 1 each &lt;see task&gt; GiveOnce  (ADM OVERRIDE) 1 each &lt;see task&gt; GiveOnce  (ADM OVERRIDE) 1 each &lt;see task&gt; GiveOnce  haloperidol    Injectable 1 milliGRAM(s) IntraMuscular once  haloperidol    Injectable 5 milliGRAM(s) IntraMuscular Once  magnesium sulfate  IVPB 2 Gram(s) IV Intermittent once  metoprolol tartrate Injectable 5 milliGRAM(s) IV Push once  metoprolol tartrate Injectable 5 milliGRAM(s) IV Push once  metoprolol tartrate Injectable 5 milliGRAM(s) IV Push once  midazolam Injectable 1 milliGRAM(s) IV Push Once  midazolam Injectable 1 milliGRAM(s) IV Push Once  morphine  - Injectable 1 milliGRAM(s) IV Push once  morphine  - Injectable 4 milliGRAM(s) IV Push Once  pantoprazole  Injectable 40 milliGRAM(s) IV Push Once  sodium chloride 0.9% Bolus 1000 milliLiter(s) IV Bolus once

## 2023-07-03 NOTE — PROGRESS NOTE ADULT - SUBJECTIVE AND OBJECTIVE BOX
CHIEF COMPLAINT:    Patient is a 89y old  Female who presents with a chief complaint of Melena      INTERVAL HPI/OVERNIGHT EVENTS:    Patient seen and examined at bedside. No acute overnight events occurred.    ROS: Unable to obtain.     Medications:  Standing  dorzolamide 2% Ophthalmic Solution 1 Drop(s) Both EYES <User Schedule>  latanoprost 0.005% Ophthalmic Solution 1 Drop(s) Both EYES at bedtime  lidocaine   4% Patch 1 Patch Transdermal every 24 hours  metoprolol tartrate Injectable 5 milliGRAM(s) IV Push every 6 hours  pantoprazole  Injectable 40 milliGRAM(s) IV Push two times a day  polyethylene glycol 3350 17 Gram(s) Oral daily  predniSONE   Tablet 5 milliGRAM(s) Oral daily  senna 2 Tablet(s) Oral at bedtime  timolol 0.5% Solution 1 Drop(s) Both EYES every 12 hours    PRN Meds  acetaminophen     Tablet .. 650 milliGRAM(s) Oral every 6 hours PRN  morphine  - Injectable 1 milliGRAM(s) IV Push every 6 hours PRN        Vital Signs:    T(F): 96 (07-03-23 @ 12:59), Max: 97.8 (07-03-23 @ 05:02)  HR: 77 (07-03-23 @ 12:59) (73 - 121)  BP: 112/56 (07-03-23 @ 12:59) (112/56 - 171/88)  RR: 18 (07-03-23 @ 12:59) (18 - 20)  SpO2: --  I&O's Summary    02 Jul 2023 07:01  -  03 Jul 2023 07:00  --------------------------------------------------------  IN: 1487 mL / OUT: 600 mL / NET: 887 mL    03 Jul 2023 07:01  -  03 Jul 2023 18:04  --------------------------------------------------------  IN: 30 mL / OUT: 200 mL / NET: -170 mL      PHYSICAL EXAM:  GENERAL:  NAD  SKIN: No rashes or lesions  HEENT: Atraumatic. Normocephalic. Anicteric  NECK:  No JVD.   PULMONARY: Clear to ausculation bilaterally. No wheezing. No rales  CVS: Normal S1, S2. Regular rate and rhythm. No murmurs.  ABDOMEN/GI: Soft, Nontender, Nondistended; Bowel sounds are present  EXTREMITIES:  No edema B/L LE.  NEUROLOGIC:  No motor deficit.  PSYCH: Alert & oriented x 3, normal affect      LABS:                        8.8    9.16  )-----------( 155      ( 03 Jul 2023 05:01 )             26.5     07-03    143  |  106  |  17  ----------------------------<  125<H>  3.5   |  23  |  0.8    Ca    8.6      03 Jul 2023 05:01  Mg     1.9     07-03    TPro  5.3<L>  /  Alb  3.4<L>  /  TBili  1.3<H>  /  DBili  x   /  AST  28  /  ALT  17  /  AlkPhos  57  07-03              RADIOLOGY & ADDITIONAL TESTS:  Imaging or report Personally Reviewed:  [ ] YES  [ ] NO -->no new images    Telemetry reviewed independently - afib rvr intermittently  EKG reviewed independently -->no new EKGs    Consultant(s) Notes Reviewed:  [ ] YES  [ ] NO  Care Discussed with Consultants/Other Providers [ ] YES  [ ] NO    Case discussed with resident  Care discussed with pt

## 2023-07-03 NOTE — PROGRESS NOTE ADULT - ASSESSMENT
89 yo F PMH of HTN, HLD, CAD, and advanced dementia presented for evaluation of in the setting of NSAID and steroid use.      UGIB presenting with melena and anemia  Likely due to possible PUD   - Labs show a leukocytosis of 14k, Hb of 9.4 from a baseline of 12g/dl, lactate 2.9, BUN/Cr 56/0.7   - pt has been taking meloxicam since her pelvic fracture about 2 weeks in early 6/2023  - has been on chronic prednisone 5mg for 3 years  - does not take a ppi at home and has been having epigastric pain the past 2 weeks  - denies use of pepto bismol and iron tablets  - no previous cscope or EGD; no FH of CRC  - CTAP: no active bleeding noted  - 2 large bore IVs  - PPI 40mg bid switched to PPI drip, changed back to BID dose  - resumed metoprolol but started with 5mg metoprolol IVP as pt refusing po  - received IVF @ 150cc/hr but held for now  - last LA from 7/1 was normal aat 1.8  - repeat lactate q8hrs until normalized  - hgb stable, diet advanced  - GI recs appreciated  - daughter is open to egd/colonoscopy if necesarry but since hgb now stable wants to hold off  - transfuse if Hb <8 due to CAD  - hold aspirin  - hold NSAIDs  - SCDs  - bleeding and fall precautions  - received 1 unit prbc on 6/30    Afib with rvr, new vs paroxysmal  - coreg started, tachycardia doesn't appear to be related to bleed as LA and hgb stable  - will not initiate anticoagulation after team had discussion of risks and benefits with daughter who understood the risks of not initiating anticoagulation  - CHADSVASC 4 indicating a 4.8% risk of stroke vs HAS BLED of 3 points indicating a 5.8% risk of bleeding   - pt not taking PO, IV metoprolol standing started    Decreased POintake  - likely due to delirium in setting of advanced dementia  - pt's NPO status only reversed as of yesterday  - if no improvement by tomorrow will need to discuss NGT/PEG tube (pt tugging at all lines and taking clothes off, doubt NGT/PEG tube will be of use as pt likely to remove)    Chronic prednisone use for sob?  - pt has been on prednisone for 3 years  - was on 10mg until she was tapered to 5mg recently  - follows op with Dr. Sousa    AAA  - pt noted to have a 3.9 AAA that has increased by 0.4 since 1 year ago  - given its size and rate of growth, will need op surveillance with an US  - avoid HTN    Chronic L2 compression fracture  - abdominal binder  - TLSO brace on discharge    AD  HTN  HLD  - unclear history of whether it was ischemic cardiomyopathy; no stents; no CHF  - on aspirin, statin and metoprolol at home  - hold oral meds for now    Pelvic fracture following fall  - treat pain with lidocaine patch and tramadol  - avoid NSAIDs  - pt follows with Dr. Coyne, family requesting eval as pt grimacing.     Glaucoma  - c/w timolol, dorzolamide, latanoprost    Advanced dementia  - not on any medications  - redirection  - daily orientation  - keep shades to window open  - haldol for agitation  - once pt is able to take po, can do seroquel or trazodone    #proph  - vte: SCD  - gi: miralax and ppi  - fall and bleeding precautions  - CODE status: DNR/DNI as determined by her daughter who is her hcp as pt's advanced dementia precludes her from making an informed decision on her own    #Progress Note Handoff:  Pending (specify):  rate control  Family discussion: as above with daughter  Disposition: Home_x__/SNF___/Other________/Unknown at this time________

## 2023-07-03 NOTE — CONSULT NOTE ADULT - ATTENDING COMMENTS
Pts daughter prefers to avoid endoscopy at this time. If pt has evidence of overt GI bleeding with drop in Hb and pts family want to readdress endoscopy please notify GI.

## 2023-07-04 DIAGNOSIS — R10.2 PELVIC AND PERINEAL PAIN: ICD-10-CM

## 2023-07-04 LAB
ANION GAP SERPL CALC-SCNC: 13 MMOL/L — SIGNIFICANT CHANGE UP (ref 7–14)
ANION GAP SERPL CALC-SCNC: 15 MMOL/L — HIGH (ref 7–14)
BUN SERPL-MCNC: 28 MG/DL — HIGH (ref 10–20)
BUN SERPL-MCNC: 33 MG/DL — HIGH (ref 10–20)
CALCIUM SERPL-MCNC: 8.7 MG/DL — SIGNIFICANT CHANGE UP (ref 8.4–10.5)
CALCIUM SERPL-MCNC: 8.9 MG/DL — SIGNIFICANT CHANGE UP (ref 8.4–10.4)
CHLORIDE SERPL-SCNC: 105 MMOL/L — SIGNIFICANT CHANGE UP (ref 98–110)
CHLORIDE SERPL-SCNC: 107 MMOL/L — SIGNIFICANT CHANGE UP (ref 98–110)
CO2 SERPL-SCNC: 21 MMOL/L — SIGNIFICANT CHANGE UP (ref 17–32)
CO2 SERPL-SCNC: 22 MMOL/L — SIGNIFICANT CHANGE UP (ref 17–32)
CREAT SERPL-MCNC: 0.8 MG/DL — SIGNIFICANT CHANGE UP (ref 0.7–1.5)
CREAT SERPL-MCNC: 0.9 MG/DL — SIGNIFICANT CHANGE UP (ref 0.7–1.5)
EGFR: 60 ML/MIN/1.73M2 — SIGNIFICANT CHANGE UP
EGFR: 69 ML/MIN/1.73M2 — SIGNIFICANT CHANGE UP
GLUCOSE SERPL-MCNC: 178 MG/DL — HIGH (ref 70–99)
GLUCOSE SERPL-MCNC: 95 MG/DL — SIGNIFICANT CHANGE UP (ref 70–99)
HCT VFR BLD CALC: 27 % — LOW (ref 37–47)
HGB BLD-MCNC: 8.9 G/DL — LOW (ref 12–16)
MAGNESIUM SERPL-MCNC: 2.1 MG/DL — SIGNIFICANT CHANGE UP (ref 1.8–2.4)
MCHC RBC-ENTMCNC: 31.3 PG — HIGH (ref 27–31)
MCHC RBC-ENTMCNC: 33 G/DL — SIGNIFICANT CHANGE UP (ref 32–37)
MCV RBC AUTO: 95.1 FL — SIGNIFICANT CHANGE UP (ref 81–99)
NRBC # BLD: 0 /100 WBCS — SIGNIFICANT CHANGE UP (ref 0–0)
PHOSPHATE SERPL-MCNC: 3.7 MG/DL — SIGNIFICANT CHANGE UP (ref 2.1–4.9)
PLATELET # BLD AUTO: 152 K/UL — SIGNIFICANT CHANGE UP (ref 130–400)
PMV BLD: 10.5 FL — HIGH (ref 7.4–10.4)
POTASSIUM SERPL-MCNC: 3.1 MMOL/L — LOW (ref 3.5–5)
POTASSIUM SERPL-MCNC: 4.2 MMOL/L — SIGNIFICANT CHANGE UP (ref 3.5–5)
POTASSIUM SERPL-SCNC: 3.1 MMOL/L — LOW (ref 3.5–5)
POTASSIUM SERPL-SCNC: 4.2 MMOL/L — SIGNIFICANT CHANGE UP (ref 3.5–5)
RBC # BLD: 2.84 M/UL — LOW (ref 4.2–5.4)
RBC # FLD: 15.2 % — HIGH (ref 11.5–14.5)
SODIUM SERPL-SCNC: 139 MMOL/L — SIGNIFICANT CHANGE UP (ref 135–146)
SODIUM SERPL-SCNC: 144 MMOL/L — SIGNIFICANT CHANGE UP (ref 135–146)
WBC # BLD: 8.72 K/UL — SIGNIFICANT CHANGE UP (ref 4.8–10.8)
WBC # FLD AUTO: 8.72 K/UL — SIGNIFICANT CHANGE UP (ref 4.8–10.8)

## 2023-07-04 PROCEDURE — 99232 SBSQ HOSP IP/OBS MODERATE 35: CPT

## 2023-07-04 PROCEDURE — 99223 1ST HOSP IP/OBS HIGH 75: CPT

## 2023-07-04 RX ORDER — METOPROLOL TARTRATE 50 MG
25 TABLET ORAL DAILY
Refills: 0 | Status: DISCONTINUED | OUTPATIENT
Start: 2023-07-04 | End: 2023-07-12

## 2023-07-04 RX ORDER — POTASSIUM CHLORIDE 20 MEQ
40 PACKET (EA) ORAL ONCE
Refills: 0 | Status: COMPLETED | OUTPATIENT
Start: 2023-07-04 | End: 2023-07-04

## 2023-07-04 RX ORDER — ACETAMINOPHEN 500 MG
1000 TABLET ORAL EVERY 8 HOURS
Refills: 0 | Status: DISCONTINUED | OUTPATIENT
Start: 2023-07-04 | End: 2023-07-12

## 2023-07-04 RX ORDER — TRAMADOL HYDROCHLORIDE 50 MG/1
50 TABLET ORAL EVERY 6 HOURS
Refills: 0 | Status: DISCONTINUED | OUTPATIENT
Start: 2023-07-04 | End: 2023-07-09

## 2023-07-04 RX ADMIN — DORZOLAMIDE HYDROCHLORIDE 1 DROP(S): 20 SOLUTION/ DROPS OPHTHALMIC at 17:44

## 2023-07-04 RX ADMIN — PANTOPRAZOLE SODIUM 40 MILLIGRAM(S): 20 TABLET, DELAYED RELEASE ORAL at 06:06

## 2023-07-04 RX ADMIN — Medication 5 MILLIGRAM(S): at 12:09

## 2023-07-04 RX ADMIN — Medication 1 DROP(S): at 17:43

## 2023-07-04 RX ADMIN — Medication 40 MILLIEQUIVALENT(S): at 12:09

## 2023-07-04 RX ADMIN — Medication 25 MILLIGRAM(S): at 12:04

## 2023-07-04 RX ADMIN — LATANOPROST 1 DROP(S): 0.05 SOLUTION/ DROPS OPHTHALMIC; TOPICAL at 23:02

## 2023-07-04 RX ADMIN — PANTOPRAZOLE SODIUM 40 MILLIGRAM(S): 20 TABLET, DELAYED RELEASE ORAL at 17:44

## 2023-07-04 RX ADMIN — SENNA PLUS 2 TABLET(S): 8.6 TABLET ORAL at 23:02

## 2023-07-04 RX ADMIN — Medication 1000 MILLIGRAM(S): at 23:02

## 2023-07-04 RX ADMIN — Medication 5 MILLIGRAM(S): at 06:09

## 2023-07-04 RX ADMIN — Medication 1 DROP(S): at 06:02

## 2023-07-04 RX ADMIN — LIDOCAINE 1 PATCH: 4 CREAM TOPICAL at 19:00

## 2023-07-04 RX ADMIN — DORZOLAMIDE HYDROCHLORIDE 1 DROP(S): 20 SOLUTION/ DROPS OPHTHALMIC at 06:01

## 2023-07-04 RX ADMIN — LIDOCAINE 1 PATCH: 4 CREAM TOPICAL at 05:27

## 2023-07-04 NOTE — PROGRESS NOTE ADULT - ASSESSMENT
89 yo F PMH of HTN, HLD, CAD, and advanced dementia presented for evaluation of in the setting of NSAID and steroid use.      UGIB presenting with melena and anemia  Likely due to possible PUD   - Labs show a leukocytosis of 14k, Hb of 9.4 from a baseline of 12g/dl, lactate 2.9, BUN/Cr 56/0.7   - pt has been taking meloxicam since her pelvic fracture about 2 weeks in early 6/2023  - has been on chronic prednisone 5mg for 3 years  - does not take a ppi at home and has been having epigastric pain the past 2 weeks  - denies use of pepto bismol and iron tablets  - no previous cscope or EGD; no FH of CRC  - CTAP: no active bleeding noted  - 2 large bore IVs  - PPI 40mg bid switched to PPI drip, changed back to BID dose  - resumed metoprolol but started with 5mg metoprolol IVP as pt refusing po  - received IVF @ 150cc/hr but held for now  - last LA from 7/1 was normal aat 1.8  - repeat lactate q8hrs until normalized  - hgb stable, diet advanced  - GI recs appreciated  - daughter is open to egd/colonoscopy if necesarry but since hgb now stable wants to hold off  - transfuse if Hb <8 due to CAD  - hold aspirin  - hold NSAIDs  - SCDs  - bleeding and fall precautions  - received 1 unit prbc on 6/30    Afib with rvr, new vs paroxysmal  - coreg started, tachycardia doesn't appear to be related to bleed as LA and hgb stable  - will not initiate anticoagulation after team had discussion of risks and benefits with daughter who understood the risks of not initiating anticoagulation  - CHADSVASC 4 indicating a 4.8% risk of stroke vs HAS BLED of 3 points indicating a 5.8% risk of bleeding   - pt finally accepting PO, IV metoprolol stopped, PO succinate bid started    Decreased POintake  - likely due to delirium in setting of advanced dementia  - improved today  - daughter doesn't think shed want NGT or PEG    Chronic prednisone use for sob?  - pt has been on prednisone for 3 years  - was on 10mg until she was tapered to 5mg recently  - follows op with Dr. Sousa  - hasn't received prednisone in several days due to refusing PO  - does not exhibit adrenal insufficency  - received 5 mg today    AAA  - pt noted to have a 3.9 AAA that has increased by 0.4 since 1 year ago  - given its size and rate of growth, will need op surveillance with an US  - avoid HTN    Chronic L2 compression fracture  - abdominal binder  - TLSO brace on discharge    AD  HTN  HLD  - unclear history of whether it was ischemic cardiomyopathy; no stents; no CHF  - on aspirin, statin and metoprolol at home  - hold oral meds for now    Pelvic fracture following fall  - treat pain with lidocaine patch and tramadol  - avoid NSAIDs  - pt follows with Dr. Coyne, family requesting eval as pt grimacing.     Glaucoma  - c/w timolol, dorzolamide, latanoprost    Advanced dementia  - not on any medications  - redirection  - daily orientation  - keep shades to window open  - haldol for agitation  - once pt is able to take po, can do seroquel or trazodone    #proph  - vte: SCD  - gi: miralax and ppi  - fall and bleeding precautions  - CODE status: DNR/DNI as determined by her daughter who is her hcp as pt's advanced dementia precludes her from making an informed decision on her own    #Progress Note Handoff:  Pending (specify):  rate control. Pt now taking PO, hopeful rate control with metoprolol succinate with dc in 24-48 hours  Family discussion: as above with daughter  Disposition: Home_x__/SNF___/Other________/Unknown at this time________

## 2023-07-04 NOTE — CONSULT NOTE ADULT - SUBJECTIVE AND OBJECTIVE BOX
NEUROSURGERY PAIN MANAGEMENT CONSULT NOTE    Chief Complaint:    HPI:  89yo F with a PMH of HTN, HLD, CAD, Dementia(A&O1 at baseline) present for weakness and abdominal pain that started this morning. History per the ED note as patient and unable to reach daughter (Called twice with no answer). Pt noted to have dark black stools for the past few days. Pt on AC per ED. No fevers, chill, headache, recent illness/travel, cough, chest pain, or SOB.    In the ED,   Vitals: /60, HR 96, RR 16, T 98.1, satting 98 percent on RA   Labs: WBC 14.10. Hgb 9.4 (11.9 on 6/21), , Na 141, K 4.7, BUN 56, Cr .7, lactate 2.9, UA negative   Imaging: CTAP pending     in the ED, given fluids and PPI   Admitted to medicine for GI bleed  (30 Jun 2023 03:41)      PAST MEDICAL & SURGICAL HISTORY:  CAD (coronary artery disease)      HTN (hypertension)          FAMILY HISTORY:      SOCIAL HISTORY:  [ ] Denies Smoking, Alcohol, or Drug Use    HOME MEDICATIONS:   Please refer to initial HNP    PAIN HOME MEDICATIONS:    Allergies    No Known Allergies    Intolerances        PAIN MEDICATIONS:  acetaminophen     Tablet .. 650 milliGRAM(s) Oral every 6 hours PRN  morphine  - Injectable 1 milliGRAM(s) IV Push every 6 hours PRN    Heme:    Antibiotics:    Cardiovascular:  metoprolol succinate ER 25 milliGRAM(s) Oral daily    GI:  pantoprazole  Injectable 40 milliGRAM(s) IV Push two times a day  polyethylene glycol 3350 17 Gram(s) Oral daily  senna 2 Tablet(s) Oral at bedtime    Endocrine:  predniSONE   Tablet 5 milliGRAM(s) Oral daily    All Other Medications:  dorzolamide 2% Ophthalmic Solution 1 Drop(s) Both EYES <User Schedule>  latanoprost 0.005% Ophthalmic Solution 1 Drop(s) Both EYES at bedtime  lidocaine   4% Patch 1 Patch Transdermal every 24 hours  timolol 0.5% Solution 1 Drop(s) Both EYES every 12 hours      Vital Signs Last 24 Hrs  T(C): 36.4 (04 Jul 2023 05:01), Max: 36.5 (03 Jul 2023 20:15)  T(F): 97.6 (04 Jul 2023 05:01), Max: 97.7 (03 Jul 2023 20:15)  HR: 82 (04 Jul 2023 10:20) (77 - 111)  BP: 109/68 (04 Jul 2023 10:20) (94/64 - 154/80)  BP(mean): 77 (03 Jul 2023 23:48) (77 - 77)  RR: 16 (04 Jul 2023 10:20) (16 - 18)  SpO2: 98% (04 Jul 2023 05:01) (98% - 98%)    Parameters below as of 04 Jul 2023 05:01  Patient On (Oxygen Delivery Method): room air        LABS:                        8.9    8.72  )-----------( 152      ( 04 Jul 2023 05:55 )             27.0     07-04    144  |  107  |  28<H>  ----------------------------<  95  3.1<L>   |  22  |  0.8    Ca    8.7      04 Jul 2023 05:55  Phos  3.7     07-04  Mg     2.1     07-04    TPro  5.3<L>  /  Alb  3.4<L>  /  TBili  1.3<H>  /  DBili  x   /  AST  28  /  ALT  17  /  AlkPhos  57  07-03      Urinalysis Basic - ( 04 Jul 2023 05:55 )    Color: x / Appearance: x / SG: x / pH: x  Gluc: 95 mg/dL / Ketone: x  / Bili: x / Urobili: x   Blood: x / Protein: x / Nitrite: x   Leuk Esterase: x / RBC: x / WBC x   Sq Epi: x / Non Sq Epi: x / Bacteria: x        RADIOLOGY: CT A&P  IMPRESSION:    1. Since June 14, 2022, no definite evidence of acute/inflammatory   process within the abdomen or pelvis.    2. 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    3. New mild age-indeterminate compression deformity of the L2 vertebral   body; correlation with point tenderness is suggested.        REVIEW OF SYSTEMS:  CONSTITUTIONAL: No fever, chills. Well appearing  EYES: No eye pain, visual disturbances  NECK: No pain or stiffness  RESPIRATORY: No cough, wheezing; No shortness of breath  CARDIOVASCULAR: No chest pain, palpitations.   GASTROINTESTINAL: Pt reports + bowel movements. No abdominal or epigastric pain. No nausea, vomiting.   GENITOURINARY: No dysuria, frequency, or incontinence  NEUROLOGICAL: No loss of strength, numbness, or tremors. No dizzinesss or lightheadedness with pain medications.   MUSCULOSKELETAL: No joint pain or swelling; + muscle pain      PHYSICAL EXAM  GENERAL: Laying in bed, NAD  Cranial nerves grossly intact  Motor exam: 5/5 b/l UE. 5/5 b/l LE  Sensation intact to LT in UE/LE in 3 dermatomes  CHEST/LUNG: Clear to auscultation bilaterally; No rales, rhonchi, wheezing, or rubs  ABDOMEN: Soft, Nontender, Nondistended; Bowel sounds present  EXTREMITIES:  2+ Peripheral Pulses, No clubbing, cyanosis, or edema  SKIN: No rashes or lesions      ASSESSMENT:   HPI:  89yo F with a PMH of HTN, HLD, CAD, Dementia(A&O1 at baseline) present for weakness and abdominal pain that started this morning. History per the ED note as patient and unable to reach daughter (Called twice with no answer). Pt noted to have dark black stools for the past few days. Pt on AC per ED. No fevers, chill, headache, recent illness/travel, cough, chest pain, or SOB.    In the ED,   Vitals: /60, HR 96, RR 16, T 98.1, satting 98 percent on RA   Labs: WBC 14.10. Hgb 9.4 (11.9 on 6/21), , Na 141, K 4.7, BUN 56, Cr .7, lactate 2.9, UA negative   Imaging: CTAP pending     in the ED, given fluids and PPI   Admitted to medicine for GI bleed  (30 Jun 2023 03:41)      PLAN:       Bowel regimen: miralax / colace  Nausea ppx: zofran standing  Functional goals: oob-chair, ambulate PRN as per primary team  Physical therapy      PAIN MANAGEMENT CONSULT NOTE    Chief Complaint:    HPI:  89yo F with a PMH of HTN, HLD, CAD, Dementia(A&O1 at baseline) present for weakness and abdominal pain that started this morning. History per the ED note as patient and unable to reach daughter (Called twice with no answer). Pt noted to have dark black stools for the past few days. Pt on AC per ED.     In the ED,   Vitals: /60, HR 96, RR 16, T 98.1, satting 98 percent on RA   Labs: WBC 14.10. Hgb 9.4 (11.9 on 6/21), , Na 141, K 4.7, BUN 56, Cr .7, lactate 2.9, UA negative   Imaging: CTAP pending     in the ED, given fluids and PPI   Admitted to medicine for GI bleed  (30 Jun 2023 03:41)      Not seen today as she was not in the room when I was in the hospital.       PAST MEDICAL & SURGICAL HISTORY:  CAD (coronary artery disease)      HTN (hypertension)          FAMILY HISTORY:      SOCIAL HISTORY:  [ ] Denies Smoking, Alcohol, or Drug Use    HOME MEDICATIONS:   Please refer to initial HNP    PAIN HOME MEDICATIONS:    Allergies    No Known Allergies    Intolerances        PAIN MEDICATIONS:  acetaminophen     Tablet .. 650 milliGRAM(s) Oral every 6 hours PRN  morphine  - Injectable 1 milliGRAM(s) IV Push every 6 hours PRN    Heme:    Antibiotics:    Cardiovascular:  metoprolol succinate ER 25 milliGRAM(s) Oral daily    GI:  pantoprazole  Injectable 40 milliGRAM(s) IV Push two times a day  polyethylene glycol 3350 17 Gram(s) Oral daily  senna 2 Tablet(s) Oral at bedtime    Endocrine:  predniSONE   Tablet 5 milliGRAM(s) Oral daily    All Other Medications:  dorzolamide 2% Ophthalmic Solution 1 Drop(s) Both EYES <User Schedule>  latanoprost 0.005% Ophthalmic Solution 1 Drop(s) Both EYES at bedtime  lidocaine   4% Patch 1 Patch Transdermal every 24 hours  timolol 0.5% Solution 1 Drop(s) Both EYES every 12 hours      Vital Signs Last 24 Hrs  T(C): 36.4 (04 Jul 2023 05:01), Max: 36.5 (03 Jul 2023 20:15)  T(F): 97.6 (04 Jul 2023 05:01), Max: 97.7 (03 Jul 2023 20:15)  HR: 82 (04 Jul 2023 10:20) (77 - 111)  BP: 109/68 (04 Jul 2023 10:20) (94/64 - 154/80)  BP(mean): 77 (03 Jul 2023 23:48) (77 - 77)  RR: 16 (04 Jul 2023 10:20) (16 - 18)  SpO2: 98% (04 Jul 2023 05:01) (98% - 98%)    Parameters below as of 04 Jul 2023 05:01  Patient On (Oxygen Delivery Method): room air        LABS:                        8.9    8.72  )-----------( 152      ( 04 Jul 2023 05:55 )             27.0     07-04    144  |  107  |  28<H>  ----------------------------<  95  3.1<L>   |  22  |  0.8    Ca    8.7      04 Jul 2023 05:55  Phos  3.7     07-04  Mg     2.1     07-04    TPro  5.3<L>  /  Alb  3.4<L>  /  TBili  1.3<H>  /  DBili  x   /  AST  28  /  ALT  17  /  AlkPhos  57  07-03      Urinalysis Basic - ( 04 Jul 2023 05:55 )    Color: x / Appearance: x / SG: x / pH: x  Gluc: 95 mg/dL / Ketone: x  / Bili: x / Urobili: x   Blood: x / Protein: x / Nitrite: x   Leuk Esterase: x / RBC: x / WBC x   Sq Epi: x / Non Sq Epi: x / Bacteria: x        RADIOLOGY: CT A&P  IMPRESSION:    1. Since June 14, 2022, no definite evidence of acute/inflammatory   process within the abdomen or pelvis.    2. 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    3. New mild age-indeterminate compression deformity of the L2 vertebral   body; correlation with point tenderness is suggested.        Physical exam:  The patient was not in the room when I was in the hospital. I will have to return for an examination to take place.      ASSESSMENT:   HPI:  89yo F with a PMH of HTN, HLD, CAD, Dementia(A&O1 at baseline) present for weakness and abdominal pain that started this morning. History per the ED note as patient and unable to reach daughter (Called twice with no answer). Pt noted to have dark black stools for the past few days. Pt on AC per ED. No fevers, chill, headache, recent illness/travel, cough, chest pain, or SOB.    In the ED,   Vitals: /60, HR 96, RR 16, T 98.1, satting 98 percent on RA   Labs: WBC 14.10. Hgb 9.4 (11.9 on 6/21), , Na 141, K 4.7, BUN 56, Cr .7, lactate 2.9, UA negative   Imaging: CTAP pending     in the ED, given fluids and PPI   Admitted to medicine for GI bleed  (30 Jun 2023 03:41)            Recommendations  Tylenol 1000mg q8h standing  tramadol 50mg q6h PRN  lidocaine patch 4 or 5% q12h PRN  PT  Bowel regimen: miralax / colace  Nausea ppx: zofran standing  Functional goals: oob-chair, ambulate PRN as per primary team  Physical therapy

## 2023-07-04 NOTE — PROGRESS NOTE ADULT - SUBJECTIVE AND OBJECTIVE BOX
CHIEF COMPLAINT:    Patient is a 89y old  Female who presents with a chief complaint of Melena     (02 Jul 2023 15:27)      INTERVAL HPI/OVERNIGHT EVENTS:    Patient seen and examined at bedside. No acute overnight events occurred.    ROS: Unable to obtain.     Medications:  Standing  acetaminophen     Tablet .. 1000 milliGRAM(s) Oral every 8 hours  dorzolamide 2% Ophthalmic Solution 1 Drop(s) Both EYES <User Schedule>  latanoprost 0.005% Ophthalmic Solution 1 Drop(s) Both EYES at bedtime  lidocaine   4% Patch 1 Patch Transdermal every 24 hours  metoprolol succinate ER 25 milliGRAM(s) Oral daily  pantoprazole  Injectable 40 milliGRAM(s) IV Push two times a day  polyethylene glycol 3350 17 Gram(s) Oral daily  predniSONE   Tablet 5 milliGRAM(s) Oral daily  senna 2 Tablet(s) Oral at bedtime  timolol 0.5% Solution 1 Drop(s) Both EYES every 12 hours    PRN Meds  morphine  - Injectable 1 milliGRAM(s) IV Push every 6 hours PRN  traMADol 50 milliGRAM(s) Oral every 6 hours PRN        Vital Signs:    T(F): 97.6 (07-04-23 @ 05:01), Max: 97.7 (07-03-23 @ 20:15)  HR: 91 (07-04-23 @ 14:39) (82 - 111)  BP: 175/86 (07-04-23 @ 14:39) (94/64 - 175/86)  RR: 16 (07-04-23 @ 14:39) (16 - 18)  SpO2: 98% (07-04-23 @ 05:01) (98% - 98%)  I&O's Summary    03 Jul 2023 07:01  -  04 Jul 2023 07:00  --------------------------------------------------------  IN: 30 mL / OUT: 200 mL / NET: -170 mL    04 Jul 2023 07:01  -  04 Jul 2023 15:59  --------------------------------------------------------  IN: 0 mL / OUT: 301 mL / NET: -301 mL        PHYSICAL EXAM:  GENERAL:  NAD  SKIN: No rashes or lesions  HEENT: Atraumatic. Normocephalic. Anicteric  NECK:  No JVD.   PULMONARY: Clear to ausculation bilaterally. No wheezing. No rales  CVS: Normal S1, S2. Regular rate and rhythm. No murmurs.  ABDOMEN/GI: Soft, Nontender, Nondistended; Bowel sounds are present  EXTREMITIES:  No edema B/L LE.  NEUROLOGIC:  No motor deficit.  PSYCH: Alert & oriented x 3, normal affect      LABS:                        8.9    8.72  )-----------( 152      ( 04 Jul 2023 05:55 )             27.0     07-04    144  |  107  |  28<H>  ----------------------------<  95  3.1<L>   |  22  |  0.8    Ca    8.7      04 Jul 2023 05:55  Phos  3.7     07-04  Mg     2.1     07-04    TPro  5.3<L>  /  Alb  3.4<L>  /  TBili  1.3<H>  /  DBili  x   /  AST  28  /  ALT  17  /  AlkPhos  57  07-03              RADIOLOGY & ADDITIONAL TESTS:  Imaging or report Personally Reviewed:  [ ] YES  [ ] NO -->no new images    Telemetry reviewed independently - NSR, no acute events  EKG reviewed independently -->no new EKGs    Consultant(s) Notes Reviewed:  [ ] YES  [ ] NO  Care Discussed with Consultants/Other Providers [ ] YES  [ ] NO    Case discussed with resident  Care discussed with pt

## 2023-07-04 NOTE — CHART NOTE - NSCHARTNOTEFT_GEN_A_CORE
Spoke to daughter at bedside, daughter concerned about long term prednisone use  patient has been on prednisone 5mg daily for ~3 years as per daughter  patient was tapered from 10mg daily by her pulmonologist Dr. Boubacar Vazquez ([phone])  left pulmonologist a message to call back  will continue prednisone 5mg and taper to 2.5mg daily when appropriate to eventually taper off if prednisone maintenance is not warranted

## 2023-07-05 ENCOUNTER — TRANSCRIPTION ENCOUNTER (OUTPATIENT)
Age: 88
End: 2023-07-05

## 2023-07-05 LAB
ANION GAP SERPL CALC-SCNC: 12 MMOL/L — SIGNIFICANT CHANGE UP (ref 7–14)
BUN SERPL-MCNC: 29 MG/DL — HIGH (ref 10–20)
CALCIUM SERPL-MCNC: 9 MG/DL — SIGNIFICANT CHANGE UP (ref 8.4–10.5)
CHLORIDE SERPL-SCNC: 106 MMOL/L — SIGNIFICANT CHANGE UP (ref 98–110)
CO2 SERPL-SCNC: 24 MMOL/L — SIGNIFICANT CHANGE UP (ref 17–32)
CREAT SERPL-MCNC: 0.8 MG/DL — SIGNIFICANT CHANGE UP (ref 0.7–1.5)
EGFR: 69 ML/MIN/1.73M2 — SIGNIFICANT CHANGE UP
GLUCOSE SERPL-MCNC: 122 MG/DL — HIGH (ref 70–99)
HCT VFR BLD CALC: 27.7 % — LOW (ref 37–47)
HGB BLD-MCNC: 9.3 G/DL — LOW (ref 12–16)
MAGNESIUM SERPL-MCNC: 2 MG/DL — SIGNIFICANT CHANGE UP (ref 1.8–2.4)
MCHC RBC-ENTMCNC: 31.7 PG — HIGH (ref 27–31)
MCHC RBC-ENTMCNC: 33.6 G/DL — SIGNIFICANT CHANGE UP (ref 32–37)
MCV RBC AUTO: 94.5 FL — SIGNIFICANT CHANGE UP (ref 81–99)
NRBC # BLD: 0 /100 WBCS — SIGNIFICANT CHANGE UP (ref 0–0)
PHOSPHATE SERPL-MCNC: 2.6 MG/DL — SIGNIFICANT CHANGE UP (ref 2.1–4.9)
PLATELET # BLD AUTO: 173 K/UL — SIGNIFICANT CHANGE UP (ref 130–400)
PMV BLD: 10.4 FL — SIGNIFICANT CHANGE UP (ref 7.4–10.4)
POTASSIUM SERPL-MCNC: 3.7 MMOL/L — SIGNIFICANT CHANGE UP (ref 3.5–5)
POTASSIUM SERPL-SCNC: 3.7 MMOL/L — SIGNIFICANT CHANGE UP (ref 3.5–5)
RBC # BLD: 2.93 M/UL — LOW (ref 4.2–5.4)
RBC # FLD: 15.2 % — HIGH (ref 11.5–14.5)
SODIUM SERPL-SCNC: 142 MMOL/L — SIGNIFICANT CHANGE UP (ref 135–146)
WBC # BLD: 8.37 K/UL — SIGNIFICANT CHANGE UP (ref 4.8–10.8)
WBC # FLD AUTO: 8.37 K/UL — SIGNIFICANT CHANGE UP (ref 4.8–10.8)

## 2023-07-05 PROCEDURE — 99232 SBSQ HOSP IP/OBS MODERATE 35: CPT

## 2023-07-05 RX ADMIN — TRAMADOL HYDROCHLORIDE 50 MILLIGRAM(S): 50 TABLET ORAL at 12:07

## 2023-07-05 RX ADMIN — LIDOCAINE 1 PATCH: 4 CREAM TOPICAL at 19:08

## 2023-07-05 RX ADMIN — TRAMADOL HYDROCHLORIDE 50 MILLIGRAM(S): 50 TABLET ORAL at 11:34

## 2023-07-05 RX ADMIN — POLYETHYLENE GLYCOL 3350 17 GRAM(S): 17 POWDER, FOR SOLUTION ORAL at 11:35

## 2023-07-05 RX ADMIN — PANTOPRAZOLE SODIUM 40 MILLIGRAM(S): 20 TABLET, DELAYED RELEASE ORAL at 18:15

## 2023-07-05 RX ADMIN — DORZOLAMIDE HYDROCHLORIDE 1 DROP(S): 20 SOLUTION/ DROPS OPHTHALMIC at 06:52

## 2023-07-05 RX ADMIN — Medication 5 MILLIGRAM(S): at 07:00

## 2023-07-05 RX ADMIN — Medication 1000 MILLIGRAM(S): at 07:00

## 2023-07-05 RX ADMIN — Medication 25 MILLIGRAM(S): at 06:59

## 2023-07-05 RX ADMIN — Medication 1 DROP(S): at 06:53

## 2023-07-05 RX ADMIN — Medication 1 DROP(S): at 18:14

## 2023-07-05 RX ADMIN — LATANOPROST 1 DROP(S): 0.05 SOLUTION/ DROPS OPHTHALMIC; TOPICAL at 22:07

## 2023-07-05 RX ADMIN — DORZOLAMIDE HYDROCHLORIDE 1 DROP(S): 20 SOLUTION/ DROPS OPHTHALMIC at 18:14

## 2023-07-05 RX ADMIN — LIDOCAINE 1 PATCH: 4 CREAM TOPICAL at 08:30

## 2023-07-05 RX ADMIN — SENNA PLUS 2 TABLET(S): 8.6 TABLET ORAL at 22:07

## 2023-07-05 RX ADMIN — LIDOCAINE 1 PATCH: 4 CREAM TOPICAL at 07:00

## 2023-07-05 RX ADMIN — PANTOPRAZOLE SODIUM 40 MILLIGRAM(S): 20 TABLET, DELAYED RELEASE ORAL at 07:00

## 2023-07-05 NOTE — CHART NOTE - NSCHARTNOTEFT_GEN_A_CORE
Registered Dietitian Follow-Up  Patient Profile Reviewed                           Yes [x]   No []  Nutrition History Previously Obtained        Yes [x]  No []      Pertinent Medical Interventions:  89 yo F PMH of HTN, HLD, CAD, and advanced dementia presented for evaluation of in the setting of NSAID and steroid use.  pending discharge to home     Nutrition Interval History:   per GOC/ daughter currently not wanting NGT or PEG tube   today patient PO intake improved, was poor patient had been having not much of meal trays   no SLP evaluation was completed this admission, last year was recommend for soft and bite size thin liquids   Nutrient Intake: Patient meeting <50% of estimated energy needs in-house overall though improving as of today    Diet order:   Diet, Soft and Bite Sized:   Fiber/Residue Restricted  Supplement Feeding Modality:  Oral  Ensure Plus High Protein Cans or Servings Per Day:  2       Frequency:  Daily (23 @ 15:46) [Active]    Anthropometrics:  Height (cm): 157.5 (23 @ 18:08)  Weight (kg): 64.9 (23 @ 18:08)  BMI (kg/m2): 26.2 (23 @ 18:08)  IBW: 50kg  current admission weights (kg):   Daily Weight in k.4 ()    MEDICATIONS  (STANDING):  acetaminophen     Tablet .. 1000 milliGRAM(s) Oral every 8 hours  dorzolamide 2% Ophthalmic Solution 1 Drop(s) Both EYES <User Schedule>  latanoprost 0.005% Ophthalmic Solution 1 Drop(s) Both EYES at bedtime  lidocaine   4% Patch 1 Patch Transdermal every 24 hours  metoprolol succinate ER 25 milliGRAM(s) Oral daily  pantoprazole  Injectable 40 milliGRAM(s) IV Push two times a day  polyethylene glycol 3350 17 Gram(s) Oral daily  predniSONE   Tablet 4 milliGRAM(s) Oral daily  senna 2 Tablet(s) Oral at bedtime  timolol 0.5% Solution 1 Drop(s) Both EYES every 12 hours    MEDICATIONS  (PRN):  morphine  - Injectable 1 milliGRAM(s) IV Push every 6 hours PRN Severe Pain (7 - 10)  traMADol 50 milliGRAM(s) Oral every 6 hours PRN Moderate Pain (4 - 6)    Pertinent Labs:  @ 07:09: Na 142, BUN 29<H>, Cr 0.8, <H>, K+ 3.7, Phos 2.6, Mg 2.0, Alk Phos --, ALT/SGPT --, AST/SGOT --, HbA1c --   @ 17:58: Na 139, BUN 33<H>, Cr 0.9, <H>, K+ 4.2, Phos --, Mg --, Alk Phos --, ALT/SGPT --, AST/SGOT --, HbA1c --    Physical Findings:  - Cognition: confused  - GI function: last bowel movement 7/4 x1  - Tubes:  none  - Oral/Mouth cavity:  soft and bite size   - Skin:  no pressure injuries indicated in flow sheets   - Edema: no edema noted in flow sheets      Nutrition Requirements:   Weight Used: 64.9kg with consideration for age, wt, BMI, acute malnutrition   ENERGY: 4792-7760 kcal/day (MSJ x 1.2-1.3)  PROTEIN: 78-84g/day (1.2-1.3g/kg)  FLUIDS: 1mL/kcal    [x] Previous Nutrition Diagnosis: malnutrition             [x] Ongoing          [] Resolved  Goal/Expected Outcome: meet >/=75% estimated energy needs x3-5days  Nutrition Intervention: Meals and Snacks, Medical Food Supplement, Vitamin Supplement, Nutrition Related Medication, Coordination of Care  Indicator/Monitoring:  Monitor diet order, energy intake, food and nutrient intake, body composition, weight    Recommendations:  - currently on soft and bite size. would suggest to consult SLP services to determine safest/least restrictive diet  - can remove low fiber modifier  - continue Ensure HIGH PROTEIN 2x/day (350kcal, 20 g pro/serving)  between meals  - 1:1 feeding assistance/ motivation  - maintain aspiration precautions   - bowel regimen to promote regular bowel movements  - recommend MV     Patient at HIGH nutrition risk   will follow up within 3-5days  Sukhi Serrano, RD  [phone] or via TEAMS

## 2023-07-05 NOTE — PHYSICAL THERAPY INITIAL EVALUATION ADULT - PERTINENT HX OF CURRENT PROBLEM, REHAB EVAL
pt adm for UGIB, melena, and anemia, Afib with RVR, pt with h/o dementia, daughter stated pt with R pelvic fx 1 year ago

## 2023-07-05 NOTE — PHYSICAL THERAPY INITIAL EVALUATION ADULT - GENERAL OBSERVATIONS, REHAB EVAL
10:13-10:55 42 min  pt rec sitting OOB in chair in NAD, daughter at the b/s and requested to translate for pt, pt is Polish speaking, pt agreeable to PT

## 2023-07-05 NOTE — PHYSICAL THERAPY INITIAL EVALUATION ADULT - ADDITIONAL COMMENTS
pt lives with her daughter in a private house, 1 SUSI, 12 steps to main level, then 12 steps to bedroom and shower, pt amb with RW and required assist with ADLs PTA, assist with stair climbing, daughter stated she may be able to have pt sleep on main  level, but there is no shower there

## 2023-07-05 NOTE — PROGRESS NOTE ADULT - ASSESSMENT
89 yo F PMH of HTN, HLD, CAD, and advanced dementia presented for evaluation of in the setting of NSAID and steroid use.      UGIB presenting with melena and anemia, improved   Likely due to possible PUD   - Labs show a leukocytosis of 14k, Hb of 9.4 from a baseline of 12g/dl, lactate 2.9, BUN/Cr 56/0.7 (also on steroids)  - pt has been taking meloxicam since her pelvic fracture about 2 weeks in early 6/2023  - has been on chronic prednisone 5mg for 3 years  - does not take a ppi at home and has been having epigastric pain the past 2 weeks  - denies use of pepto bismol and iron tablets  - no previous cscope or EGD; no FH of CRC  - CTAP: no active bleeding noted  - 2 large bore IVs  - PPI 40mg bid switched to PPI drip, changed back to BID dose  - resumed metoprolol but started with 5mg metoprolol IVP as pt refusing po, now on oral only   - received IVF @ 150cc/hr but held for now  - last LA from 7/1 was normal aat 1.8  - repeat lactate 1.8  - hgb stable, diet advanced  - GI recs appreciated  - daughter is open to egd/colonoscopy if necesarry but since hgb now stable wants to hold off  - transfuse if Hb <8 due to CAD  - hold aspirin  - hold NSAIDs  - SCDs  - bleeding and fall precautions  - received 1 unit prbc on 6/30    Afib with rvr, new vs paroxysmal  - metoprolol started, tachycardia doesn't appear to be related to bleed as LA and hgb stable  - will not initiate anticoagulation after team had discussion of risks and benefits with daughter who understood the risks of not initiating anticoagulation  - CHADSVASC 4 indicating a 4.8% risk of stroke vs HAS BLED of 3 points indicating a 5.8% risk of bleeding   - pt finally accepting PO, IV metoprolol stopped, PO succinate started    Decreased POintake  - likely due to delirium in setting of advanced dementia  - improved today  - daughter doesn't think shed want NGT or PEG    Chronic prednisone use for sob?  - pt has been on prednisone for 3 years  - was on 10mg until she was tapered to 5mg recently  - follows op with Dr. Sousa  - hasn't received prednisone in several days due to refusing PO  - does not exhibit adrenal insufficency  - will start taper, d/w patient puylmonologist . will do slow taper     AAA  - pt noted to have a 3.9 AAA that has increased by 0.4 since 1 year ago  - given its size and rate of growth, will need op surveillance with an US  - avoid HTN    Chronic L2 compression fracture  - abdominal binder  - TLSO brace on discharge    AD  HTN  HLD  - unclear history of whether it was ischemic cardiomyopathy; no stents; no CHF  - on aspirin, statin and metoprolol at home  - asa on hold due to GI bleed     Pelvic fracture following fall  - treat pain with lidocaine patch and tramadol  - avoid NSAIDs  - pt follows with Dr. Coyne, family requesting eval as pt grimacing.     Glaucoma  - c/w timolol, dorzolamide, latanoprost    Advanced dementia  - not on any medications  - redirection  - daily orientation  - keep shades to window open  - haldol for agitation  - once pt is able to take po, can do trazodone    #proph  - vte: SCD  - gi: miralax and ppi  - fall and bleeding precautions  - CODE status: DNR/DNI as determined by her daughter who is her hcp as pt's advanced dementia precludes her from making an informed decision on her own    #Progress Note Handoff:  Pending (specify):  rate control. Pt now taking PO, hopeful rate control with metoprolol succinate with dc in 24-48 hours. still has sitter. daughter wants to take patient home although PT recommending facility.   Family discussion: as above with daughter  Disposition: Home_x__/SNF___/Other________/Unknown at this time________    follow up: sitter at bedside. pending clinical improvement due to agitation.    89 yo F PMH of HTN, HLD, CAD, and advanced dementia presented for evaluation of in the setting of NSAID and steroid use.      UGIB presenting with melena and anemia, improved   Likely due to possible PUD   - Labs show a leukocytosis of 14k, Hb of 9.4 from a baseline of 12g/dl, lactate 2.9, BUN/Cr 56/0.7 (also on steroids)  - pt has been taking meloxicam since her pelvic fracture about 2 weeks in early 6/2023  - has been on chronic prednisone 5mg for 3 years  - does not take a ppi at home and has been having epigastric pain the past 2 weeks  - denies use of pepto bismol and iron tablets  - no previous cscope or EGD; no FH of CRC  - CTAP: no active bleeding noted  - 2 large bore IVs  - PPI 40mg bid switched to PPI drip, changed back to BID dose  - resumed metoprolol but started with 5mg metoprolol IVP as pt refusing po, now on oral only   - received IVF @ 150cc/hr but held for now  - last LA from 7/1 was normal aat 1.8  - repeat lactate 1.8  - hgb stable, diet advanced  - GI recs appreciated  - daughter is open to egd/colonoscopy if necesarry but since hgb now stable wants to hold off  - transfuse if Hb <8 due to CAD  - hold aspirin  - hold NSAIDs  - SCDs  - bleeding and fall precautions  - received 1 unit prbc on 6/30    Afib with rvr, new vs paroxysmal  - metoprolol started, tachycardia doesn't appear to be related to bleed as LA and hgb stable  - will not initiate anticoagulation after team had discussion of risks and benefits with daughter who understood the risks of not initiating anticoagulation  - CHADSVASC 4 indicating a 4.8% risk of stroke vs HAS BLED of 3 points indicating a 5.8% risk of bleeding   - pt finally accepting PO, IV metoprolol stopped, PO succinate started    Decreased POintake  - likely due to delirium in setting of advanced dementia  - improved today  - daughter doesn't think shed want NGT or PEG    Chronic prednisone use for sob?  - pt has been on prednisone for 3 years  - was on 10mg until she was tapered to 5mg recently  - follows op with Dr. Sousa  - hasn't received prednisone in several days due to refusing PO  - does not exhibit adrenal insufficency  - will start taper, d/w patient puylmonologist . will do slow taper     AAA  - pt noted to have a 3.9 AAA that has increased by 0.4 since 1 year ago  - given its size and rate of growth, will need op surveillance with an US  - avoid HTN    Chronic L2 compression fracture  - abdominal binder  - TLSO brace on discharge    AD  HTN  HLD  - unclear history of whether it was ischemic cardiomyopathy; no stents; no CHF  - on aspirin, statin and metoprolol at home  - asa on hold due to GI bleed     Pelvic fracture following fall  - treat pain with lidocaine patch and tramadol  - avoid NSAIDs  - pt follows with Dr. Coyne, family requesting eval as pt grimacing.     Glaucoma  - c/w timolol, dorzolamide, latanoprost    Advanced dementia  - not on any medications  - redirection  - daily orientation  - keep shades to window open  - haldol for agitation  - once pt is able to take po, can do trazodone    #proph  - vte: SCD  - gi: miralax and ppi  - fall and bleeding precautions  - CODE status: DNR/DNI as determined by her daughter who is her hcp as pt's advanced dementia precludes her from making an informed decision on her own    #Progress Note Handoff:  Pending (specify):  rate control. Pt now taking PO, hopeful rate control with metoprolol succinate with dc in 24-48 hours. still has sitter. daughter wants to take patient home although PT recommending facility.   Family discussion: as above with daughter  Disposition: Home_x__/SNF___/Other________/Unknown at this time________    follow up: sitter at bedside. pending clinical improvement due to agitation. monitor hgb. if persistent drop recall GI.    89 yo F PMH of HTN, HLD, CAD, and advanced dementia presented for evaluation of in the setting of NSAID and steroid use.      UGIB presenting with melena and anemia, improved   Likely due to possible PUD   - Labs show a leukocytosis of 14k, Hb of 9.4 from a baseline of 12g/dl, lactate 2.9, BUN/Cr 56/0.7 (also on steroids)  - pt has been taking meloxicam since her pelvic fracture about 2 weeks in early 6/2023  - has been on chronic prednisone 5mg for 3 years  - does not take a ppi at home and has been having epigastric pain the past 2 weeks  - denies use of pepto bismol and iron tablets  - no previous cscope or EGD; no FH of CRC  - CTAP: no active bleeding noted  - 2 large bore IVs  - PPI 40mg bid switched to PPI drip, changed back to BID dose  - resumed metoprolol but started with 5mg metoprolol IVP as pt refusing po, now on oral only   - received IVF @ 150cc/hr but held for now  - last LA from 7/1 was normal aat 1.8  - repeat lactate 1.8  - hgb stable, diet advanced  - GI recs appreciated  - daughter is open to egd/colonoscopy if necesarry but since hgb now stable wants to hold off  - transfuse if Hb <8 due to CAD  - hold aspirin  - hold NSAIDs  - SCDs  - bleeding and fall precautions  - received 1 unit prbc on 6/30    Afib with rvr, new vs paroxysmal  - metoprolol started, tachycardia doesn't appear to be related to bleed as LA and hgb stable  - will not initiate anticoagulation after team had discussion of risks and benefits with daughter who understood the risks of not initiating anticoagulation  - CHADSVASC 4 indicating a 4.8% risk of stroke vs HAS BLED of 3 points indicating a 5.8% risk of bleeding   - pt finally accepting PO, IV metoprolol stopped, PO succinate started    Decreased POintake  - likely due to delirium in setting of advanced dementia  - improved today  - daughter doesn't think shed want NGT or PEG    Chronic prednisone use for sob?  - pt has been on prednisone for 3 years  - was on 10mg until she was tapered to 5mg recently  - follows op with Dr. Sousa  - hasn't received prednisone in several days due to refusing PO  - does not exhibit adrenal insufficency  - will start taper, d/w patient puylmonologist . will do slow taper     AAA  - pt noted to have a 3.9 AAA that has increased by 0.4 since 1 year ago  - given its size and rate of growth, will need op surveillance with an US  - avoid HTN    Chronic L2 compression fracture  - abdominal binder  - TLSO brace on discharge    AD  HTN  HLD  - unclear history of whether it was ischemic cardiomyopathy; no stents; no CHF  - on aspirin, statin and metoprolol at home  - asa on hold due to GI bleed     Pelvic fracture following fall  - treat pain with lidocaine patch and tramadol  - avoid NSAIDs  - pt follows with Dr. Coyne, family requesting eval as pt grimacing.     Glaucoma  - c/w timolol, dorzolamide, latanoprost    Advanced dementia  - not on any medications  - redirection  - daily orientation  - keep shades to window open  - haldol for agitation  - once pt is able to take po, can do trazodone    #proph  - vte: SCD  - gi: miralax and ppi  - fall and bleeding precautions  - CODE status: DNR/DNI as determined by her daughter who is her hcp as pt's advanced dementia precludes her from making an informed decision on her own    #Progress Note Handoff:  Pending (specify):  rate control. Pt now taking PO, hopeful rate control with metoprolol succinate with dc in 24-48 hours. still has sitter. daughter wants to take patient home although PT recommending facility.   Family discussion: as above with daughter  Disposition: Home_x__/SNF___/Other________/Unknown at this time________    follow up: sitter at bedside. pending clinical improvement due to agitation. monitor hgb. if persistent drop recall GI. prednisone taper

## 2023-07-05 NOTE — PROGRESS NOTE ADULT - SUBJECTIVE AND OBJECTIVE BOX
CHIEF COMPLAINT:    Patient is a 89y old  Female who presents with a chief complaint of Melena     (02 Jul 2023 15:27)      INTERVAL HPI/OVERNIGHT EVENTS:    Patient seen and examined at bedside. No acute overnight events occurred.  discussed with family present at bedside   1:1 present     ROS: Unable to obtain.     Medications:  Standing  acetaminophen     Tablet .. 1000 milliGRAM(s) Oral every 8 hours  dorzolamide 2% Ophthalmic Solution 1 Drop(s) Both EYES <User Schedule>  latanoprost 0.005% Ophthalmic Solution 1 Drop(s) Both EYES at bedtime  lidocaine   4% Patch 1 Patch Transdermal every 24 hours  metoprolol succinate ER 25 milliGRAM(s) Oral daily  pantoprazole  Injectable 40 milliGRAM(s) IV Push two times a day  polyethylene glycol 3350 17 Gram(s) Oral daily  predniSONE   Tablet 5 milliGRAM(s) Oral daily  senna 2 Tablet(s) Oral at bedtime  timolol 0.5% Solution 1 Drop(s) Both EYES every 12 hours    PRN Meds  morphine  - Injectable 1 milliGRAM(s) IV Push every 6 hours PRN  traMADol 50 milliGRAM(s) Oral every 6 hours PRN        Vital Signs:    Vital Signs Last 24 Hrs  T(C): 36.1 (05 Jul 2023 05:59), Max: 36.1 (05 Jul 2023 05:59)  T(F): 97 (05 Jul 2023 05:59), Max: 97 (05 Jul 2023 05:59)  HR: 69 (05 Jul 2023 05:59) (69 - 104)  BP: 152/83 (05 Jul 2023 05:59) (128/84 - 175/86)  BP(mean): 100 (04 Jul 2023 20:13) (100 - 100)  RR: 18 (05 Jul 2023 05:59) (16 - 18)  SpO2: --            PHYSICAL EXAM:  GENERAL:  NAD  SKIN: No rashes or lesions  HEENT: Atraumatic. Normocephalic. Anicteric  NECK:  No JVD.   PULMONARY: Clear to ausculation bilaterally. No wheezing. No rales  CVS: Normal S1, S2. Regular rate and rhythm. No murmurs.  ABDOMEN/GI: Soft, Nontender, Nondistended; Bowel sounds are present  EXTREMITIES:  No edema B/L LE.  NEUROLOGIC:  No motor deficit.  PSYCH: Alert & oriented x 3, normal affect      LABS:               LABS:                        9.3    8.37  )-----------( 173      ( 05 Jul 2023 07:09 )             27.7     07-05    142  |  106  |  29<H>  ----------------------------<  122<H>  3.7   |  24  |  0.8    Ca    9.0      05 Jul 2023 07:09  Phos  2.6     07-05  Mg     2.0     07-05                        RADIOLOGY & ADDITIONAL TESTS:  Imaging or report Personally Reviewed:  [ ] YES  [ ] NO -->no new images    Telemetry reviewed independently - NSR, no acute events  EKG reviewed independently -->no new EKGs    Consultant(s) Notes Reviewed:  [ ] YES  [ ] NO  Care Discussed with Consultants/Other Providers [ ] YES  [ ] NO    Case discussed with resident  Care discussed with pt

## 2023-07-06 LAB
ANION GAP SERPL CALC-SCNC: 10 MMOL/L — SIGNIFICANT CHANGE UP (ref 7–14)
BUN SERPL-MCNC: 39 MG/DL — HIGH (ref 10–20)
CALCIUM SERPL-MCNC: 8.8 MG/DL — SIGNIFICANT CHANGE UP (ref 8.4–10.5)
CHLORIDE SERPL-SCNC: 105 MMOL/L — SIGNIFICANT CHANGE UP (ref 98–110)
CO2 SERPL-SCNC: 23 MMOL/L — SIGNIFICANT CHANGE UP (ref 17–32)
CREAT SERPL-MCNC: 1 MG/DL — SIGNIFICANT CHANGE UP (ref 0.7–1.5)
EGFR: 53 ML/MIN/1.73M2 — LOW
GLUCOSE BLDC GLUCOMTR-MCNC: 205 MG/DL — HIGH (ref 70–99)
GLUCOSE SERPL-MCNC: 141 MG/DL — HIGH (ref 70–99)
HCT VFR BLD CALC: 25.4 % — LOW (ref 37–47)
HCT VFR BLD CALC: 25.6 % — LOW (ref 37–47)
HGB BLD-MCNC: 8.2 G/DL — LOW (ref 12–16)
HGB BLD-MCNC: 8.4 G/DL — LOW (ref 12–16)
MAGNESIUM SERPL-MCNC: 1.9 MG/DL — SIGNIFICANT CHANGE UP (ref 1.8–2.4)
MCHC RBC-ENTMCNC: 30.5 PG — SIGNIFICANT CHANGE UP (ref 27–31)
MCHC RBC-ENTMCNC: 31.2 PG — HIGH (ref 27–31)
MCHC RBC-ENTMCNC: 32.3 G/DL — SIGNIFICANT CHANGE UP (ref 32–37)
MCHC RBC-ENTMCNC: 32.8 G/DL — SIGNIFICANT CHANGE UP (ref 32–37)
MCV RBC AUTO: 94.4 FL — SIGNIFICANT CHANGE UP (ref 81–99)
MCV RBC AUTO: 95.2 FL — SIGNIFICANT CHANGE UP (ref 81–99)
NRBC # BLD: 0 /100 WBCS — SIGNIFICANT CHANGE UP (ref 0–0)
NRBC # BLD: 0 /100 WBCS — SIGNIFICANT CHANGE UP (ref 0–0)
PHOSPHATE SERPL-MCNC: 3.1 MG/DL — SIGNIFICANT CHANGE UP (ref 2.1–4.9)
PLATELET # BLD AUTO: 172 K/UL — SIGNIFICANT CHANGE UP (ref 130–400)
PLATELET # BLD AUTO: 176 K/UL — SIGNIFICANT CHANGE UP (ref 130–400)
PMV BLD: 10.4 FL — SIGNIFICANT CHANGE UP (ref 7.4–10.4)
PMV BLD: 10.5 FL — HIGH (ref 7.4–10.4)
POTASSIUM SERPL-MCNC: 4.1 MMOL/L — SIGNIFICANT CHANGE UP (ref 3.5–5)
POTASSIUM SERPL-SCNC: 4.1 MMOL/L — SIGNIFICANT CHANGE UP (ref 3.5–5)
RBC # BLD: 2.69 M/UL — LOW (ref 4.2–5.4)
RBC # BLD: 2.69 M/UL — LOW (ref 4.2–5.4)
RBC # FLD: 15.2 % — HIGH (ref 11.5–14.5)
RBC # FLD: 15.4 % — HIGH (ref 11.5–14.5)
SODIUM SERPL-SCNC: 138 MMOL/L — SIGNIFICANT CHANGE UP (ref 135–146)
WBC # BLD: 9.63 K/UL — SIGNIFICANT CHANGE UP (ref 4.8–10.8)
WBC # BLD: 9.65 K/UL — SIGNIFICANT CHANGE UP (ref 4.8–10.8)
WBC # FLD AUTO: 9.63 K/UL — SIGNIFICANT CHANGE UP (ref 4.8–10.8)
WBC # FLD AUTO: 9.65 K/UL — SIGNIFICANT CHANGE UP (ref 4.8–10.8)

## 2023-07-06 PROCEDURE — 99232 SBSQ HOSP IP/OBS MODERATE 35: CPT

## 2023-07-06 RX ORDER — PANTOPRAZOLE SODIUM 20 MG/1
40 TABLET, DELAYED RELEASE ORAL
Refills: 0 | Status: DISCONTINUED | OUTPATIENT
Start: 2023-07-06 | End: 2023-07-10

## 2023-07-06 RX ADMIN — TRAMADOL HYDROCHLORIDE 50 MILLIGRAM(S): 50 TABLET ORAL at 13:58

## 2023-07-06 RX ADMIN — DORZOLAMIDE HYDROCHLORIDE 1 DROP(S): 20 SOLUTION/ DROPS OPHTHALMIC at 17:31

## 2023-07-06 RX ADMIN — Medication 1 DROP(S): at 17:30

## 2023-07-06 RX ADMIN — Medication 25 MILLIGRAM(S): at 06:53

## 2023-07-06 RX ADMIN — Medication 1 DROP(S): at 06:53

## 2023-07-06 RX ADMIN — LIDOCAINE 1 PATCH: 4 CREAM TOPICAL at 09:13

## 2023-07-06 RX ADMIN — LATANOPROST 1 DROP(S): 0.05 SOLUTION/ DROPS OPHTHALMIC; TOPICAL at 22:30

## 2023-07-06 RX ADMIN — DORZOLAMIDE HYDROCHLORIDE 1 DROP(S): 20 SOLUTION/ DROPS OPHTHALMIC at 06:49

## 2023-07-06 RX ADMIN — PANTOPRAZOLE SODIUM 40 MILLIGRAM(S): 20 TABLET, DELAYED RELEASE ORAL at 06:52

## 2023-07-06 RX ADMIN — LIDOCAINE 1 PATCH: 4 CREAM TOPICAL at 09:14

## 2023-07-06 RX ADMIN — Medication 4 MILLIGRAM(S): at 06:52

## 2023-07-06 RX ADMIN — POLYETHYLENE GLYCOL 3350 17 GRAM(S): 17 POWDER, FOR SOLUTION ORAL at 12:28

## 2023-07-06 RX ADMIN — PANTOPRAZOLE SODIUM 40 MILLIGRAM(S): 20 TABLET, DELAYED RELEASE ORAL at 17:32

## 2023-07-06 RX ADMIN — TRAMADOL HYDROCHLORIDE 50 MILLIGRAM(S): 50 TABLET ORAL at 12:28

## 2023-07-06 NOTE — PROGRESS NOTE ADULT - SUBJECTIVE AND OBJECTIVE BOX
Gastroenterology Follow Up Note    Location: [address] ([address]) [address] ([address]  Patient Name: KIN HERNANDEZ  Age: 89y  Gender: Female      Chief Complaint  Patient is a 89y old Female who presents with a chief complaint of Melena  Primary diagnosis of Melena      Reason for Consult  Reported melena         Progress Note  This morning patient was seen and examined at bedside.    Today is hospital day 6d.  Patient had another reported episode of melena on 07/05.  She denies abdominal pain, nausea, vomiting, or light headedness.      Past Medical and Surgical History:  CAD (coronary artery disease)    HTN (hypertension)      PAST MEDICAL & SURGICAL HISTORY:  CAD (coronary artery disease)      HTN (hypertension)          Home Medications:  Home Medications:  acetaminophen 325 mg oral tablet: 2 tab(s) orally every 6 hours (21 Jun 2022 12:32)  atorvastatin 10 mg tablet:  (14 Jun 2022 01:26)  brimonidine 0.15 % eye drops: 1 milliliter(s) to each affected eye 3 times a day (21 Jun 2022 12:32)  furosemide 20 mg tablet:  (14 Jun 2022 01:26)  gabapentin 250 mg/5 mL oral solution: 2 milliliter(s) orally 3 times a day (21 Jun 2022 12:32)  latanoprost 0.005 % eye drops: 1 milliliter(s) to each affected eye once a day (at bedtime) (21 Jun 2022 12:32)  METOPROLOL SUCC ER 25 MG TAB: 1 tab(s) orally once a day (21 Jun 2022 12:32)  PREDNISONE 5 MG TABLET: 1 tab(s) orally once a day (21 Jun 2022 12:32)  valsartan 80 mg tablet:  (14 Jun 2022 01:26)        Vital Signs in the last 24 hours   Vitals Summary T(C): 36.4 (07-06-23 @ 12:58), Max: 37 (07-06-23 @ 06:40)  HR: 107 (07-06-23 @ 12:58) (73 - 116)  BP: 101/75 (07-06-23 @ 12:58) (101/75 - 128/76)  RR: 17 (07-06-23 @ 12:58) (17 - 18)  SpO2: --  Vent Data   Intake/ Output   07-05-23 @ 07:01  -  07-06-23 @ 07:00  --------------------------------------------------------  IN: 360 mL / OUT: 0 mL / NET: 360 mL    07-06-23 @ 07:01  -  07-06-23 @ 18:14  --------------------------------------------------------  IN: 118 mL / OUT: 300 mL / NET: -182 mL      Physical Exam  * General Appearance: AOx1  * Head: Normocephalic, without obvious abnormality, atraumatic  * Lungs: Respirations unlabored, Good bilateral air entry, normal breath sounds   * Heart: irregular Rate and Rhythm  * Abdomen: Symmetric, non-distended, soft, non-tender, bowel sounds active all four quadrants, no masses, no organomegaly (no hepatosplenomegaly)  * JONG dark brown stool      Investigations   Laboratory Workup      - CBC:                        8.4    9.63  )-----------( 176      ( 06 Jul 2023 13:12 )             25.6       - Hgb Trend:  8.4  07-06-23 @ 13:12  8.2  07-06-23 @ 08:31  9.3  07-05-23 @ 07:09  8.9  07-04-23 @ 05:55          - Chemistry:  07-06    138  |  105  |  39<H>  ----------------------------<  141<H>  4.1   |  23  |  1.0    Ca    8.8      06 Jul 2023 08:31  Phos  3.1     07-06  Mg     1.9     07-06      Liver panel trend:  TBili 1.3   /   AST 28   /   ALT 17   /   AlkP 57   /   Tptn 5.3   /   Alb 3.4    /   DBili --      07-03  TBili 1.3   /   AST 29   /   ALT 17   /   AlkP 59   /   Tptn 5.3   /   Alb 3.4    /   DBili --      07-02  TBili 0.8   /   AST 22   /   ALT 14   /   AlkP 54   /   Tptn 5.1   /   Alb 3.2    /   DBili --      07-01  TBili 0.7   /   AST 13   /   ALT 13   /   AlkP 61   /   Tptn 5.7   /   Alb 3.6    /   DBili --      06-29        Microbiological Workup  Urinalysis Basic - ( 06 Jul 2023 08:31 )    Color: x / Appearance: x / SG: x / pH: x  Gluc: 141 mg/dL / Ketone: x  / Bili: x / Urobili: x   Blood: x / Protein: x / Nitrite: x   Leuk Esterase: x / RBC: x / WBC x   Sq Epi: x / Non Sq Epi: x / Bacteria: x          Current Medications  Standing Medications  acetaminophen     Tablet .. 1000 milliGRAM(s) Oral every 8 hours  dorzolamide 2% Ophthalmic Solution 1 Drop(s) Both EYES <User Schedule>  latanoprost 0.005% Ophthalmic Solution 1 Drop(s) Both EYES at bedtime  lidocaine   4% Patch 1 Patch Transdermal every 24 hours  metoprolol succinate ER 25 milliGRAM(s) Oral daily  multivitamin 1 Tablet(s) Oral daily  pantoprazole    Tablet 40 milliGRAM(s) Oral two times a day  polyethylene glycol 3350 17 Gram(s) Oral daily  predniSONE   Tablet 4 milliGRAM(s) Oral daily  senna 2 Tablet(s) Oral at bedtime  timolol 0.5% Solution 1 Drop(s) Both EYES every 12 hours    PRN Medications  morphine  - Injectable 1 milliGRAM(s) IV Push every 6 hours PRN Severe Pain (7 - 10)  traMADol 50 milliGRAM(s) Oral every 6 hours PRN Moderate Pain (4 - 6)    Singles Doses Administered  (ADM OVERRIDE) 1 each &lt;see task&gt; GiveOnce  (ADM OVERRIDE) 1 each &lt;see task&gt; GiveOnce  (ADM OVERRIDE) 1 each &lt;see task&gt; GiveOnce  (ADM OVERRIDE) 1 each &lt;see task&gt; GiveOnce  haloperidol    Injectable 5 milliGRAM(s) IntraMuscular Once  haloperidol    Injectable 1 milliGRAM(s) IntraMuscular once  magnesium sulfate  IVPB 2 Gram(s) IV Intermittent once  metoprolol tartrate Injectable 5 milliGRAM(s) IV Push once  metoprolol tartrate Injectable 5 milliGRAM(s) IV Push once  metoprolol tartrate Injectable 5 milliGRAM(s) IV Push once  midazolam Injectable 1 milliGRAM(s) IV Push Once  midazolam Injectable 1 milliGRAM(s) IV Push Once  morphine  - Injectable 1 milliGRAM(s) IV Push once  morphine  - Injectable 4 milliGRAM(s) IV Push Once  pantoprazole  Injectable 40 milliGRAM(s) IV Push Once  potassium chloride   Powder 40 milliEquivalent(s) Oral once  predniSONE   Tablet 5 milliGRAM(s) Oral once  sodium chloride 0.9% Bolus 1000 milliLiter(s) IV Bolus once

## 2023-07-06 NOTE — SWALLOW BEDSIDE ASSESSMENT ADULT - COMMENTS
Pt. is known to  services 6/21/22 with recommendations for a soft & bite sized diet w/ thin liquids.    RN reports poor PO intake and generalized weakness.

## 2023-07-06 NOTE — PROGRESS NOTE ADULT - ASSESSMENT
Assessment and Plan  Case of a 89 yo F with a PMH of HTN, HLD, CAD, Dementia(A&O1 at baseline) present for weakness and abdominal pain that started this morning. Daughter reports dark stool since yesterday and lower abdominal pain. history was obtained form the daughter at bedside. Daughter denies any vomiting, hematochezia, hematemesis or weight loss. Patient is hemodynamically stable, Hemoglobin on admission is 9.4 (baseline 10-11). labs significant for elevated lactate and leukocytosis. GI consulted to r/o melena.      Reported Melena on 06/30 and 07/05  Anemia  * Hb 9.4 on admission 06/29 -> 06/30 Hb 7.4 s/p 1 unit pRBC -> 07/03 9.3 -> 07/06 8.2/8.4  * JONG dark brown stool 07/06  * Hemodynamically stable    PLAN:   - Repeat CBC in PM noted: stable. Monitor CBC, transfuse to keep hemoglobin > 8.   - After discussion with Patient's daughter, she prefers to avoid any endoscopy or anesthesia requiring procedure since it is not lifesaving  - Will recommend to continue with conservative measures for now  - Monitor BM   - Continue PPI PO BID  - Avoid NSAIDs  - Continue soft bite sized diet      Elevated T bilirubin  * T bili 0.7 on 06/29 -> 07/02 and 07/03 1.3 -> no repeat  * ALP 59, AST 29, aLT 17 07/02  * CT AP IC 06/30 noted with no hepatobiliary findings     PLAN  - Trend LFTs for now  - Consider RUQ US if LFTs worsen  - Avoid hepatotoxic agents      Thank you for your consult.  - Please note that plan was communicated with medical team.   - Please reach GI on [phone] during weekdays till 5pm.  - Please call the GI service line after 5pm on Weekdays and anytime on Weekends: [phone].      Tayo Funes MD  PGY - 4 Gastroenterology Fellow   [facility]     Assessment and Plan  Case of a 89 yo F with a PMH of HTN, HLD, CAD, Dementia(A&O1 at baseline) present for weakness and abdominal pain that started this morning. Daughter reports dark stool since yesterday and lower abdominal pain. history was obtained form the daughter at bedside. Daughter denies any vomiting, hematochezia, hematemesis or weight loss. Patient is hemodynamically stable, Hemoglobin on admission is 9.4 (baseline 10-11). labs significant for elevated lactate and leukocytosis. GI consulted to r/o melena.      Reported Melena on 06/30 and 07/05  Anemia  * Hb 9.4 on admission 06/29 -> 06/30 Hb 7.4 s/p 1 unit pRBC -> 07/03 9.3 -> 07/06 8.2/8.4, lactate normalized  * JONG dark brown stool 07/06  * Hemodynamically stable    PLAN:   - Repeat CBC in PM noted: stable. Monitor CBC, transfuse to keep hemoglobin > 8.   - After discussion with Patient's daughter, she prefers to avoid any endoscopy or anesthesia requiring procedure since it is not lifesaving  - Will recommend to continue with conservative measures for now  - Monitor BM   - Continue PPI PO BID  - Avoid NSAIDs  - Continue soft bite sized diet      Elevated T bilirubin  * T bili 0.7 on 06/29 -> 07/02 and 07/03 1.3 -> no repeat  * ALP 59, AST 29, aLT 17 07/02  * CT AP IC 06/30 noted with no hepatobiliary findings     PLAN  - Trend LFTs for now  - Consider RUQ US if LFTs worsen  - Avoid hepatotoxic agents      Thank you for your consult.  - Please note that plan was communicated with medical team.   - Please reach GI on [phone] during weekdays till 5pm.  - Please call the GI service line after 5pm on Weekdays and anytime on Weekends: [phone].      Tayo Funes MD  PGY - 4 Gastroenterology Fellow   [facility]

## 2023-07-06 NOTE — PROGRESS NOTE ADULT - SUBJECTIVE AND OBJECTIVE BOX
CHIEF COMPLAINT:    Patient is a 89y old  Female who presents with a chief complaint of Melena     (02 Jul 2023 15:27)      INTERVAL HPI/OVERNIGHT EVENTS:    Patient seen and examined at bedside.  as per daughter patient had one episode of black stool?  speech consulted   1:1 present   discussed in length with daughter.     ROS: Unable to obtain.     Medications:  Standing  acetaminophen     Tablet .. 1000 milliGRAM(s) Oral every 8 hours  dorzolamide 2% Ophthalmic Solution 1 Drop(s) Both EYES <User Schedule>  latanoprost 0.005% Ophthalmic Solution 1 Drop(s) Both EYES at bedtime  lidocaine   4% Patch 1 Patch Transdermal every 24 hours  metoprolol succinate ER 25 milliGRAM(s) Oral daily  pantoprazole  Injectable 40 milliGRAM(s) IV Push two times a day  polyethylene glycol 3350 17 Gram(s) Oral daily  predniSONE   Tablet 5 milliGRAM(s) Oral daily  senna 2 Tablet(s) Oral at bedtime  timolol 0.5% Solution 1 Drop(s) Both EYES every 12 hours    PRN Meds  morphine  - Injectable 1 milliGRAM(s) IV Push every 6 hours PRN  traMADol 50 milliGRAM(s) Oral every 6 hours PRN        Vital Signs:    Vital Signs Last 24 Hrs  T(C): 37 (06 Jul 2023 06:40), Max: 37 (06 Jul 2023 06:40)  T(F): 98.6 (06 Jul 2023 06:40), Max: 98.6 (06 Jul 2023 06:40)  HR: 116 (06 Jul 2023 06:40) (73 - 116)  BP: 128/76 (06 Jul 2023 06:40) (105/56 - 128/76)  BP(mean): --  RR: 18 (06 Jul 2023 06:40) (18 - 18)  SpO2: --                PHYSICAL EXAM:  GENERAL:  NAD  SKIN: No rashes or lesions  HEENT: Atraumatic. Normocephalic. Anicteric  NECK:  No JVD.   PULMONARY: Clear to ausculation bilaterally. No wheezing. No rales  CVS: Normal S1, S2. Regular rate and rhythm. No murmurs.  ABDOMEN/GI: Soft, Nontender, Nondistended; Bowel sounds are present  EXTREMITIES:  No edema B/L LE.      LABS:                 LABS:                        8.2    9.65  )-----------( 172      ( 06 Jul 2023 08:31 )             25.4     07-06    138  |  105  |  39<H>  ----------------------------<  141<H>  4.1   |  23  |  1.0    Ca    8.8      06 Jul 2023 08:31  Phos  3.1     07-06  Mg     1.9     07-06                                  RADIOLOGY & ADDITIONAL TESTS:  Imaging or report Personally Reviewed:  [ ] YES  [ ] NO -->no new images    Telemetry reviewed independently - NSR, no acute events  EKG reviewed independently -->no new EKGs    Consultant(s) Notes Reviewed:  [ ] YES  [ ] NO  Care Discussed with Consultants/Other Providers [ ] YES  [ ] NO    Case discussed with resident  Care discussed with pt

## 2023-07-06 NOTE — SWALLOW BEDSIDE ASSESSMENT ADULT - SWALLOW EVAL: DIAGNOSIS
Mod oral dysphagia with soft & bite sized and minced & moist impacted by cognition. + tolerance for puree and thin liquids without overt s/s of penetration/ aspiration. Mod oral dysphagia with soft & bite sized and minced & moist consistencies negatively impacted by cognition. + tolerance for puree and thin liquids without overt s/s of penetration/ aspiration.

## 2023-07-06 NOTE — SWALLOW BEDSIDE ASSESSMENT ADULT - SLP GENERAL OBSERVATIONS
Pt. received at bedside, awake, alert, confused, ox1 (self), room air. Patient's daughter at bedside served as Polish .

## 2023-07-06 NOTE — SWALLOW BEDSIDE ASSESSMENT ADULT - SLP PERTINENT HISTORY OF CURRENT PROBLEM
89 yo Female with PMH of HTN, HLD, CAD, and advanced dementia. Presents for evaluation of in the setting of NSAID and steroid use. UGIB presenting with melena and anemia, Likely due to possible PUD. CXR WFL.

## 2023-07-06 NOTE — SWALLOW BEDSIDE ASSESSMENT ADULT - CONSISTENCIES ADMINISTERED
2oz water, 1oz soft & bite, 2oz minced & moist, 2oz puree./thin liquid/pureed/minced & moist/soft & bite-sized

## 2023-07-06 NOTE — SWALLOW BEDSIDE ASSESSMENT ADULT - NS SPL SWALLOW CLINIC TRIAL FT
Mod oral dysphagia with soft & bite sized and minced & moist characterized by +expectoration of trials  negatively impacted by cognition. + tolerance for puree and thin liquids without overt s/s of penetration/ aspiration. Mod oral dysphagia with soft & bite sized and minced & moist characterized by +expectoration of PO trials negatively impacted by cognition. + tolerance for puree and thin liquids without overt s/s of penetration/ aspiration.

## 2023-07-06 NOTE — PROGRESS NOTE ADULT - ASSESSMENT
89 yo F PMH of HTN, HLD, CAD, and advanced dementia presented for evaluation of in the setting of NSAID and steroid use.      UGIB presenting with melena and anemia,   Likely due to possible PUD   - Labs show a leukocytosis of 14k, Hb of 9.4 from a baseline of 12g/dl, lactate 2.9, BUN/Cr 56/0.7 (also on steroids). now improved   - pt has been taking meloxicam since her pelvic fracture about 2 weeks in early 6/2023  - has been on chronic prednisone 5mg for 3 years  - does not take a ppi at home and has been having epigastric pain the past 2 weeks  - denies use of pepto bismol and iron tablets  - no previous cscope or EGD; no FH of CRC  - CTAP: no active bleeding noted  - 2 large bore IVs  - PPI 40mg bid switched to PPI drip, changed back to BID dose  - resumed metoprolol but started with 5mg metoprolol IVP as pt refusing po, now on oral only   - last LA from 7/1 was normal aat 1.8  - repeat lactate 1.8  - hgb stable, diet advanced  - GI recs appreciated  - daughter is open to egd/colonoscopy if necesarry but since hgb now stable wants to hold off  - transfuse if Hb <8 due to CAD  - hold aspirin  - hold NSAIDs  - SCDs  - bleeding and fall precautions  - received 1 unit prbc on 6/30  - follow repeat hgb at 11 AM. there is a drop in hgb of 1 gram. follow GI recommendations. patient may need endoscopy if daughter agrees to it     Afib with rvr, new vs paroxysmal  - metoprolol started, tachycardia doesn't appear to be related to bleed as LA and hgb stable  - will not initiate anticoagulation after team had discussion of risks and benefits with daughter who understood the risks of not initiating anticoagulation  - CHADSVASC 4 indicating a 4.8% risk of stroke vs HAS BLED of 3 points indicating a 5.8% risk of bleeding   - pt finally accepting PO, IV metoprolol stopped, PO succinate started    Decreased POintake  - likely due to delirium in setting of advanced dementia  - improved today  - daughter doesn't think shed want NGT or PEG  nutrition consulted recommended speech. speech consult pending      Chronic prednisone use for sob?  - pt has been on prednisone for 3 years  - was on 10mg until she was tapered to 5mg recently  - follows op with Dr. Sousa  - hasn't received prednisone in several days due to refusing PO  - does not exhibit adrenal insufficency  - will start taper, d/w patient pulmonologist . will do slow taper     AAA  - pt noted to have a 3.9 AAA that has increased by 0.4 since 1 year ago  - given its size and rate of growth, will need op surveillance with an US  - avoid HTN    Chronic L2 compression fracture  - abdominal binder  - TLSO brace on discharge    AD  HTN  HLD  - unclear history of whether it was ischemic cardiomyopathy; no stents; no CHF  - on aspirin, statin and metoprolol at home  - asa on hold due to GI bleed     Pelvic fracture following fall  - treat pain with lidocaine patch and tramadol  - avoid NSAIDs  - pt follows with Dr. Coyne, family requesting eval as pt grimacing.     Glaucoma  - c/w timolol, dorzolamide, latanoprost    Advanced dementia  - not on any medications  - redirection  - daily orientation  - keep shades to window open  - haldol for agitation  - once pt is able to take po, can do trazodone    #proph  - vte: SCD  - gi: miralax and ppi  - fall and bleeding precautions  - CODE status: DNR/DNI as determined by her daughter who is her hcp as pt's advanced dementia precludes her from making an informed decision on her own    #Progress Note Handoff:  Pending (specify):  rate control. Pt now taking PO, hopeful rate control with metoprolol succinate still has sitter. daughter wants to take patient home although PT recommending facility. now working up for GI bleed. follow GI recommendations.   Family discussion: as above with daughter  Disposition: Home_x__/SNF___/Other________/Unknown at this time________    follow up: sitter at bedside. pending clinical improvement due to agitation. monitor hgb. pending GI. may need endoscopy if daughter agrees and final plan as per GI.  prednisone taper, follow speech     Patient is high risk of clinical deterioration and demise. transfer [facility] stepdown/ICU in case of any clinical deterioration. daughter at bedside. she is aware of patient condition.

## 2023-07-07 ENCOUNTER — APPOINTMENT (OUTPATIENT)
Dept: PAIN MANAGEMENT | Facility: CLINIC | Age: 88
End: 2023-07-07

## 2023-07-07 LAB
ANION GAP SERPL CALC-SCNC: 10 MMOL/L — SIGNIFICANT CHANGE UP (ref 7–14)
APPEARANCE UR: CLEAR — SIGNIFICANT CHANGE UP
BILIRUB UR-MCNC: NEGATIVE — SIGNIFICANT CHANGE UP
BLD GP AB SCN SERPL QL: SIGNIFICANT CHANGE UP
BUN SERPL-MCNC: 35 MG/DL — HIGH (ref 10–20)
CALCIUM SERPL-MCNC: 8.6 MG/DL — SIGNIFICANT CHANGE UP (ref 8.4–10.4)
CHLORIDE SERPL-SCNC: 106 MMOL/L — SIGNIFICANT CHANGE UP (ref 98–110)
CO2 SERPL-SCNC: 24 MMOL/L — SIGNIFICANT CHANGE UP (ref 17–32)
COLOR SPEC: YELLOW — SIGNIFICANT CHANGE UP
CREAT SERPL-MCNC: 0.8 MG/DL — SIGNIFICANT CHANGE UP (ref 0.7–1.5)
DIFF PNL FLD: NEGATIVE — SIGNIFICANT CHANGE UP
EGFR: 69 ML/MIN/1.73M2 — SIGNIFICANT CHANGE UP
GLUCOSE SERPL-MCNC: 135 MG/DL — HIGH (ref 70–99)
GLUCOSE UR QL: NEGATIVE — SIGNIFICANT CHANGE UP
HCT VFR BLD CALC: 25.3 % — LOW (ref 37–47)
HGB BLD-MCNC: 8.2 G/DL — LOW (ref 12–16)
KETONES UR-MCNC: NEGATIVE — SIGNIFICANT CHANGE UP
LEUKOCYTE ESTERASE UR-ACNC: NEGATIVE — SIGNIFICANT CHANGE UP
MAGNESIUM SERPL-MCNC: 2 MG/DL — SIGNIFICANT CHANGE UP (ref 1.8–2.4)
MCHC RBC-ENTMCNC: 30.8 PG — SIGNIFICANT CHANGE UP (ref 27–31)
MCHC RBC-ENTMCNC: 32.4 G/DL — SIGNIFICANT CHANGE UP (ref 32–37)
MCV RBC AUTO: 95.1 FL — SIGNIFICANT CHANGE UP (ref 81–99)
NITRITE UR-MCNC: NEGATIVE — SIGNIFICANT CHANGE UP
NRBC # BLD: 0 /100 WBCS — SIGNIFICANT CHANGE UP (ref 0–0)
PH UR: 5.5 — SIGNIFICANT CHANGE UP (ref 5–8)
PLATELET # BLD AUTO: 172 K/UL — SIGNIFICANT CHANGE UP (ref 130–400)
PMV BLD: 10.1 FL — SIGNIFICANT CHANGE UP (ref 7.4–10.4)
POTASSIUM SERPL-MCNC: 4.2 MMOL/L — SIGNIFICANT CHANGE UP (ref 3.5–5)
POTASSIUM SERPL-SCNC: 4.2 MMOL/L — SIGNIFICANT CHANGE UP (ref 3.5–5)
PROT UR-MCNC: SIGNIFICANT CHANGE UP
RBC # BLD: 2.66 M/UL — LOW (ref 4.2–5.4)
RBC # FLD: 15.2 % — HIGH (ref 11.5–14.5)
SODIUM SERPL-SCNC: 140 MMOL/L — SIGNIFICANT CHANGE UP (ref 135–146)
SP GR SPEC: 1.02 — SIGNIFICANT CHANGE UP (ref 1.01–1.03)
UROBILINOGEN FLD QL: ABNORMAL
WBC # BLD: 8.49 K/UL — SIGNIFICANT CHANGE UP (ref 4.8–10.8)
WBC # FLD AUTO: 8.49 K/UL — SIGNIFICANT CHANGE UP (ref 4.8–10.8)

## 2023-07-07 PROCEDURE — 99232 SBSQ HOSP IP/OBS MODERATE 35: CPT

## 2023-07-07 PROCEDURE — 73521 X-RAY EXAM HIPS BI 2 VIEWS: CPT | Mod: 26

## 2023-07-07 RX ORDER — TRAMADOL HYDROCHLORIDE 50 MG/1
25 TABLET ORAL AT BEDTIME
Refills: 0 | Status: DISCONTINUED | OUTPATIENT
Start: 2023-07-07 | End: 2023-07-12

## 2023-07-07 RX ADMIN — TRAMADOL HYDROCHLORIDE 25 MILLIGRAM(S): 50 TABLET ORAL at 23:30

## 2023-07-07 RX ADMIN — Medication 25 MILLIGRAM(S): at 08:57

## 2023-07-07 RX ADMIN — MORPHINE SULFATE 1 MILLIGRAM(S): 50 CAPSULE, EXTENDED RELEASE ORAL at 03:54

## 2023-07-07 RX ADMIN — TRAMADOL HYDROCHLORIDE 25 MILLIGRAM(S): 50 TABLET ORAL at 22:50

## 2023-07-07 RX ADMIN — Medication 4 MILLIGRAM(S): at 08:56

## 2023-07-07 RX ADMIN — Medication 1 TABLET(S): at 11:26

## 2023-07-07 RX ADMIN — PANTOPRAZOLE SODIUM 40 MILLIGRAM(S): 20 TABLET, DELAYED RELEASE ORAL at 17:49

## 2023-07-07 RX ADMIN — PANTOPRAZOLE SODIUM 40 MILLIGRAM(S): 20 TABLET, DELAYED RELEASE ORAL at 08:57

## 2023-07-07 RX ADMIN — MORPHINE SULFATE 1 MILLIGRAM(S): 50 CAPSULE, EXTENDED RELEASE ORAL at 04:42

## 2023-07-07 RX ADMIN — LATANOPROST 1 DROP(S): 0.05 SOLUTION/ DROPS OPHTHALMIC; TOPICAL at 22:32

## 2023-07-07 RX ADMIN — POLYETHYLENE GLYCOL 3350 17 GRAM(S): 17 POWDER, FOR SOLUTION ORAL at 11:26

## 2023-07-07 RX ADMIN — DORZOLAMIDE HYDROCHLORIDE 1 DROP(S): 20 SOLUTION/ DROPS OPHTHALMIC at 08:57

## 2023-07-07 RX ADMIN — LIDOCAINE 1 PATCH: 4 CREAM TOPICAL at 19:00

## 2023-07-07 RX ADMIN — TRAMADOL HYDROCHLORIDE 50 MILLIGRAM(S): 50 TABLET ORAL at 11:00

## 2023-07-07 RX ADMIN — Medication 1 DROP(S): at 17:49

## 2023-07-07 RX ADMIN — LIDOCAINE 1 PATCH: 4 CREAM TOPICAL at 20:12

## 2023-07-07 RX ADMIN — SENNA PLUS 2 TABLET(S): 8.6 TABLET ORAL at 22:33

## 2023-07-07 RX ADMIN — DORZOLAMIDE HYDROCHLORIDE 1 DROP(S): 20 SOLUTION/ DROPS OPHTHALMIC at 17:48

## 2023-07-07 RX ADMIN — Medication 1 DROP(S): at 08:57

## 2023-07-07 RX ADMIN — TRAMADOL HYDROCHLORIDE 50 MILLIGRAM(S): 50 TABLET ORAL at 12:00

## 2023-07-07 NOTE — PROGRESS NOTE ADULT - SUBJECTIVE AND OBJECTIVE BOX
CHIEF COMPLAINT:    Patient is a 89y old  Female who presents with a chief complaint of Melena     (02 Jul 2023 15:27)      INTERVAL HPI/OVERNIGHT EVENTS:    Patient seen and examined at bedside.   speech consulted   1:1 present   discussed in length with daughter.   patient still mentioning hip pain discussed with daughter regarding cutting down on IV morphine. agreement with plan.     ROS: Unable to obtain.     Medications:  Standing  acetaminophen     Tablet .. 1000 milliGRAM(s) Oral every 8 hours  dorzolamide 2% Ophthalmic Solution 1 Drop(s) Both EYES <User Schedule>  latanoprost 0.005% Ophthalmic Solution 1 Drop(s) Both EYES at bedtime  lidocaine   4% Patch 1 Patch Transdermal every 24 hours  metoprolol succinate ER 25 milliGRAM(s) Oral daily  pantoprazole  Injectable 40 milliGRAM(s) IV Push two times a day  polyethylene glycol 3350 17 Gram(s) Oral daily  predniSONE   Tablet 5 milliGRAM(s) Oral daily  senna 2 Tablet(s) Oral at bedtime  timolol 0.5% Solution 1 Drop(s) Both EYES every 12 hours    PRN Meds  morphine  - Injectable 1 milliGRAM(s) IV Push every 6 hours PRN  traMADol 50 milliGRAM(s) Oral every 6 hours PRN        Vital Signs:    Vital Signs Last 24 Hrs  T(C): 36 (07 Jul 2023 13:22), Max: 36 (07 Jul 2023 05:04)  T(F): 96.8 (07 Jul 2023 13:22), Max: 96.8 (07 Jul 2023 05:04)  HR: 105 (07 Jul 2023 13:22) (103 - 127)  BP: 136/60 (07 Jul 2023 13:22) (122/75 - 136/60)  BP(mean): 91 (06 Jul 2023 20:19) (91 - 91)  RR: 18 (07 Jul 2023 05:04) (18 - 18)  SpO2: --                    PHYSICAL EXAM:  GENERAL:  NAD  SKIN: No rashes or lesions  HEENT: Atraumatic. Normocephalic. Anicteric  NECK:  No JVD.   PULMONARY: Clear to ausculation bilaterally. No wheezing. No rales  CVS: Normal S1, S2. Regular rate and rhythm. No murmurs.  ABDOMEN/GI: Soft, Nontender, Nondistended; Bowel sounds are present  EXTREMITIES:  No edema B/L LE.      LABS:                 LABS:                        8.2    8.49  )-----------( 172      ( 07 Jul 2023 05:48 )             25.3     07-07    140  |  106  |  35<H>  ----------------------------<  135<H>  4.2   |  24  |  0.8    Ca    8.6      07 Jul 2023 05:48  Phos  3.1     07-06  Mg     2.0     07-07                                            RADIOLOGY & ADDITIONAL TESTS:  Imaging or report Personally Reviewed:  [ ] YES  [ ] NO -->no new images    Telemetry reviewed independently - NSR, no acute events  EKG reviewed independently -->no new EKGs    Consultant(s) Notes Reviewed:  [ ] YES  [ ] NO  Care Discussed with Consultants/Other Providers [ ] YES  [ ] NO    Case discussed with resident  Care discussed with pt

## 2023-07-07 NOTE — PROGRESS NOTE ADULT - ASSESSMENT
89 yo F PMH of HTN, HLD, CAD, and advanced dementia presented for evaluation of in the setting of NSAID and steroid use.      UGIB presenting with melena and anemia,   Likely due to possible PUD   - Labs show a leukocytosis of 14k, Hb of 9.4 from a baseline of 12g/dl, lactate 2.9, BUN/Cr 56/0.7 (also on steroids). now improved   - pt has been taking meloxicam since her pelvic fracture about 2 weeks in early 6/2023  - has been on chronic prednisone 5mg for 3 years  - does not take a ppi at home and has been having epigastric pain the past 2 weeks  - denies use of pepto bismol and iron tablets  - no previous cscope or EGD; no FH of CRC  - CTAP: no active bleeding noted  - 2 large bore IVs  - PPI 40mg bid switched to PPI drip, changed back to BID dose  - resumed metoprolol but started with 5mg metoprolol IVP as pt refusing po, now on oral only   - last LA from 7/1 was normal aat 1.8  - repeat lactate 1.8  - hgb stable, diet advanced  - GI recs appreciated  - daughter is open to egd/colonoscopy if necesarry but since hgb now stable wants to hold off  - transfuse if Hb <8 due to CAD  - hold aspirin  - hold NSAIDs  - SCDs  - bleeding and fall precautions  - received 1 unit prbc on 6/30  - frepeat hgb stable. daughter is now think about discharghe to facility . disucssed regaridng discontinuing IV opiods. in agreement with plan.   daughter wants to have tramafol at bedtime     Afib with rvr, new vs paroxysmal  - metoprolol started, tachycardia doesn't appear to be related to bleed as LA and hgb stable  - will not initiate anticoagulation after team had discussion of risks and benefits with daughter who understood the risks of not initiating anticoagulation  - CHADSVASC 4 indicating a 4.8% risk of stroke vs HAS BLED of 3 points indicating a 5.8% risk of bleeding   - pt finally accepting PO, IV metoprolol stopped, PO succinate started    Decreased POintake  - likely due to delirium in setting of advanced dementia  - improved today  - daughter doesn't think shed want NGT or PEG  nutrition consulted recommended speech. speech consult pending      Chronic prednisone use for sob?  - pt has been on prednisone for 3 years  - was on 10mg until she was tapered to 5mg recently  - follows op with Dr. Sousa  - hasn't received prednisone in several days due to refusing PO  - does not exhibit adrenal insufficency  - will start taper, d/w patient pulmonologist . will do slow taper     AAA  - pt noted to have a 3.9 AAA that has increased by 0.4 since 1 year ago  - given its size and rate of growth, will need op surveillance with an US  - avoid HTN    Chronic L2 compression fracture  - abdominal binder  - TLSO brace on discharge    AD  HTN  HLD  - unclear history of whether it was ischemic cardiomyopathy; no stents; no CHF  - on aspirin, statin and metoprolol at home  - asa on hold due to GI bleed     Pelvic fracture following fall  - treat pain with lidocaine patch and tramadol  - avoid NSAIDs  - pt follows with Dr. Coyne, family requesting eval as pt grimacing.   repeat hip xray releived no acite changes     Glaucoma  - c/w timolol, dorzolamide, latanoprost    Advanced dementia  - not on any medications  - redirection  - daily orientation  - keep shades to window open  - haldol for agitation  - once pt is able to take po, can do trazodone  1:1 sitter can be discointnued in 24 to 48 hours if patient is no longer afitated     #proph  - vte: SCD  - gi: miralax and ppi  - fall and bleeding precautions  - CODE status: DNR/DNI as determined by her daughter who is her hcp as pt's advanced dementia precludes her from making an informed decision on her own    #Progress Note Handoff:  Pending (specify):  rate control. Pt now taking PO, hopeful rate control with metoprolol succinate still has sitter. daughter is ocnsidering facility now for patient after discharge now working up for GI bleed. follow GI recommendations. 1:1 sitter can be discontinued in 24 to 48 hours if patient is no longer agitated. IV morphine stopped today after discussion with daughter present ta bedside. taper prednisone   Family discussion: as above with daughter  Disposition: Home_x__/SNF___/Other________/Unknown at this time________      Patient is high risk of clinical deterioration and demise. transfer to stepdown/ICU in case of any clinical deterioration. daughter at bedside. she is aware of patient condition.    89 yo F PMH of HTN, HLD, CAD, and advanced dementia presented for evaluation of in the setting of NSAID and steroid use.      UGIB presenting with melena and anemia,   Likely due to possible PUD   - Labs show a leukocytosis of 14k, Hb of 9.4 from a baseline of 12g/dl, lactate 2.9, BUN/Cr 56/0.7 (also on steroids). now improved   - pt has been taking meloxicam since her pelvic fracture about 2 weeks in early 6/2023  - has been on chronic prednisone 5mg for 3 years  - does not take a ppi at home and has been having epigastric pain the past 2 weeks  - denies use of pepto bismol and iron tablets  - no previous cscope or EGD; no FH of CRC  - CTAP: no active bleeding noted  - 2 large bore IVs  - PPI 40mg bid switched to PPI drip, changed back to BID dose  - resumed metoprolol but started with 5mg metoprolol IVP as pt refusing po, now on oral only   - last LA from 7/1 was normal aat 1.8  - repeat lactate 1.8  - hgb stable, diet advanced  - GI recs appreciated  - daughter is open to egd/colonoscopy if necesarry but since hgb now stable wants to hold off  - transfuse if Hb <8 due to CAD  - hold aspirin  - hold NSAIDs  - SCDs  - bleeding and fall precautions  - received 1 unit prbc on 6/30  - frepeat hgb stable. daughter is now think about discharghe to facility . disucssed regaridng discontinuing IV opiods. in agreement with plan.   daughter wants to have tramafol at bedtime     Afib with rvr, new vs paroxysmal  - metoprolol started, tachycardia doesn't appear to be related to bleed as LA and hgb stable  - will not initiate anticoagulation after team had discussion of risks and benefits with daughter who understood the risks of not initiating anticoagulation  - CHADSVASC 4 indicating a 4.8% risk of stroke vs HAS BLED of 3 points indicating a 5.8% risk of bleeding   - pt finally accepting PO, IV metoprolol stopped, PO succinate started    Decreased POintake  - likely due to delirium in setting of advanced dementia  - improved today  - daughter doesn't think shed want NGT or PEG  nutrition consulted recommended speech. speech consult pending      Chronic prednisone use for sob?  - pt has been on prednisone for 3 years  - was on 10mg until she was tapered to 5mg recently  - follows op with Dr. Sousa  - hasn't received prednisone in several days due to refusing PO  - does not exhibit adrenal insufficency  - will start taper, d/w patient pulmonologist . will do slow taper     AAA  - pt noted to have a 3.9 AAA that has increased by 0.4 since 1 year ago  - given its size and rate of growth, will need op surveillance with an US  - avoid HTN    Chronic L2 compression fracture  - abdominal binder  - TLSO brace on discharge    AD  HTN  HLD  - unclear history of whether it was ischemic cardiomyopathy; no stents; no CHF  - on aspirin, statin and metoprolol at home  - asa on hold due to GI bleed     Pelvic fracture following fall  - treat pain with lidocaine patch and tramadol  - avoid NSAIDs  - pt follows with Dr. Coyne, family requesting eval as pt grimacing.   repeat hip xray releived no acite changes     Glaucoma  - c/w timolol, dorzolamide, latanoprost    Advanced dementia  - not on any medications  - redirection  - daily orientation  - keep shades to window open  - haldol for agitation  - once pt is able to take po, can do trazodone  1:1 sitter can be discointnued in 24 to 48 hours if patient is no longer afitated     #proph  - vte: SCD  - gi: miralax and ppi  - fall and bleeding precautions  - CODE status: DNR/DNI as determined by her daughter who is her hcp as pt's advanced dementia precludes her from making an informed decision on her own    #Progress Note Handoff:  Pending (specify):  rate control. Pt now taking PO, hopeful rate control with metoprolol succinate still has sitter. daughter is ocnsidering facility now for patient after discharge now working up for GI bleed. follow GI recommendations. 1:1 sitter can be discontinued in 24 to 48 hours if patient is no longer agitated. IV morphine stopped today after discussion with daughter present ta bedside. taper prednisone . follow UA   Family discussion: as above with daughter  Disposition: Home_x__/SNF___/Other________/Unknown at this time________      Patient is high risk of clinical deterioration and demise. transfer to stepdown/ICU in case of any clinical deterioration. daughter at bedside. she is aware of patient condition.

## 2023-07-08 LAB
ALBUMIN SERPL ELPH-MCNC: 3.2 G/DL — LOW (ref 3.5–5.2)
ALP SERPL-CCNC: 58 U/L — SIGNIFICANT CHANGE UP (ref 30–115)
ALT FLD-CCNC: 13 U/L — SIGNIFICANT CHANGE UP (ref 0–41)
ANION GAP SERPL CALC-SCNC: 12 MMOL/L — SIGNIFICANT CHANGE UP (ref 7–14)
AST SERPL-CCNC: 18 U/L — SIGNIFICANT CHANGE UP (ref 0–41)
BASOPHILS # BLD AUTO: 0.05 K/UL — SIGNIFICANT CHANGE UP (ref 0–0.2)
BASOPHILS NFR BLD AUTO: 0.7 % — SIGNIFICANT CHANGE UP (ref 0–1)
BILIRUB SERPL-MCNC: 0.9 MG/DL — SIGNIFICANT CHANGE UP (ref 0.2–1.2)
BLD GP AB SCN SERPL QL: SIGNIFICANT CHANGE UP
BUN SERPL-MCNC: 35 MG/DL — HIGH (ref 10–20)
CALCIUM SERPL-MCNC: 8.9 MG/DL — SIGNIFICANT CHANGE UP (ref 8.4–10.5)
CHLORIDE SERPL-SCNC: 106 MMOL/L — SIGNIFICANT CHANGE UP (ref 98–110)
CO2 SERPL-SCNC: 22 MMOL/L — SIGNIFICANT CHANGE UP (ref 17–32)
CREAT SERPL-MCNC: 1 MG/DL — SIGNIFICANT CHANGE UP (ref 0.7–1.5)
EGFR: 53 ML/MIN/1.73M2 — LOW
EOSINOPHIL # BLD AUTO: 0.17 K/UL — SIGNIFICANT CHANGE UP (ref 0–0.7)
EOSINOPHIL NFR BLD AUTO: 2.5 % — SIGNIFICANT CHANGE UP (ref 0–8)
GLUCOSE SERPL-MCNC: 137 MG/DL — HIGH (ref 70–99)
HCT VFR BLD CALC: 23.9 % — LOW (ref 37–47)
HCT VFR BLD CALC: 24.9 % — LOW (ref 37–47)
HGB BLD-MCNC: 7.4 G/DL — LOW (ref 12–16)
HGB BLD-MCNC: 7.7 G/DL — LOW (ref 12–16)
IMM GRANULOCYTES NFR BLD AUTO: 0.4 % — HIGH (ref 0.1–0.3)
LYMPHOCYTES # BLD AUTO: 0.66 K/UL — LOW (ref 1.2–3.4)
LYMPHOCYTES # BLD AUTO: 9.9 % — LOW (ref 20.5–51.1)
MAGNESIUM SERPL-MCNC: 2 MG/DL — SIGNIFICANT CHANGE UP (ref 1.8–2.4)
MCHC RBC-ENTMCNC: 29.8 PG — SIGNIFICANT CHANGE UP (ref 27–31)
MCHC RBC-ENTMCNC: 30.3 PG — SIGNIFICANT CHANGE UP (ref 27–31)
MCHC RBC-ENTMCNC: 30.9 G/DL — LOW (ref 32–37)
MCHC RBC-ENTMCNC: 31 G/DL — LOW (ref 32–37)
MCV RBC AUTO: 96.5 FL — SIGNIFICANT CHANGE UP (ref 81–99)
MCV RBC AUTO: 98 FL — SIGNIFICANT CHANGE UP (ref 81–99)
MONOCYTES # BLD AUTO: 0.51 K/UL — SIGNIFICANT CHANGE UP (ref 0.1–0.6)
MONOCYTES NFR BLD AUTO: 7.6 % — SIGNIFICANT CHANGE UP (ref 1.7–9.3)
NEUTROPHILS # BLD AUTO: 5.25 K/UL — SIGNIFICANT CHANGE UP (ref 1.4–6.5)
NEUTROPHILS NFR BLD AUTO: 78.9 % — HIGH (ref 42.2–75.2)
NRBC # BLD: 0 /100 WBCS — SIGNIFICANT CHANGE UP (ref 0–0)
NRBC # BLD: 0 /100 WBCS — SIGNIFICANT CHANGE UP (ref 0–0)
PHOSPHATE SERPL-MCNC: 3.1 MG/DL — SIGNIFICANT CHANGE UP (ref 2.1–4.9)
PLATELET # BLD AUTO: 128 K/UL — LOW (ref 130–400)
PLATELET # BLD AUTO: 206 K/UL — SIGNIFICANT CHANGE UP (ref 130–400)
PMV BLD: 10.7 FL — HIGH (ref 7.4–10.4)
PMV BLD: 11.2 FL — HIGH (ref 7.4–10.4)
POTASSIUM SERPL-MCNC: 3.9 MMOL/L — SIGNIFICANT CHANGE UP (ref 3.5–5)
POTASSIUM SERPL-SCNC: 3.9 MMOL/L — SIGNIFICANT CHANGE UP (ref 3.5–5)
PROT SERPL-MCNC: 4.9 G/DL — LOW (ref 6–8)
RBC # BLD: 2.44 M/UL — LOW (ref 4.2–5.4)
RBC # BLD: 2.58 M/UL — LOW (ref 4.2–5.4)
RBC # FLD: 14.9 % — HIGH (ref 11.5–14.5)
RBC # FLD: 15 % — HIGH (ref 11.5–14.5)
SODIUM SERPL-SCNC: 140 MMOL/L — SIGNIFICANT CHANGE UP (ref 135–146)
WBC # BLD: 6.67 K/UL — SIGNIFICANT CHANGE UP (ref 4.8–10.8)
WBC # BLD: 7.65 K/UL — SIGNIFICANT CHANGE UP (ref 4.8–10.8)
WBC # FLD AUTO: 6.67 K/UL — SIGNIFICANT CHANGE UP (ref 4.8–10.8)
WBC # FLD AUTO: 7.65 K/UL — SIGNIFICANT CHANGE UP (ref 4.8–10.8)

## 2023-07-08 PROCEDURE — 99232 SBSQ HOSP IP/OBS MODERATE 35: CPT

## 2023-07-08 RX ADMIN — POLYETHYLENE GLYCOL 3350 17 GRAM(S): 17 POWDER, FOR SOLUTION ORAL at 12:04

## 2023-07-08 RX ADMIN — DORZOLAMIDE HYDROCHLORIDE 1 DROP(S): 20 SOLUTION/ DROPS OPHTHALMIC at 05:20

## 2023-07-08 RX ADMIN — Medication 1 TABLET(S): at 12:02

## 2023-07-08 RX ADMIN — Medication 4 MILLIGRAM(S): at 05:20

## 2023-07-08 RX ADMIN — LIDOCAINE 1 PATCH: 4 CREAM TOPICAL at 17:33

## 2023-07-08 RX ADMIN — TRAMADOL HYDROCHLORIDE 50 MILLIGRAM(S): 50 TABLET ORAL at 19:47

## 2023-07-08 RX ADMIN — Medication 1 DROP(S): at 05:19

## 2023-07-08 RX ADMIN — LIDOCAINE 1 PATCH: 4 CREAM TOPICAL at 22:27

## 2023-07-08 RX ADMIN — TRAMADOL HYDROCHLORIDE 50 MILLIGRAM(S): 50 TABLET ORAL at 05:19

## 2023-07-08 RX ADMIN — Medication 25 MILLIGRAM(S): at 05:20

## 2023-07-08 RX ADMIN — DORZOLAMIDE HYDROCHLORIDE 1 DROP(S): 20 SOLUTION/ DROPS OPHTHALMIC at 17:29

## 2023-07-08 RX ADMIN — TRAMADOL HYDROCHLORIDE 50 MILLIGRAM(S): 50 TABLET ORAL at 07:10

## 2023-07-08 RX ADMIN — LIDOCAINE 1 PATCH: 4 CREAM TOPICAL at 08:30

## 2023-07-08 RX ADMIN — TRAMADOL HYDROCHLORIDE 50 MILLIGRAM(S): 50 TABLET ORAL at 20:30

## 2023-07-08 RX ADMIN — PANTOPRAZOLE SODIUM 40 MILLIGRAM(S): 20 TABLET, DELAYED RELEASE ORAL at 05:20

## 2023-07-08 RX ADMIN — PANTOPRAZOLE SODIUM 40 MILLIGRAM(S): 20 TABLET, DELAYED RELEASE ORAL at 17:30

## 2023-07-08 RX ADMIN — Medication 1 DROP(S): at 17:29

## 2023-07-08 NOTE — PROGRESS NOTE ADULT - SUBJECTIVE AND OBJECTIVE BOX
KIN HERNANDEZ  89y  Female    Patient is a 89y old  Female who presents with a chief complaint of Melena   (02 Jul 2023 15:27)      INTERVAL HPI/OVERNIGHT EVENTS:  Pt sleeping comfortably. No acute events overnight.     Vital Signs Last 24 Hrs  T(C): 36.1 (08 Jul 2023 14:10), Max: 36.1 (08 Jul 2023 14:10)  T(F): 96.9 (08 Jul 2023 14:10), Max: 96.9 (08 Jul 2023 14:10)  HR: 111 (08 Jul 2023 14:10) (66 - 111)  BP: 119/56 (08 Jul 2023 14:10) (90/50 - 119/56)  BP(mean): 77 (08 Jul 2023 05:25) (63 - 77)  RR: 18 (08 Jul 2023 14:10) (18 - 18)  SpO2: --        07-07-23 @ 07:01  -  07-08-23 @ 07:00  --------------------------------------------------------  IN: 1314 mL / OUT: 0 mL / NET: 1314 mL    07-08-23 @ 07:01  -  07-08-23 @ 14:17  --------------------------------------------------------  IN: 260 mL / OUT: 0 mL / NET: 260 mL      PHYSICAL EXAM:  GENERAL:  NAD  SKIN: No rashes or lesions  HEENT: Atraumatic. Normocephalic. Anicteric  NECK:  No JVD.   PULMONARY: Clear to ausculation bilaterally. No wheezing. No rales  CVS: Normal S1, S2. Regular rate and rhythm. No murmurs.  ABDOMEN/GI: Soft, Nontender, Nondistended; Bowel sounds are present  EXTREMITIES:  No edema B/L LE.  Neuro exam could not be assessed.       LABS                          7.4    6.67  )-----------( 128      ( 08 Jul 2023 05:49 )             23.9     07-08    140  |  106  |  35<H>  ----------------------------<  137<H>  3.9   |  22  |  1.0    Ca    8.9      08 Jul 2023 05:49  Phos  3.1     07-08  Mg     2.0     07-08    TPro  4.9<L>  /  Alb  3.2<L>  /  TBili  0.9  /  DBili  x   /  AST  18  /  ALT  13  /  AlkPhos  58  07-08      Urinalysis Basic - ( 08 Jul 2023 05:49 )  Color: x / Appearance: x / SG: x / pH: x  Gluc: 137 mg/dL / Ketone: x  / Bili: x / Urobili: x   Blood: x / Protein: x / Nitrite: x   Leuk Esterase: x / RBC: x / WBC x   Sq Epi: x / Non Sq Epi: x / Bacteria: x      CAPILLARY BLOOD GLUCOSE  POCT Blood Glucose.: 205 mg/dL (06 Jul 2023 18:09)        RADIOLOGY & ADDITIONAL TESTS:    Imaging Personally Reviewed:  [ ] YES  [ ] NO    HEALTH ISSUES - PROBLEM Dx:  Pelvic pain

## 2023-07-08 NOTE — PROGRESS NOTE ADULT - ASSESSMENT
89 yo F PMH of HTN, HLD, CAD, and advanced dementia presented for evaluation of in the setting of NSAID and steroid use.    #UGIB presenting with melena and anemia, likely due to possible PUD   - Labs show a leukocytosis of 14k, Hb of 9.4 from a baseline of 12g/dl, lactate 2.9, BUN/Cr 56/0.7 (also on steroids).   - pt has been taking meloxicam since her pelvic fracture about 2 weeks in early 6/2023  - has been on chronic prednisone 5mg for 3 years  - does not take a ppi at home and has been having epigastric pain the past 2 weeks  - denies use of pepto bismol and iron tablets  - no previous cscope or EGD; no FH of CRC  - CTAP: no active bleeding noted  - 2 large bore IVs  - PPI 40mg bid switched to PPI drip, changed back to BID dose  - GI recs appreciated: daughter is open to egd/colonoscopy if necessary but since hgb now stable wants to hold off.   - Received 1 unit prbc on 6/30. Transfuse if Hb <8 due to CAD. Today Hb is 7.4, will repeat CBC in PM. if Hb <8, transfuse 1U PRBC. If H/H continues to downtrend tomorrow as well, recall GI to discuss EGD/colonoscopy.   - Hold aspirin and NSAIDs  - Diet as tolerated; If continues to drop H/H, then switch to clears.     #Afib with RVR, new vs paroxysmal  - Now rate controlled.   - Tachycardia doesn't appear to be related to bleed as LA and hgb stable.   - Will not initiate anticoagulation after team had discussion of risks and benefits with daughter who understood the risks of not initiating anticoagulation  - CHADSVASC 4 indicating a 4.8% risk of stroke vs HAS BLED of 3 points indicating a 5.8% risk of bleeding   - pt finally accepting PO, IV metoprolol stopped, PO succinate started: c/w Metoprolol succinate    #Decreased PO intake  - likely due to delirium in setting of advanced dementia  - improved today: per daughter, she ate all of breakfast  - daughter doesn't think shed want NGT or PEG  - nutrition consulted: Recommended SLP maddy. SLP maddy noted: Puree diet w/ thin liquids.    #Chronic prednisone use for sob?  - pt has been on prednisone for 3 years  - was on 10mg until she was tapered to 5mg recently  - follows op with Dr. Sousa  - hasn't received prednisone in several days due to refusing PO  - does not exhibit adrenal insufficiency  - will start taper, d/w patient pulmonologist: will do slow taper     #AAA  - pt noted to have a 3.9 AAA that has increased by 0.4 since 1 year ago  - given its size and rate of growth, will need op surveillance with an US  - avoid HTN    #Chronic L2 compression fracture  - abdominal binder  - TLSO brace on discharge    #AD  #HTN  #HLD  - unclear history of whether it was ischemic cardiomyopathy; no stents; no CHF  - on aspirin, statin and metoprolol at home  - asa on hold due to GI bleed     #Pelvic fracture following fall  - treat pain with lidocaine patch and tramadol  - avoid NSAIDs  - pt follows with Dr. Coyne, family requesting eval as pt grimacing. IV opioids discontinued after discussion with pt's daughter. C/w Tramadol at bedtime.   - Repeat hip xray revealed no acute changes.     #Glaucoma  - c/w timolol, dorzolamide, latanoprost    #Advanced dementia  - not on any medications  - redirection  - daily orientation  - keep shades to window open  - haldol for agitation  - once pt is able to take po, can do trazodone; 1:1 sitter discontinued.     #proph  - vte: SCD  - gi: miralax and ppi  - fall and bleeding precautions  - CODE status: DNR/DNI as determined by her daughter who is her hcp as pt's advanced dementia precludes her from making an informed decision on her own    #Progress Note Handoff:  Pending (specify): Monitor H/H. May need 1U PRBC today if rpt Hb <8. GI f/u if H/H continues to downtrend. Taper prednisone   Family discussion: as above with daughter  Disposition: Home___/SNF_x__/Other________/Unknown at this time________ NELI sent to [facility], f/u with CM/SW.     Patient is high risk of clinical deterioration and demise. Transfer to stepdown/ICU in case of any clinical deterioration. Daughter at bedside. she is aware of patient condition.

## 2023-07-09 LAB
HCT VFR BLD CALC: 28.6 % — LOW (ref 37–47)
HGB BLD-MCNC: 9.2 G/DL — LOW (ref 12–16)
MCHC RBC-ENTMCNC: 29.7 PG — SIGNIFICANT CHANGE UP (ref 27–31)
MCHC RBC-ENTMCNC: 32.2 G/DL — SIGNIFICANT CHANGE UP (ref 32–37)
MCV RBC AUTO: 92.3 FL — SIGNIFICANT CHANGE UP (ref 81–99)
NRBC # BLD: 0 /100 WBCS — SIGNIFICANT CHANGE UP (ref 0–0)
PLATELET # BLD AUTO: 205 K/UL — SIGNIFICANT CHANGE UP (ref 130–400)
PMV BLD: 10.2 FL — SIGNIFICANT CHANGE UP (ref 7.4–10.4)
RBC # BLD: 3.1 M/UL — LOW (ref 4.2–5.4)
RBC # FLD: 16.2 % — HIGH (ref 11.5–14.5)
WBC # BLD: 7.37 K/UL — SIGNIFICANT CHANGE UP (ref 4.8–10.8)
WBC # FLD AUTO: 7.37 K/UL — SIGNIFICANT CHANGE UP (ref 4.8–10.8)

## 2023-07-09 PROCEDURE — 99233 SBSQ HOSP IP/OBS HIGH 50: CPT

## 2023-07-09 RX ADMIN — Medication 25 MILLIGRAM(S): at 07:54

## 2023-07-09 RX ADMIN — Medication 4 MILLIGRAM(S): at 12:55

## 2023-07-09 RX ADMIN — POLYETHYLENE GLYCOL 3350 17 GRAM(S): 17 POWDER, FOR SOLUTION ORAL at 12:56

## 2023-07-09 RX ADMIN — Medication 1 DROP(S): at 16:54

## 2023-07-09 RX ADMIN — LIDOCAINE 1 PATCH: 4 CREAM TOPICAL at 16:54

## 2023-07-09 RX ADMIN — Medication 1 TABLET(S): at 12:56

## 2023-07-09 RX ADMIN — PANTOPRAZOLE SODIUM 40 MILLIGRAM(S): 20 TABLET, DELAYED RELEASE ORAL at 16:54

## 2023-07-09 RX ADMIN — TRAMADOL HYDROCHLORIDE 50 MILLIGRAM(S): 50 TABLET ORAL at 20:03

## 2023-07-09 RX ADMIN — LIDOCAINE 1 PATCH: 4 CREAM TOPICAL at 07:37

## 2023-07-09 RX ADMIN — LIDOCAINE 1 PATCH: 4 CREAM TOPICAL at 18:37

## 2023-07-09 RX ADMIN — DORZOLAMIDE HYDROCHLORIDE 1 DROP(S): 20 SOLUTION/ DROPS OPHTHALMIC at 16:54

## 2023-07-09 NOTE — PROGRESS NOTE ADULT - ASSESSMENT
HPI:  89yo F with a PMH of HTN, HLD, CAD, Dementia(A&O1 at baseline) present for weakness and abdominal pain that started this morning. History per the ED note as patient and unable to reach daughter (Called twice with no answer). Pt noted to have dark black stools for the past few days. Pt on AC per ED. No fevers, chill, headache, recent illness/travel, cough, chest pain, or SOB.      #Melena secondary to acute blood loss anemia secondary to gi hemorrhage with history of meloxicam use and prednisone use   hold nsaids   ppi   serial cbc   if subsequent hemoglobin < 8 , transfuse 1 prbc   family is hesitant regarding endoscopic investigation , but if persistent blood loss , will need to determine whether to pursue intervention versus palliative measures    #HTN   BP: 113/59 (09 Jul 2023 09:15) (97/55 - 119/56)  controlled    #Dyslipidemia     #CAD    #Dementia at baseline     #Chronic prednisone use - 3 mg prednisone tomorrow ,    #Overweight BMI 26 patient needs to see dieitian outpatient for further evaluation     PROGRESS NOTE HANDOFF    Pending: serial cbc , gi following     Family discussion: spoke with daughter at length at bedside    Disposition: Home

## 2023-07-09 NOTE — PROGRESS NOTE ADULT - ASSESSMENT
89 yo female with a pmh of htn, hld, cad, dementia present for weakness and abdominal pain that started this morning. Found to aslo have black stool.     #Upper GI Bleed   #Melena   #Weakness and Abdominal pain  - on chronic prednisone 5mg for 3 years  - no previous colonoscopy, no FHx of CRC  - Transfuse to keep >8 (has CAD), Active type and screen (last on 7/8)  - 1 unit of pRBC transfused on 6/30  - PPI 40mg bid switched to PPI drip, changed back to BID dose  - GI recs appreciated: daughter is open to egd/colonoscopy if necessary but since hgb now stable wants to hold off.   - hold aspirin and NSAIDs  - Hgb 7/8: 7.4, 7.7    #Afib with RVR, new vs paroxysmal  - Now rate controlled.   - Tachycardia doesn't appear to be related to bleed as LA and hgb stable.   - Will not initiate anticoagulation after team had discussion of risks and benefits with daughter who understood the risks of not initiating anticoagulation  - CHADSVASC 4 indicating a 4.8% risk of stroke vs HAS BLED of 3 points indicating a 5.8% risk of bleeding   - pt finally accepting PO, IV metoprolol stopped, PO succinate started: c/w Metoprolol succinate    #Decreased PO intake  - likely due to delirium in setting of advanced dementia  - improved today: per daughter, she ate all of breakfast  - daughter doesn't think shed want NGT or PEG  - nutrition consulted: Recommended SLP maddy. SLP eval noted: Puree diet w/ thin liquids.    #chronic prednisone use for sob?  - on prednisone for 3 years  - was on 10mg, tapered to 5mg recently   - will start taper, d/w patient pulmonologist, will do slow taper     #AAA  - 3.9 AAA that increased by 0.4 since 1 year ago  - US surveillance as o/p  - avoid HTN    #pelvic fracture following fall  - treat pain with lidocaine patch and tramadol  - bowel regimen  - bladder scan q 8hrs x 3  - if >400 cc, please place [place]  - bowel regimen    #glaucoma  - c/w timolol, dorzolamide, latanoprost    #chronic L2 compression fracture  - abdominal binder  - TLSO brace on discharge    #CAD  #HTN   #HLD   - unclear history of whether it was ischemic cardiomyopathy; no stents; no CHF  - on aspirin, statin and metoprolol at home  - asa on hold due to GI bleed     #Dementia   - daily orientation    Misc:   DVT: SCDs  GI: PPI IV BID   Diet: puree diet w/thin liquids    89 yo female with a pmh of htn, hld, cad, dementia present for weakness and abdominal pain that started this morning. Found to aslo have black stool.     #Upper GI Bleed   #Melena   #Weakness and Abdominal pain  - on chronic prednisone 5mg for 3 years  - no previous colonoscopy, no FHx of CRC  - Transfuse to keep >8 (has CAD), Active type and screen (last on 7/8)  - 1 unit of pRBC transfused on 6/30  - PPI 40mg bid switched to PPI drip, changed back to BID dose  - GI recs appreciated: daughter is open to egd/colonoscopy if necessary but since hgb now stable wants to hold off.   - hold aspirin and NSAIDs  - Hgb 7/8: 7.4, 7.7  - 1 unit pending to be transfused on 7/9, however pt refusing at this time    #Afib with RVR, new vs paroxysmal  - Now rate controlled.   - Tachycardia doesn't appear to be related to bleed as LA and hgb stable.   - Will not initiate anticoagulation after team had discussion of risks and benefits with daughter who understood the risks of not initiating anticoagulation  - CHADSVASC 4 indicating a 4.8% risk of stroke vs HAS BLED of 3 points indicating a 5.8% risk of bleeding   - pt finally accepting PO, IV metoprolol stopped, PO succinate started: c/w Metoprolol succinate    #Decreased PO intake  - likely due to delirium in setting of advanced dementia  - improved today: per daughter, she ate all of breakfast  - daughter doesn't think shed want NGT or PEG  - nutrition consulted: Recommended SLP maddy. SLP maddy noted: Puree diet w/ thin liquids.    #chronic prednisone use for sob?  - on prednisone for 3 years  - was on 10mg, tapered to 5mg recently   - will start taper, d/w patient pulmonologist, will do slow taper     #AAA  - 3.9 AAA that increased by 0.4 since 1 year ago  - US surveillance as o/p  - avoid HTN    #pelvic fracture following fall  - treat pain with lidocaine patch and tramadol  - bowel regimen  - bladder scan q 8hrs x 3  - if >400 cc, please place [place]  - bowel regimen    #glaucoma  - c/w timolol, dorzolamide, latanoprost    #chronic L2 compression fracture  - abdominal binder  - TLSO brace on discharge    #CAD  #HTN   #HLD   - unclear history of whether it was ischemic cardiomyopathy; no stents; no CHF  - on aspirin, statin and metoprolol at home  - asa on hold due to GI bleed     #Dementia   - daily orientation    Misc:   DVT: SCDs  GI: PPI IV BID   Diet: puree diet w/thin liquids    89 yo female with a pmh of htn, hld, cad, dementia present for weakness and abdominal pain that started this morning. Found to aslo have black stool.     #Upper GI Bleed   #Melena   #Weakness and Abdominal pain  - on chronic prednisone 5mg for 3 years  - no previous colonoscopy, no FHx of CRC  - Transfuse to keep >8 (has CAD), Active type and screen (last on 7/8)  - 1 unit of pRBC transfused on 6/30  - PPI 40mg bid switched to PPI drip, changed back to BID dose  - GI recs appreciated: daughter is open to egd/colonoscopy if necessary but since hgb now stable wants to hold off.   - hold aspirin and NSAIDs  - Hgb 7/8: 7.4, 7.7  - 1 unit pending to be transfused on 7/9    #Afib with RVR, new vs paroxysmal  - Now rate controlled.   - Tachycardia doesn't appear to be related to bleed as LA and hgb stable.   - Will not initiate anticoagulation after team had discussion of risks and benefits with daughter who understood the risks of not initiating anticoagulation  - CHADSVASC 4 indicating a 4.8% risk of stroke vs HAS BLED of 3 points indicating a 5.8% risk of bleeding   - pt finally accepting PO, IV metoprolol stopped, PO succinate started: c/w Metoprolol succinate  - pt refusing tele for past few days    #Decreased PO intake  - likely due to delirium in setting of advanced dementia  - improved today: per daughter, she ate all of breakfast  - daughter doesn't think shed want NGT or PEG  - nutrition consulted: Recommended SLP maddy. SLP maddy noted: Puree diet w/ thin liquids.    #chronic prednisone use for sob?  - on prednisone for 3 years  - was on 10mg, tapered to 5mg recently   - will start taper, d/w patient pulmonologist, will do slow taper     #AAA  - 3.9 AAA that increased by 0.4 since 1 year ago  - US surveillance as o/p  - avoid HTN    #pelvic fracture following fall  - treat pain with lidocaine patch and tramadol  - bowel regimen  - bladder scan q 8hrs x 3  - if >400 cc, please place [place]  - bowel regimen    #glaucoma  - c/w timolol, dorzolamide, latanoprost    #chronic L2 compression fracture  - abdominal binder  - TLSO brace on discharge    #CAD  #HTN   #HLD   - unclear history of whether it was ischemic cardiomyopathy; no stents; no CHF  - on aspirin, statin and metoprolol at home  - asa on hold due to GI bleed     #Dementia   - daily orientation    Misc:   DVT: SCDs  GI: PPI IV BID   Diet: puree diet w/thin liquids

## 2023-07-09 NOTE — PROGRESS NOTE ADULT - SUBJECTIVE AND OBJECTIVE BOX
KIN HERNANDEZ  89[facility]      Patient is a 89y old  Female who presents with a chief complaint of Melena (09 Jul 2023 04:44)      My note supersedes the resident's note      INTERVAL HPI/OVERNIGHT EVENTS:    no acute events overnight     REVIEW OF SYSTEMS:  CONSTITUTIONAL:fatigue  EYES: No eye pain, visual disturbances, or discharge  ENMT:  No difficulty hearing, tinnitus, vertigo; No sinus or throat pain  NECK: No pain or stiffness  BREASTS: No pain, masses, or nipple discharge  RESPIRATORY: No cough, wheezing, chills or hemoptysis; No shortness of breath  CARDIOVASCULAR: No chest pain, palpitations, dizziness, or leg swelling  GASTROINTESTINAL: No abdominal or epigastric pain. No nausea, vomiting, or hematemesis; No diarrhea or constipation. No melena or hematochezia.  GENITOURINARY: No dysuria, frequency, hematuria, or incontinence  NEUROLOGICAL: No headaches, memory loss, loss of strength, numbness, or tremors  SKIN: No itching, burning, rashes, or lesions   LYMPH NODES: No enlarged glands  ENDOCRINE: No heat or cold intolerance; No hair loss  MUSCULOSKELETAL: No joint pain or swelling; No muscle, back, or extremity pain  PSYCHIATRIC: No depression, anxiety, mood swings, or difficulty sleeping  HEME/LYMPH: No easy bruising, or bleeding gums  ALLERY AND IMMUNOLOGIC: No hives or eczema  FAMILY HISTORY:    T(C): 36.1 (07-09-23 @ 09:15), Max: 36.1 (07-08-23 @ 14:10)  HR: 76 (07-09-23 @ 09:15) (74 - 111)  BP: 113/59 (07-09-23 @ 09:15) (97/55 - 119/56)  RR: 18 (07-09-23 @ 09:15) (18 - 18)  SpO2: --  Wt(kg): --Vital Signs Last 24 Hrs  T(C): 36.1 (09 Jul 2023 09:15), Max: 36.1 (08 Jul 2023 14:10)  T(F): 96.9 (09 Jul 2023 09:15), Max: 96.9 (08 Jul 2023 14:10)  HR: 76 (09 Jul 2023 09:15) (74 - 111)  BP: 113/59 (09 Jul 2023 09:15) (97/55 - 119/56)  BP(mean): --  RR: 18 (09 Jul 2023 09:15) (18 - 18)  SpO2: --        PHYSICAL EXAM:  GENERAL: NAD,   HEAD:  Atraumatic, Normocephalic  EYES: EOMI, PERRLA, conjunctiva and sclera clear  ENMT: No tonsillar erythema, exudates, or enlargement;   NECK: Supple, No JVD, Normal thyroid  NERVOUS SYSTEM:  Alert & Oriented X1  PULM: Clear to auscultation bilaterally  CARDIAC: Regular rate and rhythm; No murmurs, rubs, or gallops  GI: Soft, Nontender, Nondistended; Bowel sounds present  EXTREMITIES:  2+ Peripheral Pulses, No clubbing, cyanosis, or edema  LYMPH: No lymphadenopathy noted  SKIN: No rashes or lesions    Consultant(s) Notes Reviewed:  [x ] YES  [ ] NO  Care Discussed with Consultants/Other Providers [ x] YES  [ ] NO    LABS:                            7.7    7.65  )-----------( 206      ( 08 Jul 2023 16:07 )             24.9   07-08    140  |  106  |  35<H>  ----------------------------<  137<H>  3.9   |  22  |  1.0    Ca    8.9      08 Jul 2023 05:49  Phos  3.1     07-08  Mg     2.0     07-08    TPro  4.9<L>  /  Alb  3.2<L>  /  TBili  0.9  /  DBili  x   /  AST  18  /  ALT  13  /  AlkPhos  58  07-08             acetaminophen     Tablet .. 1000 milliGRAM(s) Oral every 8 hours  dorzolamide 2% Ophthalmic Solution 1 Drop(s) Both EYES <User Schedule>  latanoprost 0.005% Ophthalmic Solution 1 Drop(s) Both EYES at bedtime  lidocaine   4% Patch 1 Patch Transdermal every 24 hours  metoprolol succinate ER 25 milliGRAM(s) Oral daily  multivitamin 1 Tablet(s) Oral daily  pantoprazole    Tablet 40 milliGRAM(s) Oral two times a day  polyethylene glycol 3350 17 Gram(s) Oral daily  predniSONE   Tablet 4 milliGRAM(s) Oral daily  senna 2 Tablet(s) Oral at bedtime  timolol 0.5% Solution 1 Drop(s) Both EYES every 12 hours  traMADol 50 milliGRAM(s) Oral every 6 hours PRN  traMADol 25 milliGRAM(s) Oral at bedtime      HEALTH ISSUES - PROBLEM Dx:  Pelvic pain            Case Discussed with House Staff   55 minutes spent on total encounter; more than 50% of the visit was spent counseling and/or coordinating care by the attending physician.    Spectra [phone]

## 2023-07-09 NOTE — PROGRESS NOTE ADULT - SUBJECTIVE AND OBJECTIVE BOX
24H events:    Patient is a 89y old Female who presents with a chief complaint of Melena     Today is hospital day 9d. This morning patient was seen and examined at bedside, resting comfortably in bed.      PAST MEDICAL & SURGICAL HISTORY  CAD (coronary artery disease)    HTN (hypertension)      SOCIAL HISTORY:  Social History:      ALLERGIES:  No Known Allergies    MEDICATIONS:  STANDING MEDICATIONS  acetaminophen     Tablet .. 1000 milliGRAM(s) Oral every 8 hours  dorzolamide 2% Ophthalmic Solution 1 Drop(s) Both EYES <User Schedule>  latanoprost 0.005% Ophthalmic Solution 1 Drop(s) Both EYES at bedtime  lidocaine   4% Patch 1 Patch Transdermal every 24 hours  metoprolol succinate ER 25 milliGRAM(s) Oral daily  multivitamin 1 Tablet(s) Oral daily  pantoprazole    Tablet 40 milliGRAM(s) Oral two times a day  polyethylene glycol 3350 17 Gram(s) Oral daily  predniSONE   Tablet 4 milliGRAM(s) Oral daily  senna 2 Tablet(s) Oral at bedtime  timolol 0.5% Solution 1 Drop(s) Both EYES every 12 hours  traMADol 25 milliGRAM(s) Oral at bedtime    PRN MEDICATIONS  traMADol 50 milliGRAM(s) Oral every 6 hours PRN    VITALS:   T(F): 96.9  HR: 74  BP: 103/55  RR: 18  SpO2: --    PHYSICAL EXAM:      LABS:                        7.7    7.65  )-----------( 206      ( 08 Jul 2023 16:07 )             24.9     07-08    140  |  106  |  35<H>  ----------------------------<  137<H>  3.9   |  22  |  1.0    Ca    8.9      08 Jul 2023 05:49  Phos  3.1     07-08  Mg     2.0     07-08    TPro  4.9<L>  /  Alb  3.2<L>  /  TBili  0.9  /  DBili  x   /  AST  18  /  ALT  13  /  AlkPhos  58  07-08      Urinalysis Basic - ( 08 Jul 2023 05:49 )    Color: x / Appearance: x / SG: x / pH: x  Gluc: 137 mg/dL / Ketone: x  / Bili: x / Urobili: x   Blood: x / Protein: x / Nitrite: x   Leuk Esterase: x / RBC: x / WBC x   Sq Epi: x / Non Sq Epi: x / Bacteria: x 24H events:    Patient is a 89y old Female who presents with a chief complaint of Melena     Today is hospital day 9d. This morning patient was seen and examined at bedside, resting comfortably in the chair. Patient did not communicate, with Polish  as well.     PAST MEDICAL & SURGICAL HISTORY  CAD (coronary artery disease)    HTN (hypertension)      SOCIAL HISTORY:  Social History:      ALLERGIES:  No Known Allergies    MEDICATIONS:  STANDING MEDICATIONS  acetaminophen     Tablet .. 1000 milliGRAM(s) Oral every 8 hours  dorzolamide 2% Ophthalmic Solution 1 Drop(s) Both EYES <User Schedule>  latanoprost 0.005% Ophthalmic Solution 1 Drop(s) Both EYES at bedtime  lidocaine   4% Patch 1 Patch Transdermal every 24 hours  metoprolol succinate ER 25 milliGRAM(s) Oral daily  multivitamin 1 Tablet(s) Oral daily  pantoprazole    Tablet 40 milliGRAM(s) Oral two times a day  polyethylene glycol 3350 17 Gram(s) Oral daily  predniSONE   Tablet 4 milliGRAM(s) Oral daily  senna 2 Tablet(s) Oral at bedtime  timolol 0.5% Solution 1 Drop(s) Both EYES every 12 hours  traMADol 25 milliGRAM(s) Oral at bedtime    PRN MEDICATIONS  traMADol 50 milliGRAM(s) Oral every 6 hours PRN    VITALS:   T(F): 96.9  HR: 74  BP: 103/55  RR: 18  SpO2: --    PHYSICAL EXAM:  PHYSICAL EXAM:  GENERAL:  NAD  SKIN: No rashes or lesions  PULMONARY: Clear to ausculation bilaterally. No wheezing. No rales  CVS: Normal S1, S2. Regular rate and rhythm. No murmurs.  ABDOMEN/GI: Soft, Nontender, Nondistended  EXTREMITIES:  No edema B/L LE.    LABS:                        7.7    7.65  )-----------( 206      ( 08 Jul 2023 16:07 )             24.9     07-08    140  |  106  |  35<H>  ----------------------------<  137<H>  3.9   |  22  |  1.0    Ca    8.9      08 Jul 2023 05:49  Phos  3.1     07-08  Mg     2.0     07-08    TPro  4.9<L>  /  Alb  3.2<L>  /  TBili  0.9  /  DBili  x   /  AST  18  /  ALT  13  /  AlkPhos  58  07-08      Urinalysis Basic - ( 08 Jul 2023 05:49 )    Color: x / Appearance: x / SG: x / pH: x  Gluc: 137 mg/dL / Ketone: x  / Bili: x / Urobili: x   Blood: x / Protein: x / Nitrite: x   Leuk Esterase: x / RBC: x / WBC x   Sq Epi: x / Non Sq Epi: x / Bacteria: x

## 2023-07-10 ENCOUNTER — TRANSCRIPTION ENCOUNTER (OUTPATIENT)
Age: 88
End: 2023-07-10

## 2023-07-10 LAB
ANION GAP SERPL CALC-SCNC: 10 MMOL/L — SIGNIFICANT CHANGE UP (ref 7–14)
BUN SERPL-MCNC: 21 MG/DL — HIGH (ref 10–20)
CALCIUM SERPL-MCNC: 9.3 MG/DL — SIGNIFICANT CHANGE UP (ref 8.4–10.5)
CHLORIDE SERPL-SCNC: 104 MMOL/L — SIGNIFICANT CHANGE UP (ref 98–110)
CO2 SERPL-SCNC: 27 MMOL/L — SIGNIFICANT CHANGE UP (ref 17–32)
CREAT SERPL-MCNC: 0.9 MG/DL — SIGNIFICANT CHANGE UP (ref 0.7–1.5)
EGFR: 60 ML/MIN/1.73M2 — SIGNIFICANT CHANGE UP
GLUCOSE SERPL-MCNC: 105 MG/DL — HIGH (ref 70–99)
HCT VFR BLD CALC: 31.3 % — LOW (ref 37–47)
HGB BLD-MCNC: 9.9 G/DL — LOW (ref 12–16)
MAGNESIUM SERPL-MCNC: 2 MG/DL — SIGNIFICANT CHANGE UP (ref 1.8–2.4)
MCHC RBC-ENTMCNC: 28.9 PG — SIGNIFICANT CHANGE UP (ref 27–31)
MCHC RBC-ENTMCNC: 31.6 G/DL — LOW (ref 32–37)
MCV RBC AUTO: 91.5 FL — SIGNIFICANT CHANGE UP (ref 81–99)
NRBC # BLD: 0 /100 WBCS — SIGNIFICANT CHANGE UP (ref 0–0)
PLATELET # BLD AUTO: 222 K/UL — SIGNIFICANT CHANGE UP (ref 130–400)
PMV BLD: 9.9 FL — SIGNIFICANT CHANGE UP (ref 7.4–10.4)
POTASSIUM SERPL-MCNC: 4.4 MMOL/L — SIGNIFICANT CHANGE UP (ref 3.5–5)
POTASSIUM SERPL-SCNC: 4.4 MMOL/L — SIGNIFICANT CHANGE UP (ref 3.5–5)
RBC # BLD: 3.42 M/UL — LOW (ref 4.2–5.4)
RBC # FLD: 16.3 % — HIGH (ref 11.5–14.5)
SODIUM SERPL-SCNC: 141 MMOL/L — SIGNIFICANT CHANGE UP (ref 135–146)
WBC # BLD: 7.06 K/UL — SIGNIFICANT CHANGE UP (ref 4.8–10.8)
WBC # FLD AUTO: 7.06 K/UL — SIGNIFICANT CHANGE UP (ref 4.8–10.8)

## 2023-07-10 PROCEDURE — 99232 SBSQ HOSP IP/OBS MODERATE 35: CPT

## 2023-07-10 RX ORDER — PANTOPRAZOLE SODIUM 20 MG/1
40 TABLET, DELAYED RELEASE ORAL EVERY 12 HOURS
Refills: 0 | Status: DISCONTINUED | OUTPATIENT
Start: 2023-07-11 | End: 2023-07-11

## 2023-07-10 RX ADMIN — Medication 1 DROP(S): at 17:32

## 2023-07-10 RX ADMIN — LATANOPROST 1 DROP(S): 0.05 SOLUTION/ DROPS OPHTHALMIC; TOPICAL at 21:50

## 2023-07-10 RX ADMIN — Medication 3 MILLIGRAM(S): at 06:17

## 2023-07-10 RX ADMIN — Medication 25 MILLIGRAM(S): at 06:17

## 2023-07-10 RX ADMIN — TRAMADOL HYDROCHLORIDE 25 MILLIGRAM(S): 50 TABLET ORAL at 21:47

## 2023-07-10 RX ADMIN — PANTOPRAZOLE SODIUM 40 MILLIGRAM(S): 20 TABLET, DELAYED RELEASE ORAL at 17:32

## 2023-07-10 RX ADMIN — DORZOLAMIDE HYDROCHLORIDE 1 DROP(S): 20 SOLUTION/ DROPS OPHTHALMIC at 17:32

## 2023-07-10 RX ADMIN — Medication 1 TABLET(S): at 16:10

## 2023-07-10 RX ADMIN — LIDOCAINE 1 PATCH: 4 CREAM TOPICAL at 19:40

## 2023-07-10 RX ADMIN — Medication 1000 MILLIGRAM(S): at 06:18

## 2023-07-10 RX ADMIN — Medication 0.5 MILLIGRAM(S): at 01:33

## 2023-07-10 RX ADMIN — TRAMADOL HYDROCHLORIDE 25 MILLIGRAM(S): 50 TABLET ORAL at 22:10

## 2023-07-10 RX ADMIN — PANTOPRAZOLE SODIUM 40 MILLIGRAM(S): 20 TABLET, DELAYED RELEASE ORAL at 06:17

## 2023-07-10 RX ADMIN — DORZOLAMIDE HYDROCHLORIDE 1 DROP(S): 20 SOLUTION/ DROPS OPHTHALMIC at 06:17

## 2023-07-10 RX ADMIN — Medication 1 DROP(S): at 06:17

## 2023-07-10 NOTE — CHART NOTE - NSCHARTNOTESELECT_GEN_ALL_CORE
Event Note
Nutrition - RD Follow Up/Event Note

## 2023-07-10 NOTE — PROGRESS NOTE ADULT - SUBJECTIVE AND OBJECTIVE BOX
Progress note    INTERVAL HPI/OVERNIGHT EVENTS:    Patient seen and examined at bedside. Patient in no acute distress.      REVIEW OF SYSTEMS:  All other 13 Review of systems were reviewed and are negative    FAMILY HISTORY:    T(C): 36.4 (07-10-23 @ 13:40), Max: 36.4 (07-10-23 @ 13:40)  HR: 103 (07-10-23 @ 13:40) (74 - 103)  BP: 110/75 (07-10-23 @ 13:40) (110/75 - 140/66)  RR: 18 (07-10-23 @ 13:40) (18 - 18)  SpO2: 99% (07-10-23 @ 06:54) (99% - 99%)  Wt(kg): --Vital Signs Last 24 Hrs  T(C): 36.4 (10 Jul 2023 13:40), Max: 36.4 (10 Jul 2023 13:40)  T(F): 97.5 (10 Jul 2023 13:40), Max: 97.5 (10 Jul 2023 13:40)  HR: 103 (10 Jul 2023 13:40) (74 - 103)  BP: 110/75 (10 Jul 2023 13:40) (110/75 - 140/66)  BP(mean): 94 (10 Jul 2023 06:54) (94 - 94)  RR: 18 (10 Jul 2023 13:40) (18 - 18)  SpO2: 99% (10 Jul 2023 06:54) (99% - 99%)    Parameters below as of 10 Jul 2023 06:54  Patient On (Oxygen Delivery Method): room air      No Known Allergies      PHYSICAL EXAM:    GENERAL: NAD,   HEAD:  Atraumatic, Normocephalic  EYES: EOMI, PERRLA, conjunctiva and sclera clear  ENMT: No tonsillar erythema, exudates, or enlargement;   NECK: Supple, No JVD, Normal thyroid  NERVOUS SYSTEM:  Alert & Oriented X1  PULM: Clear to auscultation bilaterally  CARDIAC: Regular rate and rhythm; No murmurs, rubs, or gallops  GI: Soft, Nontender, Nondistended; Bowel sounds present  EXTREMITIES:  2+ Peripheral Pulses, No clubbing, cyanosis, or edema  LYMPH: No lymphadenopathy noted  SKIN: No rashes or lesions    Consultant(s) Notes Reviewed:  [x ] YES  [ ] NO  Care Discussed with Consultants/Other Providers [ x] YES  [ ] NO    LABS:      RADIOLOGY & ADDITIONAL TESTS:    Imaging Personally Reviewed:  [ ] YES  [ ] NO  acetaminophen     Tablet .. 1000 milliGRAM(s) Oral every 8 hours  dorzolamide 2% Ophthalmic Solution 1 Drop(s) Both EYES <User Schedule>  latanoprost 0.005% Ophthalmic Solution 1 Drop(s) Both EYES at bedtime  lidocaine   4% Patch 1 Patch Transdermal every 24 hours  metoprolol succinate ER 25 milliGRAM(s) Oral daily  multivitamin 1 Tablet(s) Oral daily  pantoprazole    Tablet 40 milliGRAM(s) Oral two times a day  polyethylene glycol 3350 17 Gram(s) Oral daily  predniSONE   Tablet 3 milliGRAM(s) Oral daily  senna 2 Tablet(s) Oral at bedtime  timolol 0.5% Solution 1 Drop(s) Both EYES every 12 hours  traMADol 50 milliGRAM(s) Oral every 6 hours PRN  traMADol 25 milliGRAM(s) Oral at bedtime      HEALTH ISSUES - PROBLEM Dx:  Pelvic pain    89yo F with a PMH of HTN, HLD, CAD, Dementia(A&O1 at baseline) present for weakness and abdominal pain that started this morning. History per the ED note as patient and unable to reach daughter (Called twice with no answer). Pt noted to have dark black stools for the past few days. Pt on AC per ED. No fevers, chill, headache, recent illness/travel, cough, chest pain, or SOB.      #Melena secondary to acute blood loss anemia secondary with history of concomitant meloxicam and prednisone use   Meloxicam recently started outpt 15mg qd for hip/leg pain for Osteoarthritis  Chronic prednisone use for COPD? (started at 10mg years ago)  hold nsaids  Prednisone being slowly weaned off which may be contributing for prolonged recovery   Pantoprazole IV 40mg BID-->40mg PO BID  if subsequent hemoglobin < 8 , transfuse 1 prbc   Family does not wish to proceed with EGD - conservative management    #HTN   controlled    #Dyslipidemia     #CAD    #Dementia at baseline     #Chronic prednisone use -Titrating down from 10mg as outpt--> 3 mg prednisone, plan to titrate down to 2mg 7/15 or later this week    #Overweight BMI 26 patient needs to see dieitian outpatient for further evaluation       Total time spent to complete patient's bedside assessment, review medical chart, discuss medical plan of care with covering medical team was ____35____ with > 50% of time spent face to face w/ patient, discussion with patient/family and/or coordination of care Progress note    INTERVAL HPI/OVERNIGHT EVENTS:    Patient seen and examined at bedside. Patient in no acute distress. Daughter at bedside admits to patient still having black stools confirmed with picture from cell phone.      REVIEW OF SYSTEMS:  All other 13 Review of systems were reviewed and are negative    FAMILY HISTORY:    T(C): 36.4 (07-10-23 @ 13:40), Max: 36.4 (07-10-23 @ 13:40)  HR: 103 (07-10-23 @ 13:40) (74 - 103)  BP: 110/75 (07-10-23 @ 13:40) (110/75 - 140/66)  RR: 18 (07-10-23 @ 13:40) (18 - 18)  SpO2: 99% (07-10-23 @ 06:54) (99% - 99%)  Wt(kg): --Vital Signs Last 24 Hrs  T(C): 36.4 (10 Jul 2023 13:40), Max: 36.4 (10 Jul 2023 13:40)  T(F): 97.5 (10 Jul 2023 13:40), Max: 97.5 (10 Jul 2023 13:40)  HR: 103 (10 Jul 2023 13:40) (74 - 103)  BP: 110/75 (10 Jul 2023 13:40) (110/75 - 140/66)  BP(mean): 94 (10 Jul 2023 06:54) (94 - 94)  RR: 18 (10 Jul 2023 13:40) (18 - 18)  SpO2: 99% (10 Jul 2023 06:54) (99% - 99%)    Parameters below as of 10 Jul 2023 06:54  Patient On (Oxygen Delivery Method): room air      No Known Allergies      PHYSICAL EXAM:    GENERAL: NAD,   HEAD:  Atraumatic, Normocephalic  EYES: EOMI, PERRLA, conjunctiva and sclera clear  ENMT: No tonsillar erythema, exudates, or enlargement;   NECK: Supple, No JVD, Normal thyroid  NERVOUS SYSTEM:  Alert & Oriented X1  PULM: Clear to auscultation bilaterally  CARDIAC: Regular rate and rhythm; No murmurs, rubs, or gallops  GI: Soft, Nontender, Nondistended; Bowel sounds present  EXTREMITIES:  2+ Peripheral Pulses, No clubbing, cyanosis, or edema  LYMPH: No lymphadenopathy noted  SKIN: No rashes or lesions    Consultant(s) Notes Reviewed:  [x ] YES  [ ] NO  Care Discussed with Consultants/Other Providers [ x] YES  [ ] NO    LABS:      RADIOLOGY & ADDITIONAL TESTS:    Imaging Personally Reviewed:  [ ] YES  [ ] NO  acetaminophen     Tablet .. 1000 milliGRAM(s) Oral every 8 hours  dorzolamide 2% Ophthalmic Solution 1 Drop(s) Both EYES <User Schedule>  latanoprost 0.005% Ophthalmic Solution 1 Drop(s) Both EYES at bedtime  lidocaine   4% Patch 1 Patch Transdermal every 24 hours  metoprolol succinate ER 25 milliGRAM(s) Oral daily  multivitamin 1 Tablet(s) Oral daily  pantoprazole    Tablet 40 milliGRAM(s) Oral two times a day  polyethylene glycol 3350 17 Gram(s) Oral daily  predniSONE   Tablet 3 milliGRAM(s) Oral daily  senna 2 Tablet(s) Oral at bedtime  timolol 0.5% Solution 1 Drop(s) Both EYES every 12 hours  traMADol 50 milliGRAM(s) Oral every 6 hours PRN  traMADol 25 milliGRAM(s) Oral at bedtime      HEALTH ISSUES - PROBLEM Dx:  Pelvic pain    89yo F with a PMH of HTN, HLD, CAD, Dementia(A&O1 at baseline) present for weakness and abdominal pain that started this morning. History per the ED note as patient and unable to reach daughter (Called twice with no answer). Pt noted to have dark black stools for the past few days. Pt on AC per ED. No fevers, chill, headache, recent illness/travel, cough, chest pain, or SOB.      #Melena secondary to acute blood loss anemia secondary with history of concomitant meloxicam and prednisone use   Meloxicam recently started outpt 15mg qd for hip/leg pain for Osteoarthritis  Chronic prednisone use for COPD? (started at 10mg years ago)  hold nsaids  Prednisone being slowly weaned off which may be contributing for prolonged recovery   Pantoprazole IV 40mg BID-->40mg PO BID-->40mg IV BID (pt still having black stools)  if subsequent hemoglobin < 8 , transfuse 1 prbc   Family does not wish to proceed with EGD - conservative management    #HTN   controlled    #Dyslipidemia     #CAD    #Dementia at baseline     #Chronic prednisone use -Titrating down from 10mg as outpt--> 3 mg prednisone, plan to titrate down to 2mg 7/15 or later this week    #Overweight BMI 26 patient needs to see dieitian outpatient for further evaluation       Total time spent to complete patient's bedside assessment, review medical chart, discuss medical plan of care with covering medical team was ____35____ with > 50% of time spent face to face w/ patient, discussion with patient/family and/or coordination of care

## 2023-07-10 NOTE — CHART NOTE - NSCHARTNOTEFT_GEN_A_CORE
Registered Dietitian Follow-Up     Patient Profile Reviewed                           Yes [x]   No []     Nutrition History Previously Obtained        Yes [x]  No []       Pertinent Subjective Information:  Spoke with patient's daughter this morning - at time of visit, patient was still sleeping and did not eat breakfast yet. Overall, patient has fair appetite and PO intake. She has been drinking some of the Ensure supplement but has not been drinking too much as she feels full from eating.      Pertinent Medical Interventions:  Upper GI Bleed, Melena, Weakness and Abdominal pain - on chronic prednisone; daughter is open to EGD/colonoscopy if necessary; Decreased PO intake - likely due to delirium in the setting of advanced dementia - improved today; daughter does not think patient would want NGT or PEG; Evaluated by SLP - recommended puree texture with thin liquids; dementia; pelvic fracture; chronic L2 compression fracture;      Diet order:   Diet, Pureed:   DASH/TLC {Sodium & Cholesterol Restricted}  Supplement Feeding Modality:  Oral  Ensure Plus High Protein Cans or Servings Per Day:  2       Frequency:  Daily (23 @ 12:20) [Active]    Anthropometrics:  Height: 157.5 cm   Weight: 64.9 kg  BMI: 26.2  IBW: 50 kg     Daily Weight in k.9 (), Weight in k.6 (), Weight in k.4 ()    -very slight weight fluctuations seen     MEDICATIONS  (STANDING):  acetaminophen     Tablet .. 1000 milliGRAM(s) Oral every 8 hours  dorzolamide 2% Ophthalmic Solution 1 Drop(s) Both EYES <User Schedule>  latanoprost 0.005% Ophthalmic Solution 1 Drop(s) Both EYES at bedtime  lidocaine   4% Patch 1 Patch Transdermal every 24 hours  metoprolol succinate ER 25 milliGRAM(s) Oral daily  multivitamin 1 Tablet(s) Oral daily  pantoprazole    Tablet 40 milliGRAM(s) Oral two times a day  polyethylene glycol 3350 17 Gram(s) Oral daily  predniSONE   Tablet 3 milliGRAM(s) Oral daily  senna 2 Tablet(s) Oral at bedtime  timolol 0.5% Solution 1 Drop(s) Both EYES every 12 hours  traMADol 25 milliGRAM(s) Oral at bedtime    MEDICATIONS  (PRN):  traMADol 50 milliGRAM(s) Oral every 6 hours PRN Moderate Pain (4 - 6)    Pertinent Labs: 07-10 @ 07:52: Na 141, BUN 21<H>, Cr 0.9, <H>, K+ 4.4, Phos --, Mg 2.0, Alk Phos --, ALT/SGPT --, AST/SGOT --, HbA1c --      Physical Findings:  - Appearance: disoriented x4; no edema noted   - GI function: last BM    - Tubes: n/a - no feeding tubes   - Oral/Mouth cavity: puree texture/thin liquids   - Skin: no pressure injuries noted     Nutrition Requirements:  Weight Used: 64.9 kg - per previous RD assessment      Estimated Energy Needs    Continue [x]  Adjust []  1217 - 1318 kcal/day (MSJ x 1.2 - 1.3 SF)      Estimated Protein Needs    Continue [x]  Adjust []  78 - 84 gm/day (1.2 - 1.3 gm/kg)      Estimated Fluid Needs        Continue [x]  Adjust []  1 mL/kcal      Nutrient Intake: ~% of meals      [x] Previous Nutrition Diagnosis:  PCM            [x] Ongoing          [] Resolved     Nutrition Intervention:  meals and snacks, medical food supplements, coordination of care     Goal/Expected Outcome:   PO intake >/=75% of meals within 3-5 days      Indicator/Monitoring:   diet order, energy intake, weight, labs, skin status, NFPE      Recommendation:  1) Continue current diet order  2) Recommend to discontinue Ensure Plus HP 2x/day - would recommend to ADD Ensure Compact 3x/day (4 oz provides 220 kcal, 9 gm Protein each) to optimize kcal and protein intake - may be easier for patient to drink   3) Maximum encouragement and assistance appreciated with all meals, snacks, supplement  4) Continue to monitor BM - continue with bowel regimen as ordered  5) Continue with multivitamin for supplementation as ordered    Patient is at high nutrition risk, RD to f/u in 3-5 days or PRN    RD to remain available: Paola Tena RD [phone] or via Teams

## 2023-07-11 LAB
ALBUMIN SERPL ELPH-MCNC: 3.6 G/DL — SIGNIFICANT CHANGE UP (ref 3.5–5.2)
ALP SERPL-CCNC: 82 U/L — SIGNIFICANT CHANGE UP (ref 30–115)
ALT FLD-CCNC: 12 U/L — SIGNIFICANT CHANGE UP (ref 0–41)
ANION GAP SERPL CALC-SCNC: 12 MMOL/L — SIGNIFICANT CHANGE UP (ref 7–14)
AST SERPL-CCNC: 17 U/L — SIGNIFICANT CHANGE UP (ref 0–41)
BASOPHILS # BLD AUTO: 0.05 K/UL — SIGNIFICANT CHANGE UP (ref 0–0.2)
BASOPHILS NFR BLD AUTO: 0.7 % — SIGNIFICANT CHANGE UP (ref 0–1)
BILIRUB SERPL-MCNC: 1.4 MG/DL — HIGH (ref 0.2–1.2)
BUN SERPL-MCNC: 23 MG/DL — HIGH (ref 10–20)
CALCIUM SERPL-MCNC: 9 MG/DL — SIGNIFICANT CHANGE UP (ref 8.4–10.5)
CHLORIDE SERPL-SCNC: 102 MMOL/L — SIGNIFICANT CHANGE UP (ref 98–110)
CO2 SERPL-SCNC: 24 MMOL/L — SIGNIFICANT CHANGE UP (ref 17–32)
CREAT SERPL-MCNC: 1 MG/DL — SIGNIFICANT CHANGE UP (ref 0.7–1.5)
EGFR: 53 ML/MIN/1.73M2 — LOW
EOSINOPHIL # BLD AUTO: 0.26 K/UL — SIGNIFICANT CHANGE UP (ref 0–0.7)
EOSINOPHIL NFR BLD AUTO: 3.5 % — SIGNIFICANT CHANGE UP (ref 0–8)
GLUCOSE SERPL-MCNC: 206 MG/DL — HIGH (ref 70–99)
HCT VFR BLD CALC: 34.4 % — LOW (ref 37–47)
HGB BLD-MCNC: 10.8 G/DL — LOW (ref 12–16)
IMM GRANULOCYTES NFR BLD AUTO: 0.8 % — HIGH (ref 0.1–0.3)
LYMPHOCYTES # BLD AUTO: 0.97 K/UL — LOW (ref 1.2–3.4)
LYMPHOCYTES # BLD AUTO: 12.9 % — LOW (ref 20.5–51.1)
MAGNESIUM SERPL-MCNC: 1.9 MG/DL — SIGNIFICANT CHANGE UP (ref 1.8–2.4)
MCHC RBC-ENTMCNC: 28.8 PG — SIGNIFICANT CHANGE UP (ref 27–31)
MCHC RBC-ENTMCNC: 31.4 G/DL — LOW (ref 32–37)
MCV RBC AUTO: 91.7 FL — SIGNIFICANT CHANGE UP (ref 81–99)
MONOCYTES # BLD AUTO: 0.79 K/UL — HIGH (ref 0.1–0.6)
MONOCYTES NFR BLD AUTO: 10.5 % — HIGH (ref 1.7–9.3)
NEUTROPHILS # BLD AUTO: 5.38 K/UL — SIGNIFICANT CHANGE UP (ref 1.4–6.5)
NEUTROPHILS NFR BLD AUTO: 71.6 % — SIGNIFICANT CHANGE UP (ref 42.2–75.2)
NRBC # BLD: 0 /100 WBCS — SIGNIFICANT CHANGE UP (ref 0–0)
PLATELET # BLD AUTO: 272 K/UL — SIGNIFICANT CHANGE UP (ref 130–400)
PMV BLD: 10.1 FL — SIGNIFICANT CHANGE UP (ref 7.4–10.4)
POTASSIUM SERPL-MCNC: 4.1 MMOL/L — SIGNIFICANT CHANGE UP (ref 3.5–5)
POTASSIUM SERPL-SCNC: 4.1 MMOL/L — SIGNIFICANT CHANGE UP (ref 3.5–5)
PROT SERPL-MCNC: 5.8 G/DL — LOW (ref 6–8)
RBC # BLD: 3.75 M/UL — LOW (ref 4.2–5.4)
RBC # FLD: 15.9 % — HIGH (ref 11.5–14.5)
SODIUM SERPL-SCNC: 138 MMOL/L — SIGNIFICANT CHANGE UP (ref 135–146)
WBC # BLD: 7.51 K/UL — SIGNIFICANT CHANGE UP (ref 4.8–10.8)
WBC # FLD AUTO: 7.51 K/UL — SIGNIFICANT CHANGE UP (ref 4.8–10.8)

## 2023-07-11 PROCEDURE — 99232 SBSQ HOSP IP/OBS MODERATE 35: CPT

## 2023-07-11 PROCEDURE — 99233 SBSQ HOSP IP/OBS HIGH 50: CPT

## 2023-07-11 RX ORDER — PANTOPRAZOLE SODIUM 20 MG/1
40 TABLET, DELAYED RELEASE ORAL
Refills: 0 | Status: DISCONTINUED | OUTPATIENT
Start: 2023-07-11 | End: 2023-07-12

## 2023-07-11 RX ADMIN — DORZOLAMIDE HYDROCHLORIDE 1 DROP(S): 20 SOLUTION/ DROPS OPHTHALMIC at 17:46

## 2023-07-11 RX ADMIN — Medication 1 DROP(S): at 17:47

## 2023-07-11 RX ADMIN — LIDOCAINE 1 PATCH: 4 CREAM TOPICAL at 19:16

## 2023-07-11 RX ADMIN — PANTOPRAZOLE SODIUM 40 MILLIGRAM(S): 20 TABLET, DELAYED RELEASE ORAL at 09:35

## 2023-07-11 RX ADMIN — TRAMADOL HYDROCHLORIDE 25 MILLIGRAM(S): 50 TABLET ORAL at 22:15

## 2023-07-11 RX ADMIN — Medication 25 MILLIGRAM(S): at 09:35

## 2023-07-11 RX ADMIN — LATANOPROST 1 DROP(S): 0.05 SOLUTION/ DROPS OPHTHALMIC; TOPICAL at 21:28

## 2023-07-11 RX ADMIN — DORZOLAMIDE HYDROCHLORIDE 1 DROP(S): 20 SOLUTION/ DROPS OPHTHALMIC at 09:36

## 2023-07-11 RX ADMIN — Medication 1 DROP(S): at 09:37

## 2023-07-11 RX ADMIN — Medication 1000 MILLIGRAM(S): at 09:35

## 2023-07-11 RX ADMIN — Medication 1 TABLET(S): at 12:28

## 2023-07-11 RX ADMIN — LIDOCAINE 1 PATCH: 4 CREAM TOPICAL at 07:54

## 2023-07-11 RX ADMIN — Medication 3 MILLIGRAM(S): at 09:35

## 2023-07-11 RX ADMIN — Medication 1000 MILLIGRAM(S): at 10:51

## 2023-07-11 RX ADMIN — PANTOPRAZOLE SODIUM 40 MILLIGRAM(S): 20 TABLET, DELAYED RELEASE ORAL at 17:46

## 2023-07-11 RX ADMIN — TRAMADOL HYDROCHLORIDE 25 MILLIGRAM(S): 50 TABLET ORAL at 21:26

## 2023-07-11 NOTE — PROGRESS NOTE ADULT - ASSESSMENT
Assessment and Plan  Case of a 89 yo F with a PMH of HTN, HLD, CAD, Dementia(A&O1 at baseline) present for weakness and abdominal pain that started this morning. Daughter reports dark stool since yesterday and lower abdominal pain. history was obtained form the daughter at bedside. Daughter denies any vomiting, hematochezia, hematemesis or weight loss. Patient is hemodynamically stable, Hemoglobin on admission is 9.4 (baseline 10-11). labs significant for elevated lactate and leukocytosis. GI consulted to r/o melena.        Reported Melena   Anemia  * Hb 9.4 on admission 06/29 -> 06/30 Hb 7.4 s/p 1 unit pRBC -> 07/03 9.3 -> 07/06 8.2/8.4, lactate normalized  * JONG dark brown stool 07/06 no melena  * Had melena on 06/30, 07/05, and 07/11  * Hemodynamically stable    PLAN:   - After discussion with Patient's daughter again on 07/11, she still prefers to avoid any endoscopy or anesthesia requiring procedure since it is not lifesaving. She would like to monitor Hb on 07/12 and decide. Risks outweigh benefits in setting of absence of active bleed or drop in Hb  - Repeat CBC in PM noted: stable. Monitor CBC, transfuse to keep hemoglobin > 8.   - Monitor BM   - Can switch to PO PPI BID  - Avoid NSAIDs  - Continue soft bite sized diet      Elevated T bilirubin  * T bili 0.7 on 06/29 -> 07/02 and 07/03 1.3 -> no repeat  * ALP 59, AST 29, aLT 17 07/02  * CT AP IC 06/30 noted with no hepatobiliary findings     PLAN  - Trend LFTs for now  - Consider RUQ US if LFTs worsen  - Avoid hepatotoxic agents      Thank you for your consult.  - Please note that plan was communicated with medical team.   - Please reach GI on [phone] during weekdays till 5pm.  - Please call the GI service line after 5pm on Weekdays and anytime on Weekends: [phone].      Tayo Funes MD  PGY - 4 Gastroenterology Fellow   [facility]

## 2023-07-11 NOTE — PROGRESS NOTE ADULT - SUBJECTIVE AND OBJECTIVE BOX
Gastroenterology  Follow Up Note    Location: [address]) [address] ([address]  Patient Name: KIN HERNANDEZ  Age: 89y  Gender: Female      Chief Complaint  Patient is a 89y old Female who presents with a chief complaint of Melena (09 Jul 2023 04:44)  Primary diagnosis of Melena        Reason for Consult  Reported melena and anemia      Progress Note  This morning patient was seen and examined at bedside.    Today is hospital day 11d.  Patient is doing fine. No acute events overnight.       Home Medications:  Home Medications:  acetaminophen 325 mg oral tablet: 2 tab(s) orally every 6 hours (21 Jun 2022 12:32)  atorvastatin 10 mg tablet:  (14 Jun 2022 01:26)  brimonidine 0.15 % eye drops: 1 milliliter(s) to each affected eye 3 times a day (21 Jun 2022 12:32)  furosemide 20 mg tablet:  (14 Jun 2022 01:26)  gabapentin 250 mg/5 mL oral solution: 2 milliliter(s) orally 3 times a day (21 Jun 2022 12:32)  latanoprost 0.005 % eye drops: 1 milliliter(s) to each affected eye once a day (at bedtime) (21 Jun 2022 12:32)  METOPROLOL SUCC ER 25 MG TAB: 1 tab(s) orally once a day (21 Jun 2022 12:32)  PREDNISONE 5 MG TABLET: 1 tab(s) orally once a day (21 Jun 2022 12:32)  valsartan 80 mg tablet:  (14 Jun 2022 01:26)      Vital Signs in the last 24 hours   Vitals Summary T(C): 36.2 (07-11-23 @ 15:16), Max: 36.2 (07-11-23 @ 15:16)  HR: 104 (07-11-23 @ 15:16) (104 - 122)  BP: 131/68 (07-11-23 @ 15:16) (107/74 - 131/68)  RR: 18 (07-11-23 @ 15:16) (18 - 18)  SpO2: --  Vent Data   Intake/ Output   Measurements       Physical Exam  * General Appearance: AOx1  * Head: Normocephalic, without obvious abnormality, atraumatic  * Lungs: Respirations unlabored, Good bilateral air entry, normal breath sounds   * Heart: irregular Rate and Rhythm  * Abdomen: Symmetric, non-distended, soft, non-tender, bowel sounds active all four quadrants, no masses, no organomegaly (no hepatosplenomegaly)  * JONG dark brown stool      Investigations   Laboratory Workup      - CBC:                        10.8   7.51  )-----------( 272      ( 11 Jul 2023 09:26 )             34.4       - Hgb Trend:  10.8  07-11-23 @ 09:26  9.9  07-10-23 @ 07:52  9.2  07-09-23 @ 17:27      07-09-23 @ 07:01  -  07-10-23 @ 07:00  --------------------------------------------------------  IN: 339 mL        - Chemistry:  07-11    138  |  102  |  23<H>  ----------------------------<  206<H>  4.1   |  24  |  1.0    Ca    9.0      11 Jul 2023 09:26  Mg     1.9     07-11    TPro  5.8<L>  /  Alb  3.6  /  TBili  1.4<H>  /  DBili  x   /  AST  17  /  ALT  12  /  AlkPhos  82  07-11    Liver panel trend:  TBili 1.4   /   AST 17   /   ALT 12   /   AlkP 82   /   Tptn 5.8   /   Alb 3.6    /   DBili --      07-11  TBili 0.9   /   AST 18   /   ALT 13   /   AlkP 58   /   Tptn 4.9   /   Alb 3.2    /   DBili --      07-08  TBili 1.3   /   AST 28   /   ALT 17   /   AlkP 57   /   Tptn 5.3   /   Alb 3.4    /   DBili --      07-03  TBili 1.3   /   AST 29   /   ALT 17   /   AlkP 59   /   Tptn 5.3   /   Alb 3.4    /   DBili --      07-02      - Coagulation Studies:      - ABG:      - Cardiac Markers:        Microbiological Workup  Urinalysis Basic - ( 11 Jul 2023 09:26 )    Color: x / Appearance: x / SG: x / pH: x  Gluc: 206 mg/dL / Ketone: x  / Bili: x / Urobili: x   Blood: x / Protein: x / Nitrite: x   Leuk Esterase: x / RBC: x / WBC x   Sq Epi: x / Non Sq Epi: x / Bacteria: x        Current Medications  Standing Medications  acetaminophen     Tablet .. 1000 milliGRAM(s) Oral every 8 hours  dorzolamide 2% Ophthalmic Solution 1 Drop(s) Both EYES <User Schedule>  latanoprost 0.005% Ophthalmic Solution 1 Drop(s) Both EYES at bedtime  lidocaine   4% Patch 1 Patch Transdermal every 24 hours  metoprolol succinate ER 25 milliGRAM(s) Oral daily  multivitamin 1 Tablet(s) Oral daily  pantoprazole    Tablet 40 milliGRAM(s) Oral two times a day  polyethylene glycol 3350 17 Gram(s) Oral daily  senna 2 Tablet(s) Oral at bedtime  timolol 0.5% Solution 1 Drop(s) Both EYES every 12 hours  traMADol 25 milliGRAM(s) Oral at bedtime    PRN Medications  traMADol 50 milliGRAM(s) Oral every 6 hours PRN Moderate Pain (4 - 6)    Singles Doses Administered  (ADM OVERRIDE) 1 each &lt;see task&gt; GiveOnce  (ADM OVERRIDE) 1 each &lt;see task&gt; GiveOnce  (ADM OVERRIDE) 1 each &lt;see task&gt; GiveOnce  (ADM OVERRIDE) 1 each &lt;see task&gt; GiveOnce  haloperidol    Injectable 5 milliGRAM(s) IntraMuscular Once  haloperidol    Injectable 1 milliGRAM(s) IntraMuscular once  LORazepam   Injectable 0.5 milliGRAM(s) IV Push once  magnesium sulfate  IVPB 2 Gram(s) IV Intermittent once  metoprolol tartrate Injectable 5 milliGRAM(s) IV Push once  metoprolol tartrate Injectable 5 milliGRAM(s) IV Push once  metoprolol tartrate Injectable 5 milliGRAM(s) IV Push once  midazolam Injectable 1 milliGRAM(s) IV Push Once  midazolam Injectable 1 milliGRAM(s) IV Push Once  morphine  - Injectable 1 milliGRAM(s) IV Push once  morphine  - Injectable 4 milliGRAM(s) IV Push Once  pantoprazole  Injectable 40 milliGRAM(s) IV Push Once  potassium chloride   Powder 40 milliEquivalent(s) Oral once  predniSONE   Tablet 5 milliGRAM(s) Oral once  sodium chloride 0.9% Bolus 1000 milliLiter(s) IV Bolus once

## 2023-07-11 NOTE — PROGRESS NOTE ADULT - NUTRITIONAL ASSESSMENT
This patient has been assessed with a concern for Malnutrition and has been determined to have a diagnosis/diagnoses of Severe protein-calorie malnutrition.    This patient is being managed with:   Diet Pureed-  DASH/TLC {Sodium & Cholesterol Restricted}  Supplement Feeding Modality:  Oral  Ensure Compact Cans or Servings Per Day:  1       Frequency:  Three Times a day  Entered: Jul 10 2023  4:14PM  
This patient has been assessed with a concern for Malnutrition and has been determined to have a diagnosis/diagnoses of Severe protein-calorie malnutrition.    This patient is being managed with:   Diet Pureed-  DASH/TLC {Sodium & Cholesterol Restricted}  Supplement Feeding Modality:  Oral  Ensure Plus High Protein Cans or Servings Per Day:  2       Frequency:  Daily  Entered: Jul 7 2023 12:21PM  
This patient has been assessed with a concern for Malnutrition and has been determined to have a diagnosis/diagnoses of Severe protein-calorie malnutrition.    This patient is being managed with:   Diet Pureed-  DASH/TLC {Sodium & Cholesterol Restricted}  Supplement Feeding Modality:  Oral  Ensure Compact Cans or Servings Per Day:  1       Frequency:  Three Times a day  Entered: Jul 10 2023  4:14PM  
This patient has been assessed with a concern for Malnutrition and has been determined to have a diagnosis/diagnoses of Severe protein-calorie malnutrition.    This patient is being managed with:   Diet Pureed-  DASH/TLC {Sodium & Cholesterol Restricted}  Supplement Feeding Modality:  Oral  Ensure Plus High Protein Cans or Servings Per Day:  2       Frequency:  Daily  Entered: Jul 7 2023 12:21PM  
This patient has been assessed with a concern for Malnutrition and has been determined to have a diagnosis/diagnoses of Severe protein-calorie malnutrition.    This patient is being managed with:   Diet Soft and Bite Sized-  Fiber/Residue Restricted  Supplement Feeding Modality:  Oral  Ensure Plus High Protein Cans or Servings Per Day:  2       Frequency:  Daily  Entered: Jul 2 2023  3:45PM  
This patient has been assessed with a concern for Malnutrition and has been determined to have a diagnosis/diagnoses of Severe protein-calorie malnutrition.    This patient is being managed with:   Diet Soft and Bite Sized-  Fiber/Residue Restricted  Supplement Feeding Modality:  Oral  Ensure Plus High Protein Cans or Servings Per Day:  2       Frequency:  Daily  Entered: Jul 2 2023  3:45PM  
This patient has been assessed with a concern for Malnutrition and has been determined to have a diagnosis/diagnoses of Severe protein-calorie malnutrition.    This patient is being managed with:   Diet Pureed-  DASH/TLC {Sodium & Cholesterol Restricted}  Supplement Feeding Modality:  Oral  Ensure Plus High Protein Cans or Servings Per Day:  2       Frequency:  Daily  Entered: Jul 7 2023 12:21PM

## 2023-07-11 NOTE — PROGRESS NOTE ADULT - SUBJECTIVE AND OBJECTIVE BOX
Progress note    INTERVAL HPI/OVERNIGHT EVENTS:    Patient seen and examined at bedside. She is a poor historian. She does not appear in any acute distress. BLack stool last night endorse by family at bedside.      REVIEW OF SYSTEMS:  All other 13 Review of systems were reviewed and are negative    FAMILY HISTORY:    T(C): 36.2 (07-11-23 @ 15:16), Max: 36.2 (07-11-23 @ 15:16)  HR: 104 (07-11-23 @ 15:16) (104 - 122)  BP: 131/68 (07-11-23 @ 15:16) (107/74 - 131/68)  RR: 18 (07-11-23 @ 15:16) (18 - 18)  SpO2: --  Wt(kg): --Vital Signs Last 24 Hrs  T(C): 36.2 (11 Jul 2023 15:16), Max: 36.2 (11 Jul 2023 15:16)  T(F): 97.2 (11 Jul 2023 15:16), Max: 97.2 (11 Jul 2023 15:16)  HR: 104 (11 Jul 2023 15:16) (104 - 122)  BP: 131/68 (11 Jul 2023 15:16) (107/74 - 131/68)  BP(mean): 86 (11 Jul 2023 09:35) (86 - 86)  RR: 18 (11 Jul 2023 15:16) (18 - 18)  SpO2: --      No Known Allergies      PHYSICAL EXAM:    GENERAL: NAD,   HEAD:  Atraumatic, Normocephalic  EYES: EOMI, PERRLA, conjunctiva and sclera clear  ENMT: No tonsillar erythema, exudates, or enlargement;   NECK: Supple, No JVD, Normal thyroid  NERVOUS SYSTEM:  Alert & Oriented X1  PULM: Clear to auscultation bilaterally  CARDIAC: Regular rate and rhythm; No murmurs, rubs, or gallops  GI: Soft, Nontender, Nondistended; Bowel sounds present  EXTREMITIES:  2+ Peripheral Pulses, No clubbing, cyanosis, or edema  LYMPH: No lymphadenopathy noted  SKIN: No rashes or lesions    Consultant(s) Notes Reviewed:  [x ] YES  [ ] NO  Care Discussed with Consultants/Other Providers [ x] YES  [ ] NO    LABS:      RADIOLOGY & ADDITIONAL TESTS:    Imaging Personally Reviewed:  [ ] YES  [ ] NO  acetaminophen     Tablet .. 1000 milliGRAM(s) Oral every 8 hours  dorzolamide 2% Ophthalmic Solution 1 Drop(s) Both EYES <User Schedule>  latanoprost 0.005% Ophthalmic Solution 1 Drop(s) Both EYES at bedtime  lidocaine   4% Patch 1 Patch Transdermal every 24 hours  metoprolol succinate ER 25 milliGRAM(s) Oral daily  multivitamin 1 Tablet(s) Oral daily  pantoprazole    Tablet 40 milliGRAM(s) Oral two times a day  polyethylene glycol 3350 17 Gram(s) Oral daily  senna 2 Tablet(s) Oral at bedtime  timolol 0.5% Solution 1 Drop(s) Both EYES every 12 hours  traMADol 25 milliGRAM(s) Oral at bedtime  traMADol 50 milliGRAM(s) Oral every 6 hours PRN      HEALTH ISSUES - PROBLEM Dx:  Pelvic pain    89yo F with a PMH of HTN, HLD, CAD, Dementia(A&O1 at baseline) present for weakness and abdominal pain that started this morning. History per the ED note as patient and unable to reach daughter (Called twice with no answer). Pt noted to have dark black stools for the past few days. Pt on AC per ED. No fevers, chill, headache, recent illness/travel, cough, chest pain, or SOB.    #Melena secondary to acute blood loss anemia secondary with history of concomitant meloxicam and prednisone use   Meloxicam recently started outpt 15mg qd for hip/leg pain for Osteoarthritis  Chronic prednisone use for COPD? (started at 10mg years ago)  hold nsaids  Prednisone (taken for "lung issue" for years) being slowly weaned off which may be contributing for prolonged recovery   Pantoprazole IV 40mg BID-->40mg PO BID--> (pt still having black stools)  if subsequent hemoglobin < 8 , transfuse 1 prbc   Family does not wish to proceed with EGD - conservative management    #HTN   controlled    #Dyslipidemia     #CAD    #Dementia at baseline     #Chronic prednisone use -Titrating down from 10mg as outpt--> 3 mg prednisone, plan to titrate down to 2mg 7/15 or later this week    #Overweight BMI 26 patient needs to see dieitian outpatient for further evaluation     Dispo: Still having black stools but pt is HD stable and Hb is improving. Possible dc in 48 hours if Hb and BP remains stable. Slow taper for prednisone      Total time spent to complete patient's bedside assessment, review medical chart, discuss medical plan of care with covering medical team was ____35____ with > 50% of time spent face to face w/ patient, discussion with patient/family and/or coordination of care

## 2023-07-12 ENCOUNTER — TRANSCRIPTION ENCOUNTER (OUTPATIENT)
Age: 88
End: 2023-07-12

## 2023-07-12 VITALS
SYSTOLIC BLOOD PRESSURE: 117 MMHG | DIASTOLIC BLOOD PRESSURE: 87 MMHG | TEMPERATURE: 97 F | RESPIRATION RATE: 18 BRPM | HEART RATE: 72 BPM

## 2023-07-12 LAB
ALBUMIN SERPL ELPH-MCNC: 3.4 G/DL — LOW (ref 3.5–5.2)
ALP SERPL-CCNC: 74 U/L — SIGNIFICANT CHANGE UP (ref 30–115)
ALT FLD-CCNC: 12 U/L — SIGNIFICANT CHANGE UP (ref 0–41)
ANION GAP SERPL CALC-SCNC: 9 MMOL/L — SIGNIFICANT CHANGE UP (ref 7–14)
AST SERPL-CCNC: 14 U/L — SIGNIFICANT CHANGE UP (ref 0–41)
BASOPHILS # BLD AUTO: 0.04 K/UL — SIGNIFICANT CHANGE UP (ref 0–0.2)
BASOPHILS NFR BLD AUTO: 0.7 % — SIGNIFICANT CHANGE UP (ref 0–1)
BILIRUB SERPL-MCNC: 1 MG/DL — SIGNIFICANT CHANGE UP (ref 0.2–1.2)
BUN SERPL-MCNC: 24 MG/DL — HIGH (ref 10–20)
CALCIUM SERPL-MCNC: 8.9 MG/DL — SIGNIFICANT CHANGE UP (ref 8.4–10.4)
CHLORIDE SERPL-SCNC: 104 MMOL/L — SIGNIFICANT CHANGE UP (ref 98–110)
CO2 SERPL-SCNC: 25 MMOL/L — SIGNIFICANT CHANGE UP (ref 17–32)
CREAT SERPL-MCNC: 0.9 MG/DL — SIGNIFICANT CHANGE UP (ref 0.7–1.5)
EGFR: 60 ML/MIN/1.73M2 — SIGNIFICANT CHANGE UP
EOSINOPHIL # BLD AUTO: 0.18 K/UL — SIGNIFICANT CHANGE UP (ref 0–0.7)
EOSINOPHIL NFR BLD AUTO: 3.3 % — SIGNIFICANT CHANGE UP (ref 0–8)
GLUCOSE SERPL-MCNC: 225 MG/DL — HIGH (ref 70–99)
HCT VFR BLD CALC: 30.9 % — LOW (ref 37–47)
HGB BLD-MCNC: 9.9 G/DL — LOW (ref 12–16)
IMM GRANULOCYTES NFR BLD AUTO: 0.5 % — HIGH (ref 0.1–0.3)
LYMPHOCYTES # BLD AUTO: 0.59 K/UL — LOW (ref 1.2–3.4)
LYMPHOCYTES # BLD AUTO: 10.8 % — LOW (ref 20.5–51.1)
MAGNESIUM SERPL-MCNC: 1.9 MG/DL — SIGNIFICANT CHANGE UP (ref 1.8–2.4)
MCHC RBC-ENTMCNC: 29.7 PG — SIGNIFICANT CHANGE UP (ref 27–31)
MCHC RBC-ENTMCNC: 32 G/DL — SIGNIFICANT CHANGE UP (ref 32–37)
MCV RBC AUTO: 92.8 FL — SIGNIFICANT CHANGE UP (ref 81–99)
MONOCYTES # BLD AUTO: 0.48 K/UL — SIGNIFICANT CHANGE UP (ref 0.1–0.6)
MONOCYTES NFR BLD AUTO: 8.8 % — SIGNIFICANT CHANGE UP (ref 1.7–9.3)
NEUTROPHILS # BLD AUTO: 4.15 K/UL — SIGNIFICANT CHANGE UP (ref 1.4–6.5)
NEUTROPHILS NFR BLD AUTO: 75.9 % — HIGH (ref 42.2–75.2)
NRBC # BLD: 0 /100 WBCS — SIGNIFICANT CHANGE UP (ref 0–0)
PLATELET # BLD AUTO: 239 K/UL — SIGNIFICANT CHANGE UP (ref 130–400)
PMV BLD: 9.9 FL — SIGNIFICANT CHANGE UP (ref 7.4–10.4)
POTASSIUM SERPL-MCNC: 4 MMOL/L — SIGNIFICANT CHANGE UP (ref 3.5–5)
POTASSIUM SERPL-SCNC: 4 MMOL/L — SIGNIFICANT CHANGE UP (ref 3.5–5)
PROT SERPL-MCNC: 5.4 G/DL — LOW (ref 6–8)
RBC # BLD: 3.33 M/UL — LOW (ref 4.2–5.4)
RBC # FLD: 15.9 % — HIGH (ref 11.5–14.5)
SARS-COV-2 RNA SPEC QL NAA+PROBE: SIGNIFICANT CHANGE UP
SODIUM SERPL-SCNC: 138 MMOL/L — SIGNIFICANT CHANGE UP (ref 135–146)
WBC # BLD: 5.47 K/UL — SIGNIFICANT CHANGE UP (ref 4.8–10.8)
WBC # FLD AUTO: 5.47 K/UL — SIGNIFICANT CHANGE UP (ref 4.8–10.8)

## 2023-07-12 PROCEDURE — 99239 HOSP IP/OBS DSCHRG MGMT >30: CPT

## 2023-07-12 RX ORDER — ACETAMINOPHEN 500 MG
2 TABLET ORAL
Qty: 0 | Refills: 0 | DISCHARGE
Start: 2023-07-12

## 2023-07-12 RX ORDER — FUROSEMIDE 40 MG
0 TABLET ORAL
Qty: 0 | Refills: 2 | DISCHARGE

## 2023-07-12 RX ORDER — DORZOLAMIDE HYDROCHLORIDE 20 MG/ML
0 SOLUTION/ DROPS OPHTHALMIC
Qty: 0 | Refills: 0 | DISCHARGE

## 2023-07-12 RX ORDER — TRAMADOL HYDROCHLORIDE 50 MG/1
0.5 TABLET ORAL
Qty: 0 | Refills: 0 | DISCHARGE
Start: 2023-07-12

## 2023-07-12 RX ORDER — NETARSUDIL AND LATANOPROST OPHTHALMIC SOLUTION, 0.02%/0.005% .2; .05 MG/ML; MG/ML
1 SOLUTION/ DROPS OPHTHALMIC; TOPICAL
Refills: 0 | DISCHARGE

## 2023-07-12 RX ORDER — HALOPERIDOL DECANOATE 100 MG/ML
0.5 INJECTION INTRAMUSCULAR ONCE
Refills: 0 | Status: COMPLETED | OUTPATIENT
Start: 2023-07-12 | End: 2023-07-12

## 2023-07-12 RX ORDER — PANTOPRAZOLE SODIUM 20 MG/1
1 TABLET, DELAYED RELEASE ORAL
Qty: 0 | Refills: 0 | DISCHARGE
Start: 2023-07-12

## 2023-07-12 RX ORDER — VALSARTAN 80 MG/1
0 TABLET ORAL
Qty: 0 | Refills: 0 | DISCHARGE

## 2023-07-12 RX ORDER — BRIMONIDINE TARTRATE 2 MG/MG
1 SOLUTION/ DROPS OPHTHALMIC
Qty: 0 | Refills: 10 | DISCHARGE

## 2023-07-12 RX ORDER — METOPROLOL TARTRATE 50 MG
1 TABLET ORAL
Qty: 0 | Refills: 3 | DISCHARGE

## 2023-07-12 RX ORDER — SENNA PLUS 8.6 MG/1
2 TABLET ORAL
Qty: 0 | Refills: 0 | DISCHARGE
Start: 2023-07-12

## 2023-07-12 RX ORDER — ATORVASTATIN CALCIUM 80 MG/1
0 TABLET, FILM COATED ORAL
Qty: 0 | Refills: 1 | DISCHARGE

## 2023-07-12 RX ORDER — ASPIRIN/CALCIUM CARB/MAGNESIUM 324 MG
1 TABLET ORAL
Refills: 0 | DISCHARGE

## 2023-07-12 RX ORDER — LATANOPROST 0.05 MG/ML
1 SOLUTION/ DROPS OPHTHALMIC; TOPICAL
Qty: 0 | Refills: 8 | DISCHARGE

## 2023-07-12 RX ADMIN — DORZOLAMIDE HYDROCHLORIDE 1 DROP(S): 20 SOLUTION/ DROPS OPHTHALMIC at 07:58

## 2023-07-12 RX ADMIN — Medication 1 DROP(S): at 07:58

## 2023-07-12 RX ADMIN — LIDOCAINE 1 PATCH: 4 CREAM TOPICAL at 07:59

## 2023-07-12 RX ADMIN — Medication 25 MILLIGRAM(S): at 07:58

## 2023-07-12 RX ADMIN — Medication 1000 MILLIGRAM(S): at 15:04

## 2023-07-12 RX ADMIN — LIDOCAINE 1 PATCH: 4 CREAM TOPICAL at 08:00

## 2023-07-12 RX ADMIN — Medication 500 MILLIGRAM(S): at 14:36

## 2023-07-12 RX ADMIN — Medication 3 MILLIGRAM(S): at 11:51

## 2023-07-12 RX ADMIN — Medication 1 TABLET(S): at 11:47

## 2023-07-12 RX ADMIN — HALOPERIDOL DECANOATE 0.5 MILLIGRAM(S): 100 INJECTION INTRAMUSCULAR at 02:49

## 2023-07-12 RX ADMIN — PANTOPRAZOLE SODIUM 40 MILLIGRAM(S): 20 TABLET, DELAYED RELEASE ORAL at 07:59

## 2023-07-12 NOTE — DISCHARGE NOTE PROVIDER - NSDCCPCAREPLAN_GEN_ALL_CORE_FT
PRINCIPAL DISCHARGE DIAGNOSIS  Diagnosis: Melena  Assessment and Plan of Treatment: You came in for black stools which means an area in your stomach / GI tract could be bleeding slowly. We stopped the NSAIDs that you were on because they cause more bleeding, and we started tapering off your steroid use. Please continue to taper off until you stop the prednisone. Please follow up with a gastroenterologist outpatient for possible endoscopy.      SECONDARY DISCHARGE DIAGNOSES  Diagnosis: Weakness  Assessment and Plan of Treatment:

## 2023-07-12 NOTE — DISCHARGE NOTE PROVIDER - ATTENDING DISCHARGE PHYSICAL EXAMINATION:
Patient seen and examined at bedside with daughter Joanna at bedside. Daughter showed me picture of last two BM's this morning, which were brown. Hb relatively stable around 10. No overt bleeding. Vitals stable. Stable for d/c to [facility]. Daughter still prefers to avoid any endoscopy or anesthesia requiring procedure since it is not lifesaving. She would like to monitor Hb as outpatient. Risks outweigh benefits in setting of absence of active bleed or drop in Hb.     PHYSICAL EXAM:    GENERAL: NAD,   HEAD:  Atraumatic, Normocephalic  EYES: EOMI, PERRLA, conjunctiva and sclera clear  ENMT: No tonsillar erythema, exudates, or enlargement;   NECK: Supple, No JVD, Normal thyroid  NERVOUS SYSTEM:  Alert & Oriented X1  PULM: Clear to auscultation bilaterally  CARDIAC: Regular rate and rhythm; No murmurs, rubs, or gallops  GI: Soft, Nontender, Nondistended; Bowel sounds present  EXTREMITIES:  2+ Peripheral Pulses, No clubbing, cyanosis, or edema  LYMPH: No lymphadenopathy noted  SKIN: No rashes or lesions    Consultant(s) Notes Reviewed:  [x ] YES  [ ] NO  Care Discussed with Consultants/Other Providers [ x] YES  [ ] NO

## 2023-07-12 NOTE — DISCHARGE NOTE NURSING/CASE MANAGEMENT/SOCIAL WORK - PATIENT PORTAL LINK FT
You can access the FollowMyHealth Patient Portal offered by [facility] by registering at the following website: [email or web address]. By joining [facility]’s FollowMyHealth portal, you will also be able to view your health information using other applications (apps) compatible with our system.
Pt to demonstrate improved tolerance to po diet order with at least 50% po intake over next 4 days

## 2023-07-12 NOTE — DISCHARGE NOTE PROVIDER - NSDCMRMEDTOKEN_GEN_ALL_CORE_FT
acetaminophen 325 mg oral tablet: 2 tab(s) orally every 6 hours  atorvastatin 10 mg tablet:   brimonidine 0.15 % eye drops: 1 milliliter(s) to each affected eye 3 times a day  furosemide 20 mg tablet:   gabapentin 250 mg/5 mL oral solution: 2 milliliter(s) orally 3 times a day  latanoprost 0.005 % eye drops: 1 milliliter(s) to each affected eye once a day (at bedtime)  METOPROLOL SUCC ER 25 MG TAB: 1 tab(s) orally once a day  PREDNISONE 5 MG TABLET: 1 tab(s) orally once a day  valsartan 80 mg tablet:    atorvastatin 10 mg tablet:   brimonidine 0.15 % eye drops: 1 milliliter(s) to each affected eye 3 times a day  dorzolamide 2% preservative-free ophthalmic solution: to each affected eye 2 times a day  METOPROLOL SUCC ER 25 MG TAB: 1 tab(s) orally once a day  Multiple Vitamins oral tablet: 1 tab(s) orally once a day  pantoprazole 40 mg oral delayed release tablet: 1 tab(s) orally 2 times a day  predniSONE 1 mg oral tablet: 1 tab(s) orally once a day 3 tablets on 7/12 and 7/13 followed by 2 tablets from 7/14 until 7/18 then 1 tablet from 7/15 until 7/19 then stop  [place] 0.02%-0.005% ophthalmic solution: 1 in each affected eye once a day (at bedtime)  senna leaf extract oral tablet: 2 tab(s) orally once a day (at bedtime)  traMADol 50 mg oral tablet: 0.5 tab(s) orally once a day (at bedtime)  Tylenol 325 mg oral tablet: 2 tab(s) orally every 6 hours as needed for  moderate pain

## 2023-07-12 NOTE — DISCHARGE NOTE PROVIDER - HOSPITAL COURSE
89yo F with a PMH of HTN, HLD, CAD, Dementia(A&O1 at baseline) present for weakness and abdominal pain that started this morning. History per the ED note as patient and unable to reach daughter (Called twice with no answer). Pt noted to have dark black stools for the past few days. Pt on AC per ED. No fevers, chill, headache, recent illness/travel, cough, chest pain, or SOB.    In the ED,   Vitals: /60, HR 96, RR 16, T 98.1, satting 98 percent on RA   Labs: WBC 14.10. Hgb 9.4 (11.9 on 6/21), , Na 141, K 4.7, BUN 56, Cr .7, lactate 2.9, UA negative       #Melena secondary to acute blood loss anemia secondary with history of concomitant meloxicam and prednisone use   -Meloxicam recently started outpt 15mg qd for hip/leg pain for Osteoarthritis  -Chronic prednisone use for lung issue? (started at 10mg years ago), tapering down in hospital. Will continue slow taper on discharge until stop (patient's pulmonologist onboard with plan)  hold nsaids  -PPI BID on discharge  -GI recommended EGD but daughter did not prefer doing an EGD unless patient was actively bleeding / unstable. She continued to have black stools during her stay here   -s/p 2 PRBC in total

## 2023-07-12 NOTE — DISCHARGE NOTE PROVIDER - PROVIDER TOKENS
PROVIDER:[TOKEN:[[id number]]] PROVIDER:[TOKEN:[[id number]]],PROVIDER:[TOKEN:[[id number]]] PROVIDER:[TOKEN:[[id number]],FOLLOWUP:[Routine]],PROVIDER:[TOKEN:[[id number]],FOLLOWUP:[1 week]]

## 2023-07-12 NOTE — DISCHARGE NOTE PROVIDER - DETAILS OF MALNUTRITION DIAGNOSIS/DIAGNOSES
This patient has been assessed with a concern for Malnutrition and was treated during this hospitalization for the following Nutrition diagnosis/diagnoses:     -  07/02/2023: Severe protein-calorie malnutrition

## 2023-07-12 NOTE — DISCHARGE NOTE PROVIDER - CARE PROVIDER_API CALL
Rebecca Martin  Gastroenterology  [address] [place] [address]  Phone: [phone]  Fax: [phone]  Follow Up Time:    Rebecca Martin  Gastroenterology  [address] [place] [address]  Phone: [phone]  Fax: [phone]  Follow Up Time:     Yulisa Taylor  [address] [address]  Phone: [phone]  Fax: [phone]  Follow Up Time:    Rebecca Martin  Gastroenterology  [address] [place] [address]  Phone: [phone]  Fax: [phone]  Follow Up Time: Yulisa Fuller  [facility]  [address] [place] [id number]  Phone: [phone]  Fax: [phone]  Follow Up Time: 1 week

## 2023-07-25 DIAGNOSIS — M19.90 UNSPECIFIED OSTEOARTHRITIS, UNSPECIFIED SITE: ICD-10-CM

## 2023-07-25 DIAGNOSIS — Z79.52 LONG TERM (CURRENT) USE OF SYSTEMIC STEROIDS: ICD-10-CM
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
## 2023-07-25 DIAGNOSIS — S32.10XA UNSPECIFIED FRACTURE OF SACRUM, INITIAL ENCOUNTER FOR CLOSED FRACTURE: ICD-10-CM

## 2023-07-25 DIAGNOSIS — E78.5 HYPERLIPIDEMIA, UNSPECIFIED: ICD-10-CM

## 2023-07-25 DIAGNOSIS — Z66 DO NOT RESUSCITATE: ICD-10-CM

## 2023-07-25 DIAGNOSIS — R74.01 ELEVATION OF LEVELS OF LIVER TRANSAMINASE LEVELS: ICD-10-CM

## 2023-07-25 DIAGNOSIS — F03.911 UNSPECIFIED DEMENTIA, UNSPECIFIED SEVERITY, WITH AGITATION: ICD-10-CM

## 2023-07-25 DIAGNOSIS — R53.1 WEAKNESS: ICD-10-CM

## 2023-07-25 DIAGNOSIS — M48.56XA COLLAPSED VERTEBRA, NOT ELSEWHERE CLASSIFIED, LUMBAR REGION, INITIAL ENCOUNTER FOR FRACTURE: ICD-10-CM

## 2023-07-25 DIAGNOSIS — D62 ACUTE POSTHEMORRHAGIC ANEMIA: ICD-10-CM

## 2023-07-25 DIAGNOSIS — I48.20 CHRONIC ATRIAL FIBRILLATION, UNSPECIFIED: ICD-10-CM

## 2023-07-25 DIAGNOSIS — F05 DELIRIUM DUE TO KNOWN PHYSIOLOGICAL CONDITION: ICD-10-CM

## 2023-07-25 DIAGNOSIS — E43 UNSPECIFIED SEVERE PROTEIN-CALORIE MALNUTRITION: ICD-10-CM

## 2023-07-25 DIAGNOSIS — I10 ESSENTIAL (PRIMARY) HYPERTENSION: ICD-10-CM

## 2023-07-25 DIAGNOSIS — E66.3 OVERWEIGHT: ICD-10-CM

## 2023-07-25 DIAGNOSIS — Z20.822 CONTACT WITH AND (SUSPECTED) EXPOSURE TO COVID-19: ICD-10-CM

## 2023-07-25 DIAGNOSIS — T38.0X5A ADVERSE EFFECT OF GLUCOCORTICOIDS AND SYNTHETIC ANALOGUES, INITIAL ENCOUNTER: ICD-10-CM

## 2023-07-25 DIAGNOSIS — J98.4 OTHER DISORDERS OF LUNG: ICD-10-CM

## 2023-07-25 DIAGNOSIS — S32.501A UNSPECIFIED FRACTURE OF RIGHT PUBIS, INITIAL ENCOUNTER FOR CLOSED FRACTURE: ICD-10-CM

## 2023-07-25 DIAGNOSIS — D72.829 ELEVATED WHITE BLOOD CELL COUNT, UNSPECIFIED: ICD-10-CM

## 2023-07-25 DIAGNOSIS — I71.43 INFRARENAL ABDOMINAL AORTIC ANEURYSM, WITHOUT RUPTURE: ICD-10-CM

## 2023-07-25 DIAGNOSIS — Z79.1 LONG TERM (CURRENT) USE OF NON-STEROIDAL ANTI-INFLAMMATORIES (NSAID): ICD-10-CM

## 2023-07-25 DIAGNOSIS — T39.395A ADVERSE EFFECT OF OTHER NONSTEROIDAL ANTI-INFLAMMATORY DRUGS [NSAID], INITIAL ENCOUNTER: ICD-10-CM
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
## 2023-07-25 DIAGNOSIS — H40.9 UNSPECIFIED GLAUCOMA: ICD-10-CM

## 2023-07-25 DIAGNOSIS — I25.10 ATHEROSCLEROTIC HEART DISEASE OF NATIVE CORONARY ARTERY WITHOUT ANGINA PECTORIS: ICD-10-CM

## 2023-07-25 DIAGNOSIS — K27.4 CHRONIC OR UNSPECIFIED PEPTIC ULCER, SITE UNSPECIFIED, WITH HEMORRHAGE: ICD-10-CM
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
## 2023-08-31 ENCOUNTER — OUTPATIENT (OUTPATIENT)
Dept: OUTPATIENT SERVICES | Facility: HOSPITAL | Age: 88
LOS: 1 days | End: 2023-08-31
Payer: MEDICARE

## 2023-08-31 ENCOUNTER — APPOINTMENT (OUTPATIENT)
Dept: GERIATRICS | Facility: CLINIC | Age: 88
End: 2023-08-31
Payer: MEDICARE

## 2023-08-31 VITALS
WEIGHT: 134 LBS | SYSTOLIC BLOOD PRESSURE: 128 MMHG | HEIGHT: 62 IN | TEMPERATURE: 96.8 F | OXYGEN SATURATION: 97 % | HEART RATE: 68 BPM | RESPIRATION RATE: 14 BRPM | DIASTOLIC BLOOD PRESSURE: 84 MMHG | BODY MASS INDEX: 24.66 KG/M2

## 2023-08-31 DIAGNOSIS — E78.00 PURE HYPERCHOLESTEROLEMIA, UNSPECIFIED: ICD-10-CM

## 2023-08-31 DIAGNOSIS — Z00.00 ENCOUNTER FOR GENERAL ADULT MEDICAL EXAMINATION WITHOUT ABNORMAL FINDINGS: ICD-10-CM

## 2023-08-31 PROCEDURE — 99204 OFFICE O/P NEW MOD 45 MIN: CPT

## 2023-08-31 RX ORDER — ASPIRIN 81 MG
81 TABLET, DELAYED RELEASE (ENTERIC COATED) ORAL DAILY
Refills: 0 | Status: DISCONTINUED | COMMUNITY
End: 2023-08-31

## 2023-08-31 RX ORDER — MELOXICAM 15 MG/1
15 TABLET ORAL
Qty: 30 | Refills: 2 | Status: DISCONTINUED | COMMUNITY
Start: 2023-05-16 | End: 2023-08-31

## 2023-08-31 RX ORDER — PREDNISONE 5 MG/1
5 TABLET ORAL DAILY
Qty: 30 | Refills: 1 | Status: DISCONTINUED | COMMUNITY
End: 2023-08-31

## 2023-08-31 RX ORDER — ATORVASTATIN CALCIUM 10 MG/1
10 TABLET, FILM COATED ORAL
Qty: 90 | Refills: 3 | Status: DISCONTINUED | COMMUNITY
Start: 1900-01-01 | End: 2023-08-31

## 2023-08-31 NOTE — END OF VISIT
[] : Resident [FreeTextEntry3] : SEEN [facility] TEAM Concur with above; taking a lot of meds, possibly contributing to pill dyspepsia; hard to interpret how patient is experiencing her discomforts Plan 1. stop sucralfate 2. decrease pantoprazole to q AM 3. add famotadine at night 4. stop atorvostatin 5. increase melatonin 5mg to 2 tabs at night - note that she was tried on Ambien and trazadone with personality changes from these meds - her insomnia is likely related to her dementia as she looks for companionship and security at night; also constantly asks to go t bathroom and often does not need to go Note also she requires assistance with all ADLs including being fed; daughter in need of expanded help with HHA hours.

## 2023-08-31 NOTE — HISTORY OF PRESENT ILLNESS
[Completely Dependent] : Completely dependent. [Full assistance needed] : Assistance needed managing medications [] : Assistance needed managing finances. [FreeTextEntry1] : Mrs. Canseco is a 89-year-old female here to establish care. Pmhx: unspecified dementia, aortic anyeryism, hip and back pain, high cholesterol, recent gi bleed, ?lung fibrosis  She follows cardiology, ortho, pain management and pulmonology  Daughter accompanied patient  daughter went over recent hospital course; admitted june 30th for melena, likely 2/2 prednisone/meloxicam use. s/p 2 unit prbc and multiple iron infusions.  currently on pantoprazole bid, sulcralfate qid  asking if pt needs to cotinue atrovastatin asking if pt needs to continue sucralfate

## 2023-08-31 NOTE — REVIEW OF SYSTEMS
[Abdominal Pain] : abdominal pain [Arthralgias] : arthralgias [Melena] : melharis [Joint Stiffness] : joint stiffness [Limb Pain] : limb pain [Negative] : Respiratory [Earache] : no earache [Nosebleeds] : no nosebleeds [Constipation] : no constipation [Diarrhea] : no diarrhea [Joint Swelling] : no joint swelling [Proptosis] : no proptosis [Hot Flashes] : no hot flashes [Deepening Of The Voice] : no deepening of the voice [Easy Bruising] : no tendency for easy bruising [Swollen Glands] : no swollen glands [FreeTextEntry7] : dark stools noted (also on iron) [de-identified] : pleasantly confused

## 2023-08-31 NOTE — PHYSICAL EXAM
[Alert] : alert [No Acute Distress] : in no acute distress [Sclera] : the sclera and conjunctiva were normal [Normal Outer Ear/Nose] : the ears and nose were normal in appearance [Normal Appearance] : the appearance of the neck was normal [Supple] : the neck was supple [No Respiratory Distress] : no respiratory distress [Auscultation Breath Sounds / Voice Sounds] : lungs were clear to auscultation bilaterally [Normal S1, S2] : normal S1 and S2 [Heart Rate And Rhythm] : heart rate was normal and rhythm regular [Bowel Sounds] : normal bowel sounds [Abdomen Tenderness] : non-tender [Abdomen Soft] : soft [No Clubbing, Cyanosis] : no clubbing or cyanosis of the fingernails [Normal Color / Pigmentation] : normal skin color and pigmentation [de-identified] : mild b/l edema [de-identified] : gait not assessed, wheelchair uses walker as well [de-identified] : dementia

## 2023-08-31 NOTE — ASSESSMENT
[FreeTextEntry1] : 89 year old female with dementia and recent gi bleed here to establish care.  # recent gi bleed # iron deficiency anemia - stop sucralfate - decrease ppi to pantoprazole 40mg PO in AM once daily - start famotidine 40mg at night - continue iron supplements - iron profile today - cbc q1 month for 3 months - has gi follow up in September  # R hip pain # trochanteric bursitis - follows pain management - added lidocaine patch  # ?lung fibrosis - follows pulmonology  # hyperlipidemia - advised to stop taking atorvastatin - lipid proifle wnl  # HTN - continue metoprolol 25 succinate daily  # insomnia # hx of dementia - avoid trazadone, ambien - increase melatonin to 10mg qHS  # aortic aneyurism  - follows cardiology - stable ~4.2 cm - continue betablocker  HCM - follow up in 3 months - will discuss routine vaccinations at next visit NOTE ALSO THAT THE PATIENT IS DEPENDENT IN ALL ADLS; SHE NEEDS TO BE FED, DRESSED, TOILETED ETC; DAUGHTER IS WORKING AND PROVIDING ALL CARE FROM TIME JSHE COMES HOME AND OVERNIGHT WITH INCREASED NEEDS FOR PATIENT UP ALL NIGHT; WOULD BE APPRORIATE TO SECURE MORE HOURS FOR A

## 2023-09-01 ENCOUNTER — OUTPATIENT (OUTPATIENT)
Dept: OUTPATIENT SERVICES | Facility: HOSPITAL | Age: 88
LOS: 1 days | End: 2023-09-01
Payer: MEDICARE

## 2023-09-01 DIAGNOSIS — Z00.00 ENCOUNTER FOR GENERAL ADULT MEDICAL EXAMINATION W/OUT ABNORMAL FINDINGS: ICD-10-CM

## 2023-09-01 PROCEDURE — 83036 HEMOGLOBIN GLYCOSYLATED A1C: CPT

## 2023-09-01 PROCEDURE — 80053 COMPREHEN METABOLIC PANEL: CPT

## 2023-09-01 PROCEDURE — 36415 COLL VENOUS BLD VENIPUNCTURE: CPT

## 2023-09-01 PROCEDURE — 85027 COMPLETE CBC AUTOMATED: CPT

## 2023-09-01 PROCEDURE — 82728 ASSAY OF FERRITIN: CPT

## 2023-09-01 PROCEDURE — 83550 IRON BINDING TEST: CPT

## 2023-09-01 PROCEDURE — 83540 ASSAY OF IRON: CPT

## 2023-09-05 DIAGNOSIS — I10 ESSENTIAL (PRIMARY) HYPERTENSION: ICD-10-CM

## 2023-09-05 DIAGNOSIS — M70.61 TROCHANTERIC BURSITIS, RIGHT HIP: ICD-10-CM

## 2023-09-05 DIAGNOSIS — E61.1 IRON DEFICIENCY: ICD-10-CM

## 2023-09-05 DIAGNOSIS — G47.00 INSOMNIA, UNSPECIFIED: ICD-10-CM

## 2023-09-05 DIAGNOSIS — K92.2 GASTROINTESTINAL HEMORRHAGE, UNSPECIFIED: ICD-10-CM

## 2023-09-05 DIAGNOSIS — E78.00 PURE HYPERCHOLESTEROLEMIA, UNSPECIFIED: ICD-10-CM

## 2023-09-05 DIAGNOSIS — I77.810 THORACIC AORTIC ECTASIA: ICD-10-CM

## 2023-09-18 ENCOUNTER — APPOINTMENT (OUTPATIENT)
Dept: PAIN MANAGEMENT | Facility: CLINIC | Age: 88
End: 2023-09-18
Payer: MEDICARE

## 2023-09-18 VITALS — WEIGHT: 134 LBS | BODY MASS INDEX: 24.66 KG/M2 | HEIGHT: 62 IN

## 2023-09-18 DIAGNOSIS — M25.561 PAIN IN RIGHT KNEE: ICD-10-CM

## 2023-09-18 DIAGNOSIS — M25.562 PAIN IN RIGHT KNEE: ICD-10-CM

## 2023-09-18 DIAGNOSIS — M47.814 SPONDYLOSIS W/OUT MYELOPATHY OR RADICULOPATHY, THORACIC REGION: ICD-10-CM

## 2023-09-18 PROCEDURE — 99214 OFFICE O/P EST MOD 30 MIN: CPT

## 2023-09-21 LAB
ALBUMIN SERPL ELPH-MCNC: 3.9 G/DL
ALP BLD-CCNC: 70 U/L
ALT SERPL-CCNC: 9 U/L
ANION GAP SERPL CALC-SCNC: 11 MMOL/L
AST SERPL-CCNC: 14 U/L
BILIRUB SERPL-MCNC: 0.6 MG/DL
BUN SERPL-MCNC: 15 MG/DL
CALCIUM SERPL-MCNC: 9.4 MG/DL
CHLORIDE SERPL-SCNC: 108 MMOL/L
CO2 SERPL-SCNC: 24 MMOL/L
CREAT SERPL-MCNC: 1.04 MG/DL
EGFR: 50 ML/MIN/1.73M2
ESTIMATED AVERAGE GLUCOSE: 111 MG/DL
FERRITIN SERPL-MCNC: 127 NG/ML
GLUCOSE SERPL-MCNC: 110 MG/DL
HBA1C MFR BLD HPLC: 5.5 %
HCT VFR BLD CALC: 39.5 %
HGB BLD-MCNC: 11.7 G/DL
IRON SATN MFR SERPL: 31 %
IRON SERPL-MCNC: 80 UG/DL
MCHC RBC-ENTMCNC: 28.5 PG
MCHC RBC-ENTMCNC: 29.6 GM/DL
MCV RBC AUTO: 96.3 FL
PLATELET # BLD AUTO: 173 K/UL
POTASSIUM SERPL-SCNC: 4.3 MMOL/L
PROT SERPL-MCNC: 5.9 G/DL
RBC # BLD: 4.1 M/UL
RBC # FLD: 15.9 %
SODIUM SERPL-SCNC: 143 MMOL/L
TIBC SERPL-MCNC: 256 UG/DL
UIBC SERPL-MCNC: 177 UG/DL
WBC # FLD AUTO: 4.78 K/UL

## 2023-10-02 ENCOUNTER — OUTPATIENT (OUTPATIENT)
Dept: OUTPATIENT SERVICES | Facility: HOSPITAL | Age: 88
LOS: 1 days | End: 2023-10-02
Payer: MEDICARE

## 2023-10-02 DIAGNOSIS — Z00.00 ENCOUNTER FOR GENERAL ADULT MEDICAL EXAMINATION WITHOUT ABNORMAL FINDINGS: ICD-10-CM

## 2023-10-02 PROCEDURE — 85027 COMPLETE CBC AUTOMATED: CPT

## 2023-10-03 LAB
HCT VFR BLD CALC: 41.1 %
HGB BLD-MCNC: 12.4 G/DL
MCHC RBC-ENTMCNC: 27.7 PG
MCHC RBC-ENTMCNC: 30.2 G/DL
MCV RBC AUTO: 91.9 FL
PLATELET # BLD AUTO: 162 K/UL
PMV BLD: 10.9 FL
RBC # BLD: 4.47 M/UL
RBC # FLD: 15 %
WBC # FLD AUTO: 5.2 K/UL

## 2023-10-10 ENCOUNTER — OUTPATIENT (OUTPATIENT)
Dept: OUTPATIENT SERVICES | Facility: HOSPITAL | Age: 88
LOS: 1 days | End: 2023-10-10
Payer: MEDICARE

## 2023-10-10 DIAGNOSIS — Z00.8 ENCOUNTER FOR OTHER GENERAL EXAMINATION: ICD-10-CM

## 2023-10-10 DIAGNOSIS — R13.10 DYSPHAGIA, UNSPECIFIED: ICD-10-CM

## 2023-10-10 PROCEDURE — 74240 X-RAY XM UPR GI TRC 1CNTRST: CPT | Mod: 26

## 2023-10-10 PROCEDURE — 74240 X-RAY XM UPR GI TRC 1CNTRST: CPT

## 2023-10-11 DIAGNOSIS — R13.10 DYSPHAGIA, UNSPECIFIED: ICD-10-CM

## 2023-10-16 ENCOUNTER — APPOINTMENT (OUTPATIENT)
Dept: GASTROENTEROLOGY | Facility: CLINIC | Age: 88
End: 2023-10-16

## 2023-10-26 DIAGNOSIS — Z00.00 ENCOUNTER FOR GENERAL ADULT MEDICAL EXAMINATION WITHOUT ABNORMAL FINDINGS: ICD-10-CM

## 2023-10-27 DIAGNOSIS — Z00.00 ENCOUNTER FOR GENERAL ADULT MEDICAL EXAMINATION WITHOUT ABNORMAL FINDINGS: ICD-10-CM

## 2023-10-30 ENCOUNTER — APPOINTMENT (OUTPATIENT)
Dept: PAIN MANAGEMENT | Facility: CLINIC | Age: 88
End: 2023-10-30

## 2023-11-03 ENCOUNTER — OUTPATIENT (OUTPATIENT)
Dept: OUTPATIENT SERVICES | Facility: HOSPITAL | Age: 88
LOS: 1 days | End: 2023-11-03
Payer: MEDICARE

## 2023-11-03 PROCEDURE — 85027 COMPLETE CBC AUTOMATED: CPT

## 2023-11-03 PROCEDURE — 80053 COMPREHEN METABOLIC PANEL: CPT

## 2023-11-03 PROCEDURE — 36415 COLL VENOUS BLD VENIPUNCTURE: CPT

## 2023-11-03 PROCEDURE — 83550 IRON BINDING TEST: CPT

## 2023-11-03 PROCEDURE — 82728 ASSAY OF FERRITIN: CPT

## 2023-11-03 PROCEDURE — 83540 ASSAY OF IRON: CPT

## 2023-11-03 PROCEDURE — 83036 HEMOGLOBIN GLYCOSYLATED A1C: CPT

## 2023-11-09 LAB
ALBUMIN SERPL ELPH-MCNC: 3.9 G/DL
ALP BLD-CCNC: 71 U/L
ALT SERPL-CCNC: 18 U/L
ANION GAP SERPL CALC-SCNC: 12 MMOL/L
AST SERPL-CCNC: 25 U/L
BASOPHILS # BLD AUTO: 0.06 K/UL
BASOPHILS NFR BLD AUTO: 1.1 %
BILIRUB SERPL-MCNC: 0.5 MG/DL
BUN SERPL-MCNC: 21 MG/DL
CALCIUM SERPL-MCNC: 9.6 MG/DL
CHLORIDE SERPL-SCNC: 106 MMOL/L
CO2 SERPL-SCNC: 24 MMOL/L
CREAT SERPL-MCNC: 1 MG/DL
EGFR: 52 ML/MIN/1.73M2
EOSINOPHIL # BLD AUTO: 0.25 K/UL
EOSINOPHIL NFR BLD AUTO: 4.4 %
ESTIMATED AVERAGE GLUCOSE: 123 MG/DL
FERRITIN SERPL-MCNC: 83 NG/ML
GLUCOSE SERPL-MCNC: 127 MG/DL
HBA1C MFR BLD HPLC: 5.9 %
HCT VFR BLD CALC: 45.3 %
HGB BLD-MCNC: 14.2 G/DL
IMM GRANULOCYTES NFR BLD AUTO: 0.2 %
IRON SATN MFR SERPL: 48 %
IRON SERPL-MCNC: 122 UG/DL
LYMPHOCYTES # BLD AUTO: 1.83 K/UL
LYMPHOCYTES NFR BLD AUTO: 32.1 %
MAN DIFF?: NORMAL
MCHC RBC-ENTMCNC: 28.6 PG
MCHC RBC-ENTMCNC: 31.3 GM/DL
MCV RBC AUTO: 91.3 FL
MONOCYTES # BLD AUTO: 0.65 K/UL
MONOCYTES NFR BLD AUTO: 11.4 %
NEUTROPHILS # BLD AUTO: 2.9 K/UL
NEUTROPHILS NFR BLD AUTO: 50.8 %
PLATELET # BLD AUTO: 165 K/UL
POTASSIUM SERPL-SCNC: 4.6 MMOL/L
PROT SERPL-MCNC: 6.3 G/DL
RBC # BLD: 4.96 M/UL
RBC # FLD: 15.7 %
SODIUM SERPL-SCNC: 141 MMOL/L
TIBC SERPL-MCNC: 254 UG/DL
UIBC SERPL-MCNC: 132 UG/DL
WBC # FLD AUTO: 5.7 K/UL

## 2023-11-28 DIAGNOSIS — Z00.00 ENCOUNTER FOR GENERAL ADULT MEDICAL EXAMINATION WITHOUT ABNORMAL FINDINGS: ICD-10-CM

## 2023-11-29 DIAGNOSIS — Z00.00 ENCOUNTER FOR GENERAL ADULT MEDICAL EXAMINATION WITHOUT ABNORMAL FINDINGS: ICD-10-CM

## 2023-12-05 ENCOUNTER — APPOINTMENT (OUTPATIENT)
Dept: ORTHOPEDIC SURGERY | Facility: CLINIC | Age: 88
End: 2023-12-05

## 2023-12-07 ENCOUNTER — APPOINTMENT (OUTPATIENT)
Dept: GERIATRICS | Facility: CLINIC | Age: 88
End: 2023-12-07
Payer: MEDICARE

## 2023-12-07 ENCOUNTER — OUTPATIENT (OUTPATIENT)
Dept: OUTPATIENT SERVICES | Facility: HOSPITAL | Age: 88
LOS: 1 days | End: 2023-12-07
Payer: MEDICARE

## 2023-12-07 VITALS
DIASTOLIC BLOOD PRESSURE: 81 MMHG | WEIGHT: 129 LBS | SYSTOLIC BLOOD PRESSURE: 152 MMHG | BODY MASS INDEX: 23.74 KG/M2 | TEMPERATURE: 98.3 F | HEIGHT: 62 IN | HEART RATE: 56 BPM | OXYGEN SATURATION: 96 %

## 2023-12-07 DIAGNOSIS — J98.4 OTHER DISORDERS OF LUNG: ICD-10-CM
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
## 2023-12-07 DIAGNOSIS — Z86.39 PERSONAL HISTORY OF OTHER ENDOCRINE, NUTRITIONAL AND METABOLIC DISEASE: ICD-10-CM

## 2023-12-07 DIAGNOSIS — Z23 ENCOUNTER FOR IMMUNIZATION: ICD-10-CM

## 2023-12-07 DIAGNOSIS — B02.23 POSTHERPETIC POLYNEUROPATHY: ICD-10-CM

## 2023-12-07 DIAGNOSIS — I77.810 THORACIC AORTIC ECTASIA: ICD-10-CM

## 2023-12-07 DIAGNOSIS — R60.0 LOCALIZED EDEMA: ICD-10-CM

## 2023-12-07 DIAGNOSIS — M70.61 TROCHANTERIC BURSITIS, RIGHT HIP: ICD-10-CM

## 2023-12-07 DIAGNOSIS — E55.9 VITAMIN D DEFICIENCY, UNSPECIFIED: ICD-10-CM

## 2023-12-07 DIAGNOSIS — Z00.00 ENCOUNTER FOR GENERAL ADULT MEDICAL EXAMINATION WITHOUT ABNORMAL FINDINGS: ICD-10-CM

## 2023-12-07 PROCEDURE — 99214 OFFICE O/P EST MOD 30 MIN: CPT

## 2023-12-07 PROCEDURE — 99214 OFFICE O/P EST MOD 30 MIN: CPT | Mod: 25

## 2023-12-07 PROCEDURE — 90471 IMMUNIZATION ADMIN: CPT

## 2023-12-07 PROCEDURE — 90662 IIV NO PRSV INCREASED AG IM: CPT

## 2023-12-07 RX ORDER — TRAMADOL HYDROCHLORIDE 50 MG/1
50 TABLET, COATED ORAL
Qty: 14 | Refills: 0 | Status: COMPLETED | COMMUNITY
Start: 2023-06-07 | End: 2023-12-07

## 2023-12-07 RX ORDER — LIDOCAINE 40 MG/G
4 PATCH TOPICAL
Qty: 30 | Refills: 0 | Status: COMPLETED | COMMUNITY
Start: 2023-08-31 | End: 2023-12-07

## 2023-12-07 RX ORDER — LIDOCAINE 5% 700 MG/1
5 PATCH TOPICAL
Qty: 1 | Refills: 3 | Status: COMPLETED | COMMUNITY
Start: 2023-11-15 | End: 2023-12-07

## 2023-12-07 RX ORDER — METOPROLOL SUCCINATE 25 MG/1
25 TABLET, EXTENDED RELEASE ORAL DAILY
Qty: 90 | Refills: 3 | Status: ACTIVE | COMMUNITY
Start: 2022-05-18 | End: 1900-01-01

## 2023-12-07 RX ORDER — DICLOFENAC SODIUM 20 MG/G
2 SOLUTION TOPICAL
Qty: 1 | Refills: 2 | Status: COMPLETED | COMMUNITY
Start: 2023-04-20 | End: 2023-12-07

## 2023-12-07 RX ORDER — SENNOSIDES 8.6 MG TABLETS 8.6 MG/1
8.6 TABLET ORAL
Qty: 60 | Refills: 2 | Status: ACTIVE | COMMUNITY
Start: 2023-12-07 | End: 1900-01-01

## 2023-12-07 RX ORDER — PANTOPRAZOLE 40 MG/1
40 TABLET, DELAYED RELEASE ORAL DAILY
Qty: 30 | Refills: 3 | Status: COMPLETED | COMMUNITY
Start: 2023-08-31 | End: 2023-12-07

## 2023-12-07 RX ORDER — FAMOTIDINE 40 MG/1
40 TABLET, FILM COATED ORAL
Qty: 30 | Refills: 3 | Status: COMPLETED | COMMUNITY
Start: 2023-08-31 | End: 2023-12-07

## 2023-12-07 RX ORDER — CHROMIUM 200 MCG
25 MCG TABLET ORAL
Qty: 30 | Refills: 5 | Status: ACTIVE | COMMUNITY
Start: 2023-12-07 | End: 1900-01-01

## 2023-12-07 RX ORDER — OMEGA-3/DHA/EPA/FISH OIL 35-113.5MG
1000 TABLET,CHEWABLE ORAL
Qty: 90 | Refills: 0 | Status: COMPLETED | COMMUNITY
Start: 2022-08-19 | End: 2023-12-07

## 2023-12-07 RX ORDER — GABAPENTIN 300 MG/1
300 CAPSULE ORAL
Qty: 30 | Refills: 1 | Status: COMPLETED | COMMUNITY
Start: 2023-11-15 | End: 2023-12-07

## 2023-12-12 DIAGNOSIS — M70.61 TROCHANTERIC BURSITIS, RIGHT HIP: ICD-10-CM

## 2023-12-12 DIAGNOSIS — K59.09 OTHER CONSTIPATION: ICD-10-CM

## 2023-12-12 DIAGNOSIS — I77.810 THORACIC AORTIC ECTASIA: ICD-10-CM

## 2023-12-12 DIAGNOSIS — K92.2 GASTROINTESTINAL HEMORRHAGE, UNSPECIFIED: ICD-10-CM

## 2023-12-12 DIAGNOSIS — R60.0 LOCALIZED EDEMA: ICD-10-CM

## 2023-12-12 DIAGNOSIS — E55.9 VITAMIN D DEFICIENCY, UNSPECIFIED: ICD-10-CM

## 2023-12-12 DIAGNOSIS — B02.23 POSTHERPETIC POLYNEUROPATHY: ICD-10-CM

## 2023-12-12 DIAGNOSIS — E61.1 IRON DEFICIENCY: ICD-10-CM

## 2023-12-12 DIAGNOSIS — I10 ESSENTIAL (PRIMARY) HYPERTENSION: ICD-10-CM

## 2023-12-22 DIAGNOSIS — Z00.00 ENCOUNTER FOR GENERAL ADULT MEDICAL EXAMINATION WITHOUT ABNORMAL FINDINGS: ICD-10-CM

## 2023-12-22 NOTE — REVIEW OF SYSTEMS
[Earache] : no earache [Nosebleeds] : no nosebleeds [Constipation] : no constipation [Diarrhea] : no diarrhea [Joint Swelling] : no joint swelling [Proptosis] : no proptosis [Hot Flashes] : no hot flashes [Deepening Of The Voice] : no deepening of the voice [Easy Bruising] : no tendency for easy bruising [Swollen Glands] : no swollen glands [FreeTextEntry7] : dark stools noted (also on iron) [de-identified] : pleasantly confused

## 2023-12-22 NOTE — REVIEW OF SYSTEMS
[Earache] : no earache [Nosebleeds] : no nosebleeds [Constipation] : no constipation [Diarrhea] : no diarrhea [Joint Swelling] : no joint swelling [Proptosis] : no proptosis [Hot Flashes] : no hot flashes [Deepening Of The Voice] : no deepening of the voice [Easy Bruising] : no tendency for easy bruising [Swollen Glands] : no swollen glands [FreeTextEntry7] : dark stools noted (also on iron) [de-identified] : pleasantly confused

## 2023-12-22 NOTE — END OF VISIT
[] : Resident [FreeTextEntry3] : SEEN WITH [facility] TEAM Pursuit of palliative approach with patient; no further assessments of AAA; continuing meds; will repeat labs in new year; meds renewed; excellent familial support.

## 2023-12-22 NOTE — ASSESSMENT
[FreeTextEntry1] : 89 year old female with dementia and recent gi bleed here for f/u.  # recent fall 12/1/23 - b/l knee ecchymosis w/o pain - no HT, LoC, AC - unwitnessed - ambulates well - orthostatics -ve 123/88 stitting -> 141/84 standing - requesting bed w/ rails  # recent gi bleed # iron deficiency anemia # Hx steriod use, meloxicam, & H pylori - stop sucralfate - decreased ppi to pantoprazole 40mg PO in AM once daily - stop iron supplements - iron profile -> wnl - Hb 14 - has gi follow up -> H pylori breath test +ve -> s/p metronidazole + PPI only as per daughter -> repeat breath test -ve as per daughter  # R hip pain, inactive # trochanteric bursitis - follows pain management - added lidocaine patch, insurance not covering -> not in pain now - Tylenol PRN  # ?lung fibrosis - follows pulmonology - refer Pulm here - previous hx steroid use  # hyperlipidemia - previously advised to stop taking atorvastatin  # HTN - continue metoprolol 25 succinate daily  # insomnia # hx of dementia (Alert, intermittently oriented to Person, time, place) # DNI/DNR/ no feeding tubes - avoid trazadoneMikaien - Previously increased melatonin to 10mg QHS  # aortic aneurism - no plan for intervention - follows cardiology - stable ~4.3 cm on TTE, 4.7 on CT - continue betablocker - Target BP < 120/80 - No need to do surveillance given dementia and no plan for surgeries  # Zoster right UE - crusted fallen off lesions - no pain  # vitamin D deficiency? - check vitamin D  # pre-DM - repeat a1c  # Constipation, resolved - c/w senna - BM 1-2/day  HCM - follow up in 3 months - flu vaccine today 12/7/23 - daughter will think about TdaP - daughter will check with older PCP about PCV20 (previously got pneumonia vaccine) - s/p covid vaccine x 3 - HCV screening - stop vitamin B12 , level wnl in Jan 2023

## 2023-12-22 NOTE — PHYSICAL EXAM
[de-identified] : soft crackles [de-identified] : gait not assessed, wheelchair uses walker as well [de-identified] : dementia [Sclera] : the sclera and conjunctiva were normal [Alert] : alert [EOMI] : extraocular movements were intact [Normal S1, S2] : normal S1 and S2 [Heart Rate And Rhythm] : heart rate was normal and rhythm regular [Carotids Normal] : carotid pulses were normal with no bruits [Bowel Sounds] : normal bowel sounds [Abdomen Tenderness] : non-tender [Normal Color / Pigmentation] : normal skin color and pigmentation

## 2023-12-22 NOTE — PHYSICAL EXAM
[de-identified] : soft crackles [de-identified] : gait not assessed, wheelchair uses walker as well [de-identified] : dementia [Alert] : alert [Sclera] : the sclera and conjunctiva were normal [EOMI] : extraocular movements were intact [Normal S1, S2] : normal S1 and S2 [Heart Rate And Rhythm] : heart rate was normal and rhythm regular [Carotids Normal] : carotid pulses were normal with no bruits [Bowel Sounds] : normal bowel sounds [Abdomen Tenderness] : non-tender [Normal Color / Pigmentation] : normal skin color and pigmentation

## 2023-12-22 NOTE — HISTORY OF PRESENT ILLNESS
[FreeTextEntry1] : 89-year-old female w/ PMHx of Dementia, aortic aneurysm, HDL, GI bleed, lung fibrosis presents to geriatric clinic for follow up.   Daughter accompanied patient  daughter went over recent hospital course; admitted june 30th for melena, likely 2/2 prednisone/meloxicam use. s/p 2 unit prbc and multiple iron infusions. currently on pantoprazole and sucralfate

## 2024-02-18 PROBLEM — M47.816 ARTHRITIS OF LUMBAR SPINE: Status: ACTIVE | Noted: 2023-09-18

## 2024-02-18 PROBLEM — I10 BENIGN ESSENTIAL HYPERTENSION: Status: ACTIVE | Noted: 2022-05-18

## 2024-02-19 NOTE — OCCUPATIONAL THERAPY INITIAL EVALUATION ADULT - SITTING BALANCE: DYNAMIC
"          MY TREATMENT INFORMATION FOR SLEEP APNEA-  Timothy Thapa    DOCTOR : SABRINA Marcum CNP    Am I having a sleep study at a sleep center?  --->Due to normal delays, you will be contacted within 2-4 weeks to schedule    Am I having a home sleep study?  --->Watch the video for the device you are using:    -/drop off device-   https://www.MedPro.com/watch?v=yGGFBdELGhk    -Disposable device sent out require phone/computer application-   https://www.MedPro.com/watch?v=BCce_vbiwxE      Frequently asked questions:  1. What is Obstructive Sleep Apnea (MIKE)? MIKE is the most common type of sleep apnea. Apnea means, \"without breath.\"  Apnea is most often caused by narrowing or collapse of the upper airway as muscles relax during sleep.   Almost everyone has occasional apneas. Most people with sleep apnea have had brief interruptions at night frequently for many years.  The severity of sleep apnea is related to how frequent and severe the events are.   2. What are the consequences of MIKE? Symptoms include: feeling sleepy during the day, snoring loudly, gasping or stopping of breathing, trouble sleeping, and occasionally morning headaches or heartburn at night.  Sleepiness can be serious and even increase the risk of falling asleep while driving. Other health consequences may include development of high blood pressure and other cardiovascular disease in persons who are susceptible. Untreated MIKE  can contribute to heart disease, stroke and diabetes.   3. What are the treatment options? In most situations, sleep apnea is a lifelong disease that must be managed with daily therapy. Medications are not effective for sleep apnea and surgery is generally not considered until other therapies have been tried. Your treatment is your choice . Continuous Positive Airway (CPAP) works right away and is the therapy that is effective in nearly everyone. An oral device to hold your jaw forward is usually the next most " reliable option. Other options include postioning devices (to keep you off your back), weight loss, and surgery including a tongue pacing device. There is more detail about some of these options below.  4. Are my sleep studies covered by insurance? Although we will request verification of coverage, we advise you also check in advance of the study to ensure there is coverage.    Important tips for those choosing CPAP and similar devices  For new devices, sign up for device INGRID to monitor your device for your followup visits  We encourage you to utilize the [employer] ingrid or website (myAir web (resmed.com) ) to monitor your therapy progress and share the data with your healthcare team when you discuss your sleep apnea.                                                    Know your equipment:  CPAP is continuous positive airway pressure that prevents obstructive sleep apnea by keeping the throat from collapsing while you are sleeping. In most cases, the device is  smart  and can slowly self-adjusts if your throat collapses and keeps a record every day of how well you are treated-this information is available to you and your care team.  BPAP is bilevel positive airway pressure that keeps your throat open and also assists each breath with a pressure boost to maintain adequate breathing.  Special kinds of BPAP are used in patients who have inadequate breathing from lung or heart disease. In most cases, the device is  smart  and can slowly self-adjusts to assist breathing. Like CPAP, the device keeps a record of how well you are treated.  Your mask is your connection to the device. You get to choose what feels most comfortable and the staff will help to make sure if fits. Here: are some examples of the different masks that are available: Magnetic mask aids may assist with use but there are safety issues that should be addressed when considering with magnets* ( see end of discussion).       Key points to remember on your  journey with sleep apnea:  Sleep study.  PAP devices often need to be adjusted during a sleep study to show that they are effective and adjusted right.  Good tips to remember: Try wearing just the mask during a quiet time during the day so your body adapts to wearing it. A humidifier is recommended for comfort in most cases to prevent drying of your nose and throat. Allergy medication from your provider may help you if you are having nasal congestion.  Getting settled-in. It takes more than one night for most of us to get used to wearing a mask. Try wearing just the mask during a quiet time during the day so your body adapts to wearing it. A humidifier is recommended for comfort in most cases. Our team will work with you carefully on the first day and will be in contact within 4 days and again at 2 and 4 weeks for advice and remote device adjustments. Your therapy is evaluated by the device each day.   Use it every night. The more you are able to sleep naturally for 7-8 hours, the more likely you will have good sleep and to prevent health risks or symptoms from sleep apnea. Even if you use it 4 hours it helps. Occasionally all of us are unable to use a medical therapy, in sleep apnea, it is not dangerous to miss one night.   Communicate. Call our skilled team on the number provided on the first day if your visit for problems that make it difficult to wear the device. Over 2 out of 3 patients can learn to wear the device long-term with help from our team. Remember to call our team or your sleep providers if you are unable to wear the device as we may have other solutions for those who cannot adapt to mask CPAP therapy. It is recommended that you sleep your sleep provider within the first 3 months and yearly after that if you are not having problems.   Use it for your health. We encourage use of CPAP masks during daytime quiet periods to allow your face and brain to adapt to the sensation of CPAP so that it will be a  more natural sensation to awaken to at night or during naps. This can be very useful during the first few weeks or months of adapting to CPAP though it does not help medically to wear CPAP during wakefulness and  should not be used as a strategy just to meet guidelines.  Take care of your equipment. Make sure you clean your mask and tubing using directions every day and that your filter and mask are replaced as recommended or if they are not working.     *Masks with magnets:  Updated Contraindications  Masks with magnetic components are contraindicated for use by patients where they, or anyone in close physical contact while using the mask, have the following:   Active medical implants that interact with magnets (i.e., pacemakers, implantable cardioverter defibrillators (ICD), neurostimulators, cerebrospinal fluid (CSF) shunts, insulin/infusion pumps)   Metallic implants/objects containing ferromagnetic material (i.e., aneurysm clips/flow disruption devices, embolic coils, stents, valves, electrodes, implants to restore hearing or balance with implanted magnets, ocular implants, metallic splinters in the eye)  Updated Warning  Keep the mask magnets at a safe distance of at least 6 inches (150 mm) away from implants or medical devices that may be adversely affected by magnetic interference. This warning applies to you or anyone in close physical contact with your mask. The magnets are in the frame and lower headgear clips, with a magnetic field strength of up to 400mT. When worn, they connect to secure the mask but may inadvertently detach while asleep.  Implants/medical devices, including those listed within contraindications, may be adversely affected if they change function under external magnetic fields or contain ferromagnetic materials that attract/repel to magnetic fields (some metallic implants, e.g., contact lenses with metal, dental implants, metallic cranial plates, screws, michelle hole covers, and bone  substitute devices). Consult your physician and  of your implant / other medical device for information on the potential adverse effects of magnetic fields.    BESIDES CPAP, WHAT OTHER THERAPIES ARE THERE?    Positioning Device  Positioning devices are generally used when sleep apnea is mild and only occurs on your back.This example shows a pillow that straps around the waist. It may be appropriate for those whose sleep study shows milder sleep apnea that occurs primarily when lying flat on one's back. Preliminary studies have shown benefit but effectiveness at home may need to be verified by a home sleep test. These devices are generally not covered by medical insurance.  Examples of devices that maintain sleeping on the back to prevent snoring and mild sleep apnea.    Belt type body positioner  http://[employer]/    Electronic reminder  http://nightshifttherapy.com/            Oral Appliance  What is oral appliance therapy?  An oral appliance device fits on your teeth at night like a retainer used after having braces. The device is made by a specialized dentist and requires several visits over 1-2 months before a manufactured device is made to fit your teeth and is adjusted to prevent your sleep apnea. Once an oral device is working properly, snoring should be improved. A home sleep test may be recommended at that time if to determine whether the sleep apnea is adequately treated.       Some things to remember:  -Oral devices are often, but not always, covered by your medical insurance. Be sure to check with your insurance provider.   -If you are referred for oral therapy, you will be given a list of specialized dentists to consider or you may choose to visit the Web site of the American Academy of Dental Sleep Medicine  -Oral devices are less likely to work if you have severe sleep apnea or are extremely overweight.     More detailed information  An oral appliance is a small acrylic device that fits  over the upper and lower teeth  (similar to a retainer or a mouth guard). This device slightly moves jaw forward, which moves the base of the tongue forward, opens the airway, improves breathing for effective treat snoring and obstructive sleep apnea in perhaps 7 out of 10 people .  The best working devices are custom-made by a dental device  after a mold is made of the teeth 1, 2, 3.  When is an oral appliance indicated?  Oral appliance therapy is recommended as a first-line treatment for patients with primary snoring, mild sleep apnea, and for patients with moderate sleep apnea who prefer appliance therapy to use of CPAP4, 5. Severity of sleep apnea is determined by sleep testing and is based on the number of respiratory events per hour of sleep.   How successful is oral appliance therapy?  The success rate of oral appliance therapy in patients with mild sleep apnea is 75-80% while in patients with moderate sleep apnea it is 50-70%. The chance of success in patients with severe sleep apnea is 40-50%. The research also shows that oral appliances have a beneficial effect on the cardiovascular health of MIKE patients at the same magnitude as CPAP therapy7.  Oral appliances should be a second-line treatment in cases of severe sleep apnea, but if not completely successful then a combination therapy utilizing CPAP plus oral appliance therapy may be effective. Oral appliances tend to be effective in a broad range of patients although studies show that the patients who have the highest success are females, younger patients, those with milder disease, and less severe obesity. 3, 6.   Finding a dentist that practices dental sleep medicine  Specific training is available through the American Academy of Dental Sleep Medicine for dentists interested in working in the field of sleep. To find a dentist who is educated in the field of sleep and the use of oral appliances, near you, visit the Web site of the American  Academy of Dental Sleep Medicine.    References  1. Mehul et al. Objectively measured vs self-reported compliance during oral appliance therapy for sleep-disordered breathing. Chest 2013; 144(5): 8885-4866.  2. Suni et al. Objective measurement of compliance during oral appliance therapy for sleep-disordered breathing. Thorax 2013; 68(1): 91-96.  3. Juan, et al. Mandibular advancement devices in 620 men and women with MIKE and snoring: tolerability and predictors of treatment success. Chest 2004; 125: 2561-0960.  4. Aleja et al. Oral appliances for snoring and MIKE: a review. Sleep 2006; 29: 244-262.  5. Fani et al. Oral appliance treatment for MIKE: an update. J Clin Sleep Med 2014; 10(2): 215-227.  6. Usman et al. Predictors of OSAH treatment outcome. J Dent Res 2007; 86: 0526-6333.      Weight Loss:    Weight loss is a long-term strategy that may improve sleep apnea in some patients.    Weight management is a personal decision and the decision should be based on your interest and the potential benefits.  If you are interested in exploring weight loss strategies, the following discussion covers the impact on weight loss on sleep apnea and the approaches that may be successful.    Being overweight does not necessarily mean you will have health consequences.  Those who have BMI over 35 or over 27 with existing medical conditions carries greater risk.   Weight loss decreases severity of sleep apnea in most people with obesity. For those with mild obesity who have developed snoring with weight gain, even 15-30 pound weight loss can improve and occasionally eliminate sleep apnea.  Structured and life-long dietary and health habits are necessary to lose weight and keep healthier weight levels.     Though there may be significant health benefits from weight loss, long-term weight loss is very difficult to achieve- studies show success with dietary management in less than 10% of people. In  addition, substantial weight loss may require years of dietary control and may be difficult if patients have severe obesity. In these cases, surgical management may be considered.  Finally, older individuals who have tolerated obesity without health complications may be less likely to benefit from weight loss strategies.      Body mass index is 30.57 kg/m .    Surgery:    Surgery for obstructive sleep apnea is considered generally only when other therapies fail to work. Surgery may be discussed with you if you are having a difficult time tolerating CPAP and or when there is an abnormal structure that requires surgical correction.  Nose and throat surgeries often enlarge the airway to prevent collapse.  Most of these surgeries create pain for 1-2 weeks and up to half of the most common surgeries are not effective throughout life.  You should carefully discuss the benefits and drawbacks to surgery with your sleep provider and surgeon to determine if it is the best solution for you.   More information  Surgery for MIKE is directed at areas that are responsible for narrowing or complete obstruction of the airway during sleep.  There are a wide range of procedures available to enlarge and/or stabilize the airway to prevent blockage of breathing in the three major areas where it can occur: the palate, tongue, and nasal regions.  Successful surgical treatment depends on the accurate identification of the factors responsible for obstructive sleep apnea in each person.  A personalized approach is required because there is no single treatment that works well for everyone.  Because of anatomic variation, consultation with an examination by a sleep surgeon is a critical first step in determining what surgical options are best for each patient.  In some cases, examination during sedation may be recommended in order to guide the selection of procedures.  Patients will be counseled about risks and benefits as well as the typical  recovery course after surgery. Surgery is typically not a cure for a person s MIKE.  However, surgery will often significantly improve one s MIKE severity (termed  success rate ).  Even in the absence of a cure, surgery will decrease the cardiovascular risk associated with OSA7; improve overall quality of life8 (sleepiness, functionality, sleep quality, etc).      Palate Procedures:  Patients with MIKE often have narrowing of their airway in the region of their tonsils and uvula.  The goals of palate procedures are to widen the airway in this region as well as to help the tissues resist collapse.  Modern palate procedure techniques focus on tissue conservation and soft tissue rearrangement, rather than tissue removal.  Often the uvula is preserved in this procedure. Residual sleep apnea is common in patient after pharyngoplasty with an average reduction in sleep apnea events of 33%2.      Tongue Procedures:  ExamWhile patients are awake, the muscles that surround the throat are active and keep this region open for breathing. These muscles relax during sleep, allowing the tongue and other structures to collapse and block breathing.  There are several different tongue procedures available.  Selection of a tongue base procedure depends on characteristics seen on physical exam.  Generally, procedures are aimed at removing bulky tissues in this area or preventing the back of the tongue from falling back during sleep.  Success rates for tongue surgery range from 50-62%3.    Hypoglossal Nerve Stimulation:  Hypoglossal nerve stimulation has recently received approval from the [place] Food and Drug Administration for the treatment of obstructive sleep apnea.  This is based on research showing that the system was safe and effective in treating sleep apnea6.  Results showed that the median AHI score decreased 68%, from 29.3 to 9.0. This therapy uses an implant system that senses breathing patterns and delivers mild  stimulation to airway muscles, which keeps the airway open during sleep.  The system consists of three fully implanted components: a small generator (similar in size to a pacemaker), a breathing sensor, and a stimulation lead.  Using a small handheld remote, a patient turns the therapy on before bed and off upon awakening.    Candidates for this device must be greater than 18 years of age, have moderate to severe obstructive sleep apnea with less than 25% central events  (AHI between 15-65), BMI less than 35, have tried CPAP/oral appliance for at least 8 weeks without success, and have appropriate upper airway anatomy (determined by a sleep endoscopy performed by Dr. Ervin Garcia or Dr. Carlos Morrison).    Nasal Procedures:  Nasal obstruction can interfere with nasal breathing during the day and night.  Studies have shown that relief of nasal obstruction can improve the ability of some patients to tolerate positive airway pressure therapy for obstructive sleep apnea1.  Treatment options include medications such as nasal saline, topical corticosteroid and antihistamine sprays, and oral medications such as antihistamines or decongestants. Non-surgical treatments can include external nasal dilators for selected patients. If these are not successful by themselves, surgery can improve the nasal airway either alone or in combination with these other options.        Combination Procedures:  Combination of surgical procedures and other treatments may be recommended, particularly if patients have more than one area of narrowing or persistent positional disease.  The success rate of combination surgery ranges from 66-80%2,3.    References  Kailey ROSE. The Role of the Nose in Snoring and Obstructive Sleep Apnoea: An Update.  Eur Arch Otorhinolaryngol. 2011; 268: 1365-73.   Frances SM; Frederic JA; Jose JR; Pallanch JF; Harjeet MARSH; Megan PRESSLEY; Carlos PEREA. Surgical modifications of the upper airway for obstructive sleep apnea in  adults: a systematic review and meta-analysis. SLEEP 2010;33(10):3912-1474. Annette AVERY. Hypopharyngeal surgery in obstructive sleep apnea: an evidence-based medicine review.  Arch Otolaryngol Head Neck Surg. 2006 Feb;132(2):206-13.  Quqiue YH1, Trinidad Y, Glenn LINDSAY. The efficacy of anatomically based multilevel surgery for obstructive sleep apnea. Otolaryngol Head Neck Surg. 2003 Oct;129(4):327-35.  Kezirian E, Goldberg A. Hypopharyngeal Surgery in Obstructive Sleep Apnea: An Evidence-Based Medicine Review. Arch Otolaryngol Head Neck Surg. 2006 Feb;132(2):206-13.  Ángel ROCHA et al. Upper-Airway Stimulation for Obstructive Sleep Apnea.  N Engl J Med. 2014 Jan 9;370(2):139-49.  Lesly Y et al. Increased Incidence of Cardiovascular Disease in Middle-aged Men with Obstructive Sleep Apnea. Am J Respir Crit Care Med; 2002 166: 159-165  Henriquez EM et al. Studying Life Effects and Effectiveness of Palatopharyngoplasty (SLEEP) study: Subjective Outcomes of Isolated Uvulopalatopharyngoplasty. Otolaryngol Head Neck Surg. 2011; 144: 623-631.        WHAT IF I ONLY HAVE SNORING?    Mandibular advancement devices, lateral sleep positioning, long-term weight loss and treatment of nasal allergies have been shown to improve snoring.  Exercising tongue muscles with a game (https://apps.Popset/us/ingrid/Stripe-reduce-snoring/ce9716209556) or stimulating the tongue during the day with a device (https://doi.org/10.3390/vcw87855704) have improved snoring in some individuals.  https://www.tokia.lt.com/  https://www.sleepfoundation.org/best-anti-snoring-mouthpieces-and-mouthguards    Remember to Drive Safe... Drive Alive     Sleep health profoundly affects your health, mood, and your safety.  Thirty three percent of the population (one in three of us) is not getting enough sleep and many have a sleep disorder. Not getting enough sleep or having an untreated / undertreated sleep condition may make us sleepy without even knowing it. In fact, our  driving could be dramatically impaired due to our sleep health. As your provider, here are some things I would like you to know about driving:     Here are some warning signs for impairment and dangerous drowsy driving:              -Having been awake more than 16 hours               -Looking tired               -Eyelid drooping              -Head nodding (it could be too late at this point)              -Driving for more than 30 minutes     Some things you could do to make the driving safer if you are experiencing some drowsiness:              -Stop driving and rest              -Call for transportation              -Make sure your sleep disorder is adequately treated     Some things that have been shown NOT to work when experiencing drowsiness while driving:              -Turning on the radio              -Opening windows              -Eating any  distracting  /  entertaining  foods (e.g., sunflower seeds, candy, or any other)              -Talking on the phone      Your decision may not only impact your life, but also the life of others. Please, remember to drive safe for yourself and all of us.            fair balance

## 2024-02-21 ENCOUNTER — APPOINTMENT (OUTPATIENT)
Dept: GERIATRICS | Facility: HOME HEALTH | Age: 89
End: 2024-02-21
Payer: MEDICARE

## 2024-02-21 VITALS
SYSTOLIC BLOOD PRESSURE: 112 MMHG | OXYGEN SATURATION: 98 % | RESPIRATION RATE: 18 BRPM | DIASTOLIC BLOOD PRESSURE: 62 MMHG | TEMPERATURE: 97.6 F | HEART RATE: 72 BPM

## 2024-02-21 DIAGNOSIS — M47.816 SPONDYLOSIS W/OUT MYELOPATHY OR RADICULOPATHY, LUMBAR REGION: ICD-10-CM

## 2024-02-21 DIAGNOSIS — I10 ESSENTIAL (PRIMARY) HYPERTENSION: ICD-10-CM

## 2024-02-21 PROCEDURE — 99342 HOME/RES VST NEW LOW MDM 30: CPT

## 2024-02-21 RX ORDER — FAMOTIDINE 20 MG/1
20 TABLET, FILM COATED ORAL
Qty: 180 | Refills: 3 | Status: ACTIVE | COMMUNITY

## 2024-02-21 NOTE — ASSESSMENT
[FreeTextEntry1] : # UGIB 12/23 # ROSS # Hx steriod use, meloxicam, & H pylori - PPI changed to h2 by GI, monitor - off iron supplements - iron profile -> wnl 11/23 - Hb 14 11/23 - outpatient GI FU, recently treated for H. Pylori  # ?lung fibrosis - at baseline, no SOB - outpatient Pulm FU, reufsing eval or further interveniton  #HLD - off statin Rx  # HTN - continue metoprolol 25 succinate daily - DASH Diet  # Hx of aortic aneurism - no plan for intervention - follows cardiology - stable ~4.3 cm on TTE, 4.7 on CT - continue betablocker - Target BP < 120/80 - No need to do surveillance given dementia and no plan for surgeries  # insomnia # hx of dementia (Alert, intermittently oriented to Person, time, place) # DNI/DNR/ no feeding tubes - avoid trazadone, Ambien - c/w melatonin 10mg QHS  # pre-DM - a1c 5.9% 11/23  # Constipation - c/w senna - BM 1-2/day  #Generalized Weakness #Gait Instability # R hip pain # trochanteric bursitis - follows pain management - Tylenol PRN - DME for ambulation - pain control - fall precautions - PT/OT??

## 2024-02-21 NOTE — HISTORY OF PRESENT ILLNESS
[FreeTextEntry1] : Patient seen and examined at home.  Patient is homebound 2/2 generalized weakness/debility, AD, and advanced age.  89F w/PMHx of HTN, HLD, CAD, Dementia, Hx of UGIB 12/23 seen for initial HVP evaluation.  Dtr and HHA present during visit.  All report patient at baseline.  No acute complaints.  No CP/SOB. No abdominal complaints with good appetite and regular BM's.  No urinary complaints.  No recent falls.  No skin wounds.  Patient lives with Dtr.  Uses walker for ambulation.  PT/OT completed.  Has HHA 7.5h.7d.    ADL Assessment: - Patient needs assistance to ambulate/transfer - Patient needs assistance to perform toileting - Patient needs assistance to perform personal hygiene/grooming - Patient needs assistance to cook - Patient needs assistance to eat - Patient needs assistance to dress

## 2024-02-21 NOTE — PHYSICAL EXAM
[No Acute Distress] : in no acute distress [Alert] : alert [Sclera] : the sclera and conjunctiva were normal [Normal Outer Ear/Nose] : the ears and nose were normal in appearance [Normal Appearance] : the appearance of the neck was normal [No Respiratory Distress] : no respiratory distress [Supple] : the neck was supple [No Acc Muscle Use] : no accessory muscle use [Respiration, Rhythm And Depth] : normal respiratory rhythm and effort [Normal S1, S2] : normal S1 and S2 [Auscultation Breath Sounds / Voice Sounds] : lungs were clear to auscultation bilaterally [Heart Rate And Rhythm] : heart rate was normal and rhythm regular [Edema] : edema was not present [Abdomen Tenderness] : non-tender [Abdomen Soft] : soft [No Spinal Tenderness] : no spinal tenderness [No Clubbing, Cyanosis] : no clubbing or cyanosis of the fingernails [] : no rash [Skin Lesions] : no skin lesions [No Focal Deficits] : no focal deficits [de-identified] : Confused

## 2024-03-01 ENCOUNTER — OUTPATIENT (OUTPATIENT)
Dept: OUTPATIENT SERVICES | Facility: HOSPITAL | Age: 89
LOS: 1 days | End: 2024-03-01
Payer: MEDICARE

## 2024-03-01 DIAGNOSIS — E55.9 VITAMIN D DEFICIENCY, UNSPECIFIED: ICD-10-CM

## 2024-03-01 DIAGNOSIS — R73.03 PREDIABETES: ICD-10-CM

## 2024-03-01 DIAGNOSIS — Z00.00 ENCOUNTER FOR GENERAL ADULT MEDICAL EXAMINATION WITHOUT ABNORMAL FINDINGS: ICD-10-CM

## 2024-03-01 PROCEDURE — 82306 VITAMIN D 25 HYDROXY: CPT

## 2024-03-01 PROCEDURE — 83036 HEMOGLOBIN GLYCOSYLATED A1C: CPT

## 2024-03-01 PROCEDURE — 86803 HEPATITIS C AB TEST: CPT

## 2024-03-01 PROCEDURE — 80053 COMPREHEN METABOLIC PANEL: CPT

## 2024-03-02 DIAGNOSIS — R73.03 PREDIABETES: ICD-10-CM

## 2024-03-02 DIAGNOSIS — E55.9 VITAMIN D DEFICIENCY, UNSPECIFIED: ICD-10-CM

## 2024-03-02 DIAGNOSIS — Z00.00 ENCOUNTER FOR GENERAL ADULT MEDICAL EXAMINATION WITHOUT ABNORMAL FINDINGS: ICD-10-CM

## 2024-03-08 LAB
25(OH)D3 SERPL-MCNC: 42.8 NG/ML
ALBUMIN SERPL ELPH-MCNC: 4.2 G/DL
ALP BLD-CCNC: 82 U/L
ALT SERPL-CCNC: 18 U/L
ANION GAP SERPL CALC-SCNC: 14 MMOL/L
AST SERPL-CCNC: 23 U/L
BILIRUB SERPL-MCNC: 0.8 MG/DL
BUN SERPL-MCNC: 25 MG/DL
CALCIUM SERPL-MCNC: 10 MG/DL
CHLORIDE SERPL-SCNC: 106 MMOL/L
CO2 SERPL-SCNC: 24 MMOL/L
CREAT SERPL-MCNC: 0.96 MG/DL
EGFR: 55 ML/MIN/1.73M2
ESTIMATED AVERAGE GLUCOSE: 123 MG/DL
GLUCOSE SERPL-MCNC: 99 MG/DL
HBA1C MFR BLD HPLC: 5.9 %
HCV AB SER QL: NONREACTIVE
HCV S/CO RATIO: 0.14 S/CO
POTASSIUM SERPL-SCNC: 4.7 MMOL/L
PROT SERPL-MCNC: 6.5 G/DL
SODIUM SERPL-SCNC: 143 MMOL/L

## 2024-03-27 ENCOUNTER — APPOINTMENT (OUTPATIENT)
Dept: GERIATRICS | Facility: CLINIC | Age: 89
End: 2024-03-27
Payer: MEDICARE

## 2024-03-27 VITALS
SYSTOLIC BLOOD PRESSURE: 122 MMHG | DIASTOLIC BLOOD PRESSURE: 80 MMHG | BODY MASS INDEX: 23.74 KG/M2 | WEIGHT: 129 LBS | HEIGHT: 62 IN

## 2024-03-27 DIAGNOSIS — G47.00 INSOMNIA, UNSPECIFIED: ICD-10-CM

## 2024-03-27 DIAGNOSIS — K59.09 OTHER CONSTIPATION: ICD-10-CM

## 2024-03-27 DIAGNOSIS — F03.90 UNSPECIFIED DEMENTIA W/OUT BEHAVIORAL DISTURBANCE: ICD-10-CM

## 2024-03-27 DIAGNOSIS — T39.395A GASTROINTESTINAL HEMORRHAGE, UNSPECIFIED: ICD-10-CM

## 2024-03-27 DIAGNOSIS — R73.03 PREDIABETES.: ICD-10-CM

## 2024-03-27 DIAGNOSIS — K92.2 GASTROINTESTINAL HEMORRHAGE, UNSPECIFIED: ICD-10-CM

## 2024-03-27 PROCEDURE — 99214 OFFICE O/P EST MOD 30 MIN: CPT

## 2024-03-27 NOTE — PHYSICAL EXAM
[Alert] : alert [No Respiratory Distress] : no respiratory distress [No Acute Distress] : in no acute distress [Respiration, Rhythm And Depth] : normal respiratory rhythm and effort [Normal S1, S2] : normal S1 and S2 [Heart Rate And Rhythm] : heart rate was normal and rhythm regular [Abdomen Tenderness] : non-tender [Abdomen Soft] : soft

## 2024-03-29 NOTE — HISTORY OF PRESENT ILLNESS
[FreeTextEntry1] : 89F w/PMHx of HTN, HLD, CAD, Dementia, Hx of UGIB 12/23 seen for initial HVP evaluation, presented for follow up.  [No falls in past year] : Patient reported no falls in the past year [Completely Dependent] : Completely dependent. [Stable] : Status: Stable [Moderate] : Stage: Moderate [Memory Lapses Or Loss] : stable memory impairment [Patient Observed To Be Agitated] : worsened agitation [Hostility Toward Caregivers] : denies aggression [] : stable wandering [Sleep Disturbances] : worsened sleep disturbances [Difficulty Finding Desired Words] : denies difficulty finding desired words [Fixed Beliefs Contradicted By Reality (Delusions)] : denies delusions

## 2024-03-29 NOTE — CURRENT MEDS
[Yes] : Reviewed medication list for presence of high-risk medications. [] : does not miss any medication doses [Patient/caregiver able to verbalize understanding of medications, including indications and side effects] : Patient/caregiver able to verbalize understanding of medications, including indications and side effects

## 2024-03-29 NOTE — ASSESSMENT
[FreeTextEntry1] : 89F w/PMHx of HTN, HLD, CAD, Dementia, Hx of UGIB 12/23 seen for initial HVP evaluation, presented for follow up  # UGIB 12/23 # ROSS # Hx steriod use, meloxicam, & H pylori - PPI changed to h2 by GI, monitor - off iron supplements - iron profile -> wnl 11/23 - Hb 14 11/23 - outpatient GI FU, recently treated for H. Pylori  # ?lung fibrosis - at baseline, no SOB - outpatient Pulm FU, reufsing eval or further interveniton  #HLD - off statin Rx  # HTN - continue metoprolol 25 succinate daily - DASH Diet  # Hx of aortic aneurism - no plan for intervention - follows cardiology - stable ~4.3 cm on TTE, 4.7 on CT - continue betablocker - Target BP < 120/80 - No need to do surveillance given dementia and no plan for surgeries  # insomnia # hx of dementia (Alert, intermittently oriented to Person, time, place) # DNI/DNR/ no feeding tubes - avoid trazadone, Ambien, have been tried at SNF, patient with paradoxical agitation and more confused - c/w melatonin 10mg QHS, add Seroquel 25 mg qhs, side effects discussed including increased risk of death in dementia patient, daughter expressed understanding - significant care giver burden: daughter works during the day and attends to the patient at night, patient wakes up at least 3 times during the night, benefits of treatment outweigh the risks  # pre-DM - a1c 5.9% 03/24  # Constipation - c/w senna - BM 1/day  #Generalized Weakness #Gait Instability # R hip pain # trochanteric bursitis - follows pain management - Tylenol PRN

## 2024-07-02 ENCOUNTER — RX RENEWAL (OUTPATIENT)
Age: 89
End: 2024-07-02

## 2024-07-03 ENCOUNTER — RX RENEWAL (OUTPATIENT)
Age: 89
End: 2024-07-03

## 2024-07-15 DIAGNOSIS — R10.32 RIGHT LOWER QUADRANT PAIN: ICD-10-CM

## 2024-07-15 DIAGNOSIS — L30.4 ERYTHEMA INTERTRIGO: ICD-10-CM

## 2024-07-15 DIAGNOSIS — G89.29 RIGHT LOWER QUADRANT PAIN: ICD-10-CM

## 2024-07-15 DIAGNOSIS — R10.31 RIGHT LOWER QUADRANT PAIN: ICD-10-CM

## 2024-07-15 RX ORDER — NYSTATIN 100000 1/G
100000 POWDER TOPICAL
Qty: 1 | Refills: 0 | Status: ACTIVE | COMMUNITY
Start: 2024-07-15 | End: 1900-01-01

## 2024-07-29 ENCOUNTER — APPOINTMENT (OUTPATIENT)
Dept: GERIATRICS | Facility: CLINIC | Age: 89
End: 2024-07-29
Payer: MEDICARE

## 2024-07-29 PROCEDURE — 99441: CPT | Mod: 93

## 2024-08-25 NOTE — SWALLOW BEDSIDE ASSESSMENT ADULT - SLP PERTINENT HISTORY OF CURRENT PROBLEM
DISPLAY PLAN FREE TEXT
pt is a 89 y/o F w/ PMHx: HTN, HLD, cardiac arrhythmia, and CAD seen as Trauma Alert s/p mechanical fall w/ ?HT, ?LOC, -AC, no external signs of trauma. Pt w/ acute displaced fracture extending from the right superior pubic ramus through the pubic body and into the right inferior pubic ramus, acute nondisplaced right sacral fracture. CTH, CT cervical spine, and CXR (-)
89F w/ PMHx of HTN, HLD, cardiac arrhythmia, and CAD seen as Trauma Alert s/p mechanical fall w/ ?HT, ?LOC, -AC (although pt does not remember the fall). Fall was witnessed by daughter this evening, when she fell down some stairs onto her right side. Trauma assessment in ED: ABCs intact, GCS 15. - CXR

## 2024-08-26 ENCOUNTER — RX RENEWAL (OUTPATIENT)
Age: 89
End: 2024-08-26

## 2024-08-26 LAB
25(OH)D3 SERPL-MCNC: 43 NG/ML
ALBUMIN SERPL ELPH-MCNC: 4 G/DL
ALP BLD-CCNC: 87 U/L
ALT SERPL-CCNC: 20 U/L
ANION GAP SERPL CALC-SCNC: 11 MMOL/L
AST SERPL-CCNC: 23 U/L
BASOPHILS # BLD AUTO: 0.06 K/UL
BASOPHILS NFR BLD AUTO: 1 %
BILIRUB SERPL-MCNC: 0.6 MG/DL
BUN SERPL-MCNC: 27 MG/DL
CALCIUM SERPL-MCNC: 9.5 MG/DL
CHLORIDE SERPL-SCNC: 107 MMOL/L
CO2 SERPL-SCNC: 24 MMOL/L
CREAT SERPL-MCNC: 0.9 MG/DL
EGFR: 59 ML/MIN/1.73M2
EOSINOPHIL # BLD AUTO: 0.39 K/UL
EOSINOPHIL NFR BLD AUTO: 6.8 %
ESTIMATED AVERAGE GLUCOSE: 114 MG/DL
GLUCOSE SERPL-MCNC: 105 MG/DL
HBA1C MFR BLD HPLC: 5.6 %
HCT VFR BLD CALC: 39.9 %
HGB BLD-MCNC: 13 G/DL
IMM GRANULOCYTES NFR BLD AUTO: 0.3 %
LYMPHOCYTES # BLD AUTO: 1.91 K/UL
LYMPHOCYTES NFR BLD AUTO: 33.2 %
MAN DIFF?: NORMAL
MCHC RBC-ENTMCNC: 29.8 PG
MCHC RBC-ENTMCNC: 32.6 G/DL
MCV RBC AUTO: 91.5 FL
MONOCYTES # BLD AUTO: 0.62 K/UL
MONOCYTES NFR BLD AUTO: 10.8 %
NEUTROPHILS # BLD AUTO: 2.75 K/UL
NEUTROPHILS NFR BLD AUTO: 47.9 %
PLATELET # BLD AUTO: 144 K/UL
PMV BLD AUTO: 0 /100 WBCS
POTASSIUM SERPL-SCNC: 4.6 MMOL/L
PROT SERPL-MCNC: 6.6 G/DL
RBC # BLD: 4.36 M/UL
RBC # FLD: 13.2 %
SODIUM SERPL-SCNC: 142 MMOL/L
TSH SERPL-ACNC: 1.25 UIU/ML
VIT B12 SERPL-MCNC: 740 PG/ML
WBC # FLD AUTO: 5.75 K/UL

## 2024-09-04 ENCOUNTER — APPOINTMENT (OUTPATIENT)
Dept: GERIATRICS | Facility: CLINIC | Age: 89
End: 2024-09-04

## 2024-09-04 VITALS
WEIGHT: 129 LBS | BODY MASS INDEX: 23.74 KG/M2 | HEIGHT: 62 IN | OXYGEN SATURATION: 95 % | DIASTOLIC BLOOD PRESSURE: 80 MMHG | SYSTOLIC BLOOD PRESSURE: 122 MMHG | HEART RATE: 60 BPM

## 2024-09-04 DIAGNOSIS — R10.32 RIGHT LOWER QUADRANT PAIN: ICD-10-CM

## 2024-09-04 DIAGNOSIS — G47.00 INSOMNIA, UNSPECIFIED: ICD-10-CM

## 2024-09-04 DIAGNOSIS — K59.09 OTHER CONSTIPATION: ICD-10-CM

## 2024-09-04 DIAGNOSIS — R10.31 RIGHT LOWER QUADRANT PAIN: ICD-10-CM

## 2024-09-04 DIAGNOSIS — G89.29 RIGHT LOWER QUADRANT PAIN: ICD-10-CM

## 2024-09-04 DIAGNOSIS — F03.90 UNSPECIFIED DEMENTIA W/OUT BEHAVIORAL DISTURBANCE: ICD-10-CM

## 2024-09-04 PROCEDURE — 99214 OFFICE O/P EST MOD 30 MIN: CPT

## 2024-09-04 PROCEDURE — G2211 COMPLEX E/M VISIT ADD ON: CPT

## 2024-09-05 NOTE — PHYSICAL EXAM
[Alert] : alert [No Respiratory Distress] : no respiratory distress [Respiration, Rhythm And Depth] : normal respiratory rhythm and effort [Normal S1, S2] : normal S1 and S2 [Heart Rate And Rhythm] : heart rate was normal and rhythm regular [Abdomen Tenderness] : non-tender [Abdomen Soft] : soft [EOMI] : extraocular movements were intact [No Oral Pallor] : no oral pallor [No Oral Cyanosis] : no oral cyanosis [Auscultation Breath Sounds / Voice Sounds] : lungs were clear to auscultation bilaterally [Heart Sounds Gallop] : no gallops [Edema] : edema was not present [Pedal Pulses Normal] : the pedal pulses are present [Supraclavicular Lymph Nodes Enlarged Bilaterally] : supraclavicular [No Spinal Tenderness] : no spinal tenderness [Involuntary Movements] : no involuntary movements were seen [No Joint Swelling] : no joint swelling seen [] : no rash [JVD] : there was jugular-venous distention [Normal Insight/Judgment] : insight and judgment were not intact [de-identified] :  bounds [de-identified] : 0/3

## 2024-09-05 NOTE — HISTORY OF PRESENT ILLNESS
[No falls in past year] : Patient reported no falls in the past year [Completely Dependent] : Completely dependent. [Moderate] : Stage: Moderate [Stable] : Status: Stable [Memory Lapses Or Loss] : stable memory impairment [Hostility Toward Caregivers] : denies aggression [] : stable wandering [Fixed Beliefs Contradicted By Reality (Delusions)] : denies delusions [Difficulty Finding Desired Words] : denies difficulty finding desired words [Patient Observed To Be Agitated] : improved agitation [Sleep Disturbances] : improved sleep disturbances [With Patient/Caregiver] : , with patient/caregiver [Designated Healthcare Proxy] : Designated healthcare proxy [Name: ___] : Health Care Proxy's Name: [unfilled]  [Relationship: ___] : Relationship: [unfilled] [Comfort care only] : comfort care only [DNR] : DNR [I will adhere to the patient's wishes.] : I will adhere to the patient's wishes. [Time Spent: ___ minutes] : Time Spent: [unfilled] minutes [FreeTextEntry1] : 89 F w/PMHx of HTN, HLD, CAD, Dementia/AD, Hx of UGIB 12/23 presented for follow up, daughter reports intermittent abdominal pain, resolves with BM, no N/V, no appetite change, no black stools, no blood in the stool. Patient's agitation much improved with Seroquel, still at times agitated and anxious, especially in the afternoon. [AdvancecareDate] : 09/4/24 [FreeTextEntry4] : ESTHELA given for review

## 2024-09-05 NOTE — ASSESSMENT
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, with behavioral disturbance, insomnia # DNI/DNR/ no feeding tubes - avoid trazadone, Ambien, have been tried at SNF, patient with paradoxical agitation and more confused - sleeping much improved with Seroquel 25 mg qhs,  - significant care giver burden: daughter works during the day and attends to the patient at night, patient used to wake up at least 3 times during the night, now still wakes up but much easier to manage at night, short periods of being awake - can increase Seroquel: use 12.5 mg as needed for afternoon agitation, may increase pm dose to 1.5 tab if needed  - MOLST given, comfort care only  # hx UGIB 12/23: off PPI, stools normal, no melena, no over blood # ROSS # Hx steriod use, meloxicam, & H pylori - stop h2 by GI, may contribute to confusion - off iron supplements, iron profile -> wnl 11/23 - outpatient GI FU, treated for H. Pylori  # ?lung fibrosis - at baseline, no SOB - outpatient Pulm FU, comfort care only, no further intervention  #HLD - off statin Rx  # HTN - continue metoprolol 25 succinate daily - DASH Diet # Hx of aortic aneurism - no plan for intervention - follows cardiology - stable ~4.3 cm on TTE, 4.7 on CT - continue betablocker - Target BP < 130/80 - No need to do surveillance given dementia and no plan for surgeries  # pre-DM - a1c 5.9% 03/24, follow up  # Constipation, with intermittent abdominal pain - hold senna, start colace 2 tabs q hs - BM 1/day  #Generalized Weakness #Gait Instability # R hip pain # trochanteric bursitis - resolves - Tylenol PRN     HCM: flu shot inOctober

## 2024-09-05 NOTE — PHYSICAL EXAM
[Alert] : alert [No Respiratory Distress] : no respiratory distress [Respiration, Rhythm And Depth] : normal respiratory rhythm and effort [Normal S1, S2] : normal S1 and S2 [Heart Rate And Rhythm] : heart rate was normal and rhythm regular [Abdomen Tenderness] : non-tender [Abdomen Soft] : soft [EOMI] : extraocular movements were intact [No Oral Pallor] : no oral pallor [No Oral Cyanosis] : no oral cyanosis [Auscultation Breath Sounds / Voice Sounds] : lungs were clear to auscultation bilaterally [Heart Sounds Gallop] : no gallops [Edema] : edema was not present [Pedal Pulses Normal] : the pedal pulses are present [Supraclavicular Lymph Nodes Enlarged Bilaterally] : supraclavicular [No Spinal Tenderness] : no spinal tenderness [Involuntary Movements] : no involuntary movements were seen [No Joint Swelling] : no joint swelling seen [] : no rash [JVD] : there was jugular-venous distention [Normal Insight/Judgment] : insight and judgment were not intact [de-identified] :  bounds [de-identified] : 0/3

## 2024-09-12 ENCOUNTER — APPOINTMENT (OUTPATIENT)
Dept: CARDIOLOGY | Facility: CLINIC | Age: 89
End: 2024-09-12
Payer: MEDICARE

## 2024-09-12 VITALS
BODY MASS INDEX: 23.74 KG/M2 | SYSTOLIC BLOOD PRESSURE: 140 MMHG | DIASTOLIC BLOOD PRESSURE: 78 MMHG | HEART RATE: 66 BPM | WEIGHT: 129 LBS | HEIGHT: 62 IN

## 2024-09-12 DIAGNOSIS — I25.10 ATHEROSCLEROTIC HEART DISEASE OF NATIVE CORONARY ARTERY W/OUT ANGINA PECTORIS: ICD-10-CM

## 2024-09-12 DIAGNOSIS — I10 ESSENTIAL (PRIMARY) HYPERTENSION: ICD-10-CM

## 2024-09-12 DIAGNOSIS — I77.810 THORACIC AORTIC ECTASIA: ICD-10-CM

## 2024-09-12 DIAGNOSIS — E78.00 PURE HYPERCHOLESTEROLEMIA, UNSPECIFIED: ICD-10-CM

## 2024-09-12 PROCEDURE — 93000 ELECTROCARDIOGRAM COMPLETE: CPT

## 2024-09-12 PROCEDURE — G2211 COMPLEX E/M VISIT ADD ON: CPT

## 2024-09-12 PROCEDURE — 99214 OFFICE O/P EST MOD 30 MIN: CPT

## 2024-09-12 RX ORDER — DOCUSATE SODIUM 100 MG/1
100 CAPSULE ORAL
Qty: 60 | Refills: 5 | Status: DISCONTINUED | COMMUNITY
Start: 2024-09-05 | End: 2024-09-12

## 2024-09-12 RX ORDER — DOCUSATE SODIUM 50 MG/5ML
100 LIQUID ORAL AT BEDTIME
Qty: 180 | Refills: 1 | Status: ACTIVE | COMMUNITY
Start: 2024-09-12 | End: 1900-01-01

## 2024-09-12 NOTE — ASSESSMENT
[FreeTextEntry1] : 89 year old woman with HTN, HLD and ascending aorta dilatation who presents for follow up today.   1. 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  - Continue metoprolol - No need for surveillance as we will not be doing any surgery  2. HTN: BP at goal today. Her goal is <140/90 due to frailty. She is at goal at home as well. Her daughter checks her BP diligently.   The goal is to keep patient comfortable. She is DNR/DNI. We discussed that if there are any acute cardiac issues, they can make a follow up appointment. Otherwise, f/u PRN.

## 2024-09-12 NOTE — HISTORY OF PRESENT ILLNESS
[FreeTextEntry1] : KIN HERNANDEZ is a 89 year old woman with HTN, HLD and ascending aorta dilatation who presents for follow up today.   She has dementia so unable to get a history from her. Her daughter says that two days ago, she had an episode where her eyes rolled into the back of her head and she was shaking. They patted her on the back and within a few minutes she was back to normal. They did not go to the hospital.    For her thoracic aortic aneurysm, most recent CT scan from [facility] shows linear dimension of 4.6 cm. She is on metoprolol.   For her HTN, her BP is well controlled at home off anti-hypertensives.

## 2024-09-12 NOTE — PHYSICAL EXAM
[Well Developed] : well developed [Well Nourished] : well nourished [No Acute Distress] : no acute distress [Normal Conjunctiva] : normal conjunctiva [Normal Venous Pressure] : normal venous pressure [No Carotid Bruit] : no carotid bruit [Normal S1, S2] : normal S1, S2 [No Murmur] : no murmur [No Rub] : no rub [No Gallop] : no gallop [Clear Lung Fields] : clear lung fields [Good Air Entry] : good air entry [No Respiratory Distress] : no respiratory distress  [Soft] : abdomen soft [Non Tender] : non-tender [No Masses/organomegaly] : no masses/organomegaly [Normal Bowel Sounds] : normal bowel sounds [Normal Gait] : normal gait [No Edema] : no edema [No Cyanosis] : no cyanosis [No Clubbing] : no clubbing [No Varicosities] : no varicosities [Edema ___] : edema [unfilled] [No Skin Lesions] : no skin lesions [Moves all extremities] : moves all extremities [No Focal Deficits] : no focal deficits [Normal Speech] : normal speech [Alert and Oriented] : alert and oriented [Normal memory] : normal memory [de-identified] : Erythema over RLE

## 2024-09-12 NOTE — CARDIOLOGY SUMMARY
[de-identified] : 9/12/24: sinus rhythm with APCs 6/21/23: normal sinus rhythm [de-identified] : 6/16/23: Normal left and right ventricular size and systolic function. Midl MR, Mild PH, PASP 40mmHg. Aortic root 3.77, Proximal 4.3 cm.\par  6/11/22:\par  1. Normal LV size and function. G1DD.\par  2. RWMA. \par  3. Normal RV size and function.\par  4. Aortic root, 4.0 cm. Ascending aorta, 4.3 cm.

## 2024-11-20 ENCOUNTER — APPOINTMENT (OUTPATIENT)
Dept: GERIATRICS | Facility: CLINIC | Age: 89
End: 2024-11-20
Payer: MEDICARE

## 2024-11-20 VITALS
BODY MASS INDEX: 23.74 KG/M2 | SYSTOLIC BLOOD PRESSURE: 138 MMHG | HEART RATE: 62 BPM | HEIGHT: 62 IN | TEMPERATURE: 97.4 F | DIASTOLIC BLOOD PRESSURE: 88 MMHG | OXYGEN SATURATION: 96 % | WEIGHT: 129 LBS

## 2024-11-20 DIAGNOSIS — K92.2 GASTROINTESTINAL HEMORRHAGE, UNSPECIFIED: ICD-10-CM

## 2024-11-20 DIAGNOSIS — F03.90 UNSPECIFIED DEMENTIA W/OUT BEHAVIORAL DISTURBANCE: ICD-10-CM

## 2024-11-20 DIAGNOSIS — J98.4 OTHER DISORDERS OF LUNG: ICD-10-CM

## 2024-11-20 DIAGNOSIS — R73.03 PREDIABETES.: ICD-10-CM

## 2024-11-20 DIAGNOSIS — Z23 ENCOUNTER FOR IMMUNIZATION: ICD-10-CM

## 2024-11-20 DIAGNOSIS — I10 ESSENTIAL (PRIMARY) HYPERTENSION: ICD-10-CM

## 2024-11-20 DIAGNOSIS — Z00.00 ENCOUNTER FOR GENERAL ADULT MEDICAL EXAMINATION W/OUT ABNORMAL FINDINGS: ICD-10-CM

## 2024-11-20 DIAGNOSIS — K59.09 OTHER CONSTIPATION: ICD-10-CM

## 2024-11-20 DIAGNOSIS — T39.395A GASTROINTESTINAL HEMORRHAGE, UNSPECIFIED: ICD-10-CM

## 2024-11-20 PROCEDURE — G2211 COMPLEX E/M VISIT ADD ON: CPT

## 2024-11-20 PROCEDURE — 99214 OFFICE O/P EST MOD 30 MIN: CPT

## 2024-11-20 RX ORDER — DOCUSATE SODIUM 50 MG/5ML
100 LIQUID ORAL
Qty: 1 | Refills: 6 | Status: ACTIVE | COMMUNITY
Start: 2024-11-20 | End: 1900-01-01

## 2025-08-08 ENCOUNTER — APPOINTMENT (OUTPATIENT)
Dept: GERIATRICS | Facility: HOME HEALTH | Age: 89
End: 2025-08-08
Payer: MEDICARE

## 2025-08-08 VITALS
RESPIRATION RATE: 16 BRPM | DIASTOLIC BLOOD PRESSURE: 62 MMHG | TEMPERATURE: 98.2 F | OXYGEN SATURATION: 96 % | SYSTOLIC BLOOD PRESSURE: 112 MMHG | HEART RATE: 72 BPM

## 2025-08-08 DIAGNOSIS — M47.816 SPONDYLOSIS W/OUT MYELOPATHY OR RADICULOPATHY, LUMBAR REGION: ICD-10-CM

## 2025-08-08 DIAGNOSIS — I77.810 THORACIC AORTIC ECTASIA: ICD-10-CM

## 2025-08-08 DIAGNOSIS — R73.03 PREDIABETES.: ICD-10-CM

## 2025-08-08 DIAGNOSIS — F03.90 UNSPECIFIED DEMENTIA W/OUT BEHAVIORAL DISTURBANCE: ICD-10-CM

## 2025-08-08 DIAGNOSIS — I10 ESSENTIAL (PRIMARY) HYPERTENSION: ICD-10-CM

## 2025-08-08 DIAGNOSIS — K59.09 OTHER CONSTIPATION: ICD-10-CM

## 2025-08-08 DIAGNOSIS — J98.4 OTHER DISORDERS OF LUNG: ICD-10-CM

## 2025-08-08 PROCEDURE — 99348 HOME/RES VST EST LOW MDM 30: CPT

## 2025-08-08 PROCEDURE — 99342 HOME/RES VST NEW LOW MDM 30: CPT

## 2025-08-08 RX ORDER — SENNOSIDES 8.6 MG/1
8.6 TABLET ORAL
Refills: 0 | Status: ACTIVE | COMMUNITY

## 2025-08-26 ENCOUNTER — NON-APPOINTMENT (OUTPATIENT)
Age: 89
End: 2025-08-26

## 2025-08-26 ENCOUNTER — INPATIENT (INPATIENT)
Facility: HOSPITAL | Age: 89
LOS: 4 days | Discharge: HOME HEALTH | DRG: 378 | End: 2025-08-31
Attending: STUDENT IN AN ORGANIZED HEALTH CARE EDUCATION/TRAINING PROGRAM | Admitting: INTERNAL MEDICINE
Payer: MEDICARE

## 2025-08-26 VITALS
HEIGHT: 62 IN | WEIGHT: 119.05 LBS | SYSTOLIC BLOOD PRESSURE: 96 MMHG | RESPIRATION RATE: 20 BRPM | TEMPERATURE: 98 F | DIASTOLIC BLOOD PRESSURE: 73 MMHG | HEART RATE: 68 BPM | OXYGEN SATURATION: 96 %

## 2025-08-26 DIAGNOSIS — K92.2 GASTROINTESTINAL HEMORRHAGE, UNSPECIFIED: ICD-10-CM

## 2025-08-26 LAB
ALBUMIN SERPL ELPH-MCNC: 4.1 G/DL — SIGNIFICANT CHANGE UP (ref 3.5–5.2)
ALP SERPL-CCNC: 76 U/L — SIGNIFICANT CHANGE UP (ref 30–115)
ALT FLD-CCNC: 16 U/L — SIGNIFICANT CHANGE UP (ref 0–41)
ANION GAP SERPL CALC-SCNC: 12 MMOL/L — SIGNIFICANT CHANGE UP (ref 7–14)
APTT BLD: 30.4 SEC — SIGNIFICANT CHANGE UP (ref 27–39.2)
AST SERPL-CCNC: 19 U/L — SIGNIFICANT CHANGE UP (ref 0–41)
BASOPHILS # BLD AUTO: 0.06 K/UL — SIGNIFICANT CHANGE UP (ref 0–0.2)
BASOPHILS NFR BLD AUTO: 0.9 % — SIGNIFICANT CHANGE UP (ref 0–2)
BILIRUB SERPL-MCNC: 0.8 MG/DL — SIGNIFICANT CHANGE UP (ref 0.2–1.2)
BLD GP AB SCN SERPL QL: SIGNIFICANT CHANGE UP
BUN SERPL-MCNC: 39 MG/DL — HIGH (ref 10–20)
CALCIUM SERPL-MCNC: 9.7 MG/DL — SIGNIFICANT CHANGE UP (ref 8.4–10.5)
CHLORIDE SERPL-SCNC: 107 MMOL/L — SIGNIFICANT CHANGE UP (ref 98–110)
CO2 SERPL-SCNC: 22 MMOL/L — SIGNIFICANT CHANGE UP (ref 17–32)
CREAT SERPL-MCNC: 0.9 MG/DL — SIGNIFICANT CHANGE UP (ref 0.7–1.5)
EGFR: 59 ML/MIN/1.73M2 — LOW
EGFR: 59 ML/MIN/1.73M2 — LOW
EOSINOPHIL # BLD AUTO: 0.27 K/UL — SIGNIFICANT CHANGE UP (ref 0–0.5)
EOSINOPHIL NFR BLD AUTO: 4.2 % — SIGNIFICANT CHANGE UP (ref 0–6)
GLUCOSE SERPL-MCNC: 104 MG/DL — HIGH (ref 70–99)
HCT VFR BLD CALC: 37.6 % — SIGNIFICANT CHANGE UP (ref 34.5–45)
HGB BLD-MCNC: 12.2 G/DL — SIGNIFICANT CHANGE UP (ref 11.5–15.5)
IMM GRANULOCYTES # BLD AUTO: 0.02 K/UL — SIGNIFICANT CHANGE UP (ref 0–0.07)
IMM GRANULOCYTES NFR BLD AUTO: 0.3 % — SIGNIFICANT CHANGE UP (ref 0–0.9)
INR BLD: 1.21 RATIO — SIGNIFICANT CHANGE UP (ref 0.65–1.3)
LACTATE SERPL-SCNC: 1.6 MMOL/L — SIGNIFICANT CHANGE UP (ref 0.7–2)
LYMPHOCYTES # BLD AUTO: 1.94 K/UL — SIGNIFICANT CHANGE UP (ref 1–3.3)
LYMPHOCYTES NFR BLD AUTO: 30.5 % — SIGNIFICANT CHANGE UP (ref 13–44)
MCHC RBC-ENTMCNC: 29.5 PG — SIGNIFICANT CHANGE UP (ref 27–34)
MCHC RBC-ENTMCNC: 32.4 G/DL — SIGNIFICANT CHANGE UP (ref 32–36)
MCV RBC AUTO: 91 FL — SIGNIFICANT CHANGE UP (ref 80–100)
MONOCYTES # BLD AUTO: 0.7 K/UL — SIGNIFICANT CHANGE UP (ref 0–0.9)
MONOCYTES NFR BLD AUTO: 11 % — SIGNIFICANT CHANGE UP (ref 2–14)
NEUTROPHILS # BLD AUTO: 3.38 K/UL — SIGNIFICANT CHANGE UP (ref 1.8–7.4)
NEUTROPHILS NFR BLD AUTO: 53.1 % — SIGNIFICANT CHANGE UP (ref 43–77)
NRBC # BLD AUTO: 0 K/UL — SIGNIFICANT CHANGE UP (ref 0–0)
NRBC # FLD: 0 K/UL — SIGNIFICANT CHANGE UP (ref 0–0)
NRBC BLD AUTO-RTO: 0 /100 WBCS — SIGNIFICANT CHANGE UP (ref 0–0)
PLATELET # BLD AUTO: 121 K/UL — LOW (ref 150–400)
PMV BLD: 10.6 FL — SIGNIFICANT CHANGE UP (ref 7–13)
POTASSIUM SERPL-MCNC: 4.7 MMOL/L — SIGNIFICANT CHANGE UP (ref 3.5–5)
POTASSIUM SERPL-SCNC: 4.7 MMOL/L — SIGNIFICANT CHANGE UP (ref 3.5–5)
PROT SERPL-MCNC: 7.1 G/DL — SIGNIFICANT CHANGE UP (ref 6–8)
PROTHROM AB SERPL-ACNC: 14.3 SEC — HIGH (ref 9.95–12.87)
RBC # BLD: 4.13 M/UL — SIGNIFICANT CHANGE UP (ref 3.8–5.2)
RBC # FLD: 13.5 % — SIGNIFICANT CHANGE UP (ref 10.3–14.5)
SODIUM SERPL-SCNC: 141 MMOL/L — SIGNIFICANT CHANGE UP (ref 135–146)
WBC # BLD: 6.37 K/UL — SIGNIFICANT CHANGE UP (ref 3.8–10.5)
WBC # FLD AUTO: 6.37 K/UL — SIGNIFICANT CHANGE UP (ref 3.8–10.5)

## 2025-08-26 PROCEDURE — 97110 THERAPEUTIC EXERCISES: CPT | Mod: GP

## 2025-08-26 PROCEDURE — 85027 COMPLETE CBC AUTOMATED: CPT

## 2025-08-26 PROCEDURE — 99285 EMERGENCY DEPT VISIT HI MDM: CPT | Mod: FS

## 2025-08-26 PROCEDURE — 80048 BASIC METABOLIC PNL TOTAL CA: CPT

## 2025-08-26 PROCEDURE — 93010 ELECTROCARDIOGRAM REPORT: CPT

## 2025-08-26 PROCEDURE — 85025 COMPLETE CBC W/AUTO DIFF WBC: CPT

## 2025-08-26 PROCEDURE — 86900 BLOOD TYPING SEROLOGIC ABO: CPT

## 2025-08-26 PROCEDURE — 99222 1ST HOSP IP/OBS MODERATE 55: CPT

## 2025-08-26 PROCEDURE — 97116 GAIT TRAINING THERAPY: CPT | Mod: GP

## 2025-08-26 PROCEDURE — 86850 RBC ANTIBODY SCREEN: CPT

## 2025-08-26 PROCEDURE — 86901 BLOOD TYPING SEROLOGIC RH(D): CPT

## 2025-08-26 PROCEDURE — 74174 CTA ABD&PLVS W/CONTRAST: CPT | Mod: 26

## 2025-08-26 PROCEDURE — 93005 ELECTROCARDIOGRAM TRACING: CPT

## 2025-08-26 PROCEDURE — 71045 X-RAY EXAM CHEST 1 VIEW: CPT | Mod: 26

## 2025-08-26 PROCEDURE — 97162 PT EVAL MOD COMPLEX 30 MIN: CPT | Mod: GP

## 2025-08-26 PROCEDURE — 92610 EVALUATE SWALLOWING FUNCTION: CPT | Mod: GN

## 2025-08-26 PROCEDURE — 36415 COLL VENOUS BLD VENIPUNCTURE: CPT

## 2025-08-26 RX ORDER — MELATONIN 5 MG
3 TABLET ORAL AT BEDTIME
Refills: 0 | Status: DISCONTINUED | OUTPATIENT
Start: 2025-08-26 | End: 2025-08-26

## 2025-08-26 RX ORDER — ACETAMINOPHEN 500 MG/5ML
650 LIQUID (ML) ORAL EVERY 6 HOURS
Refills: 0 | Status: DISCONTINUED | OUTPATIENT
Start: 2025-08-26 | End: 2025-08-31

## 2025-08-26 RX ORDER — DORZOLAMIDE 20 MG/ML
1 SOLUTION/ DROPS OPHTHALMIC THREE TIMES A DAY
Refills: 0 | Status: DISCONTINUED | OUTPATIENT
Start: 2025-08-26 | End: 2025-08-31

## 2025-08-26 RX ORDER — MAGNESIUM, ALUMINUM HYDROXIDE 200-200 MG
30 TABLET,CHEWABLE ORAL EVERY 4 HOURS
Refills: 0 | Status: DISCONTINUED | OUTPATIENT
Start: 2025-08-26 | End: 2025-08-31

## 2025-08-26 RX ORDER — METOPROLOL SUCCINATE 50 MG/1
25 TABLET, EXTENDED RELEASE ORAL DAILY
Refills: 0 | Status: DISCONTINUED | OUTPATIENT
Start: 2025-08-26 | End: 2025-08-27

## 2025-08-26 RX ORDER — SENNA 187 MG
2 TABLET ORAL AT BEDTIME
Refills: 0 | Status: DISCONTINUED | OUTPATIENT
Start: 2025-08-26 | End: 2025-08-31

## 2025-08-26 RX ORDER — MIDAZOLAM IN 0.9 % SOD.CHLORID 1 MG/ML
2 PLASTIC BAG, INJECTION (ML) INTRAVENOUS ONCE
Refills: 0 | Status: DISCONTINUED | OUTPATIENT
Start: 2025-08-26 | End: 2025-08-26

## 2025-08-26 RX ORDER — BRIMONIDINE TARTRATE 1.5 MG/ML
1 SOLUTION/ DROPS OPHTHALMIC THREE TIMES A DAY
Refills: 0 | Status: DISCONTINUED | OUTPATIENT
Start: 2025-08-26 | End: 2025-08-31

## 2025-08-26 RX ORDER — ONDANSETRON HCL/PF 4 MG/2 ML
4 VIAL (ML) INJECTION EVERY 8 HOURS
Refills: 0 | Status: DISCONTINUED | OUTPATIENT
Start: 2025-08-26 | End: 2025-08-31

## 2025-08-26 RX ORDER — QUETIAPINE FUMARATE 25 MG/1
25 TABLET ORAL
Refills: 0 | Status: DISCONTINUED | OUTPATIENT
Start: 2025-08-26 | End: 2025-08-26

## 2025-08-26 RX ORDER — QUETIAPINE FUMARATE 25 MG/1
37.5 TABLET ORAL AT BEDTIME
Refills: 0 | Status: DISCONTINUED | OUTPATIENT
Start: 2025-08-26 | End: 2025-08-27

## 2025-08-26 RX ADMIN — Medication 2 MILLIGRAM(S): at 16:59

## 2025-08-26 RX ADMIN — QUETIAPINE FUMARATE 37.5 MILLIGRAM(S): 25 TABLET ORAL at 22:42

## 2025-08-26 RX ADMIN — Medication 10 MG/HR: at 22:46

## 2025-08-26 RX ADMIN — Medication 80 MILLIGRAM(S): at 18:50

## 2025-08-26 RX ADMIN — Medication 10 MG/HR: at 18:49

## 2025-08-27 DIAGNOSIS — K92.2 GASTROINTESTINAL HEMORRHAGE, UNSPECIFIED: ICD-10-CM

## 2025-08-27 DIAGNOSIS — F03.90 UNSPECIFIED DEMENTIA, UNSPECIFIED SEVERITY, WITHOUT BEHAVIORAL DISTURBANCE, PSYCHOTIC DISTURBANCE, MOOD DISTURBANCE, AND ANXIETY: ICD-10-CM

## 2025-08-27 DIAGNOSIS — Z71.89 OTHER SPECIFIED COUNSELING: ICD-10-CM

## 2025-08-27 DIAGNOSIS — Z51.5 ENCOUNTER FOR PALLIATIVE CARE: ICD-10-CM

## 2025-08-27 LAB
ANION GAP SERPL CALC-SCNC: 13 MMOL/L — SIGNIFICANT CHANGE UP (ref 7–14)
BASOPHILS # BLD AUTO: 0.06 — SIGNIFICANT CHANGE UP
BASOPHILS NFR BLD AUTO: 0.6 — SIGNIFICANT CHANGE UP
BUN SERPL-MCNC: 32 MG/DL — HIGH (ref 10–20)
CALCIUM SERPL-MCNC: 9.4 MG/DL — SIGNIFICANT CHANGE UP (ref 8.4–10.5)
CHLORIDE SERPL-SCNC: 108 MMOL/L — SIGNIFICANT CHANGE UP (ref 98–110)
CO2 SERPL-SCNC: 20 MMOL/L — SIGNIFICANT CHANGE UP (ref 17–32)
CREAT SERPL-MCNC: 0.8 MG/DL — SIGNIFICANT CHANGE UP (ref 0.7–1.5)
EGFR: 67 ML/MIN/1.73M2 — SIGNIFICANT CHANGE UP
EGFR: 67 ML/MIN/1.73M2 — SIGNIFICANT CHANGE UP
EOSINOPHIL # BLD AUTO: 0.12 — SIGNIFICANT CHANGE UP
EOSINOPHIL NFR BLD AUTO: 1.2 — SIGNIFICANT CHANGE UP
GLUCOSE SERPL-MCNC: 88 MG/DL — SIGNIFICANT CHANGE UP (ref 70–99)
HCT VFR BLD CALC: 35.8 % — SIGNIFICANT CHANGE UP (ref 34.5–45)
HCT VFR BLD CALC: 35.9 % — SIGNIFICANT CHANGE UP (ref 34.5–45)
HGB BLD-MCNC: 11.5 G/DL — SIGNIFICANT CHANGE UP (ref 11.5–15.5)
HGB BLD-MCNC: 11.7 G/DL — SIGNIFICANT CHANGE UP (ref 11.5–15.5)
LYMPHOCYTES # BLD AUTO: 0.88 — SIGNIFICANT CHANGE UP
LYMPHOCYTES # BLD AUTO: 8.9 — SIGNIFICANT CHANGE UP
MCHC RBC-ENTMCNC: 29.1 PG — SIGNIFICANT CHANGE UP (ref 27–34)
MCHC RBC-ENTMCNC: 29.8 PG — SIGNIFICANT CHANGE UP (ref 27–34)
MCHC RBC-ENTMCNC: 32.1 G/DL — SIGNIFICANT CHANGE UP (ref 32–36)
MCHC RBC-ENTMCNC: 32.6 G/DL — SIGNIFICANT CHANGE UP (ref 32–36)
MCV RBC AUTO: 90.6 FL — SIGNIFICANT CHANGE UP (ref 80–100)
MCV RBC AUTO: 91.3 FL — SIGNIFICANT CHANGE UP (ref 80–100)
MONOCYTES # BLD AUTO: 0.73 — SIGNIFICANT CHANGE UP
MONOCYTES NFR BLD AUTO: 7.4 — SIGNIFICANT CHANGE UP
NEUTROPHILS # BLD AUTO: 8 — SIGNIFICANT CHANGE UP
NEUTROPHILS NFR BLD AUTO: 81.3 — SIGNIFICANT CHANGE UP
NRBC # BLD AUTO: 0 K/UL — SIGNIFICANT CHANGE UP (ref 0–0)
NRBC # FLD: 0 K/UL — SIGNIFICANT CHANGE UP (ref 0–0)
NRBC BLD AUTO-RTO: 0 /100 WBCS — SIGNIFICANT CHANGE UP (ref 0–0)
NRBC BLD AUTO-RTO: 0 /100 WBCS — SIGNIFICANT CHANGE UP (ref 0–0)
PLATELET # BLD AUTO: 106 K/UL — LOW (ref 150–400)
PLATELET # BLD AUTO: 119 K/UL — LOW (ref 150–400)
PMV BLD: 10.6 FL — SIGNIFICANT CHANGE UP (ref 7–13)
POTASSIUM SERPL-MCNC: 4.7 MMOL/L — SIGNIFICANT CHANGE UP (ref 3.5–5)
POTASSIUM SERPL-SCNC: 4.7 MMOL/L — SIGNIFICANT CHANGE UP (ref 3.5–5)
RBC # BLD: 3.93 M/UL — SIGNIFICANT CHANGE UP (ref 3.8–5.2)
RBC # BLD: 3.95 M/UL — SIGNIFICANT CHANGE UP (ref 3.8–5.2)
RBC # FLD: 13.5 % — SIGNIFICANT CHANGE UP (ref 10.3–14.5)
RBC # FLD: 13.6 % — SIGNIFICANT CHANGE UP (ref 10.3–14.5)
SODIUM SERPL-SCNC: 141 MMOL/L — SIGNIFICANT CHANGE UP (ref 135–146)
WBC # BLD: 6.64 K/UL — SIGNIFICANT CHANGE UP (ref 3.8–10.5)
WBC # BLD: 9.85 K/UL — SIGNIFICANT CHANGE UP (ref 3.8–10.5)
WBC # FLD AUTO: 6.64 K/UL — SIGNIFICANT CHANGE UP (ref 3.8–10.5)
WBC # FLD AUTO: 9.85 K/UL — SIGNIFICANT CHANGE UP (ref 3.8–10.5)

## 2025-08-27 PROCEDURE — 99233 SBSQ HOSP IP/OBS HIGH 50: CPT

## 2025-08-27 PROCEDURE — 99223 1ST HOSP IP/OBS HIGH 75: CPT

## 2025-08-27 PROCEDURE — 99497 ADVNCD CARE PLAN 30 MIN: CPT | Mod: 25

## 2025-08-27 PROCEDURE — 99223 1ST HOSP IP/OBS HIGH 75: CPT | Mod: 25

## 2025-08-27 RX ORDER — SODIUM CHLORIDE 9 G/1000ML
1000 INJECTION, SOLUTION INTRAVENOUS
Refills: 0 | Status: DISCONTINUED | OUTPATIENT
Start: 2025-08-28 | End: 2025-08-31

## 2025-08-27 RX ORDER — SODIUM CHLORIDE 9 G/1000ML
500 INJECTION, SOLUTION INTRAVENOUS ONCE
Refills: 0 | Status: DISCONTINUED | OUTPATIENT
Start: 2025-08-27 | End: 2025-08-27

## 2025-08-27 RX ORDER — QUETIAPINE FUMARATE 25 MG/1
50 TABLET ORAL AT BEDTIME
Refills: 0 | Status: DISCONTINUED | OUTPATIENT
Start: 2025-08-27 | End: 2025-08-31

## 2025-08-27 RX ORDER — HYDROXYZINE HYDROCHLORIDE 25 MG/1
25 TABLET, FILM COATED ORAL ONCE
Refills: 0 | Status: COMPLETED | OUTPATIENT
Start: 2025-08-27 | End: 2025-08-27

## 2025-08-27 RX ADMIN — HYDROXYZINE HYDROCHLORIDE 25 MILLIGRAM(S): 25 TABLET, FILM COATED ORAL at 01:33

## 2025-08-27 RX ADMIN — BRIMONIDINE TARTRATE 1 DROP(S): 1.5 SOLUTION/ DROPS OPHTHALMIC at 09:32

## 2025-08-27 RX ADMIN — Medication 2 TABLET(S): at 21:11

## 2025-08-27 RX ADMIN — DORZOLAMIDE 1 DROP(S): 20 SOLUTION/ DROPS OPHTHALMIC at 13:36

## 2025-08-27 RX ADMIN — BRIMONIDINE TARTRATE 1 DROP(S): 1.5 SOLUTION/ DROPS OPHTHALMIC at 13:36

## 2025-08-27 RX ADMIN — METOPROLOL SUCCINATE 25 MILLIGRAM(S): 50 TABLET, EXTENDED RELEASE ORAL at 09:32

## 2025-08-27 RX ADMIN — DORZOLAMIDE 1 DROP(S): 20 SOLUTION/ DROPS OPHTHALMIC at 09:32

## 2025-08-27 RX ADMIN — BRIMONIDINE TARTRATE 1 DROP(S): 1.5 SOLUTION/ DROPS OPHTHALMIC at 21:10

## 2025-08-27 RX ADMIN — DORZOLAMIDE 1 DROP(S): 20 SOLUTION/ DROPS OPHTHALMIC at 21:10

## 2025-08-27 RX ADMIN — QUETIAPINE FUMARATE 50 MILLIGRAM(S): 25 TABLET ORAL at 21:10

## 2025-08-27 RX ADMIN — Medication 10 MG/HR: at 13:36

## 2025-08-27 RX ADMIN — Medication 1 APPLICATION(S): at 11:39

## 2025-08-28 LAB
ANION GAP SERPL CALC-SCNC: 13 MMOL/L — SIGNIFICANT CHANGE UP (ref 7–14)
BASOPHILS # BLD AUTO: 0.06 K/UL — SIGNIFICANT CHANGE UP (ref 0–0.2)
BASOPHILS NFR BLD AUTO: 1 % — SIGNIFICANT CHANGE UP (ref 0–2)
BUN SERPL-MCNC: 42 MG/DL — HIGH (ref 10–20)
CALCIUM SERPL-MCNC: 8.9 MG/DL — SIGNIFICANT CHANGE UP (ref 8.4–10.5)
CHLORIDE SERPL-SCNC: 107 MMOL/L — SIGNIFICANT CHANGE UP (ref 98–110)
CO2 SERPL-SCNC: 18 MMOL/L — SIGNIFICANT CHANGE UP (ref 17–32)
CREAT SERPL-MCNC: 1.3 MG/DL — SIGNIFICANT CHANGE UP (ref 0.7–1.5)
EGFR: 38 ML/MIN/1.73M2 — LOW
EGFR: 38 ML/MIN/1.73M2 — LOW
EOSINOPHIL # BLD AUTO: 0.13 K/UL — SIGNIFICANT CHANGE UP (ref 0–0.5)
EOSINOPHIL NFR BLD AUTO: 2.2 % — SIGNIFICANT CHANGE UP (ref 0–6)
GLUCOSE SERPL-MCNC: 89 MG/DL — SIGNIFICANT CHANGE UP (ref 70–99)
HCT VFR BLD CALC: 32.1 % — LOW (ref 34.5–45)
HGB BLD-MCNC: 10.3 G/DL — LOW (ref 11.5–15.5)
IMM GRANULOCYTES # BLD AUTO: 0.01 K/UL — SIGNIFICANT CHANGE UP (ref 0–0.07)
IMM GRANULOCYTES NFR BLD AUTO: 0.2 % — SIGNIFICANT CHANGE UP (ref 0–0.9)
IMMATURE PLATELET FRACTION #: 4 K/UL — LOW (ref 4.7–11.1)
IMMATURE PLATELET FRACTION %: 3.8 % — SIGNIFICANT CHANGE UP (ref 1.6–4.9)
LYMPHOCYTES # BLD AUTO: 1.43 K/UL — SIGNIFICANT CHANGE UP (ref 1–3.3)
LYMPHOCYTES NFR BLD AUTO: 24.2 % — SIGNIFICANT CHANGE UP (ref 13–44)
MCHC RBC-ENTMCNC: 29.3 PG — SIGNIFICANT CHANGE UP (ref 27–34)
MCHC RBC-ENTMCNC: 32.1 G/DL — SIGNIFICANT CHANGE UP (ref 32–36)
MCV RBC AUTO: 91.2 FL — SIGNIFICANT CHANGE UP (ref 80–100)
MONOCYTES # BLD AUTO: 0.66 K/UL — SIGNIFICANT CHANGE UP (ref 0–0.9)
MONOCYTES NFR BLD AUTO: 11.1 % — SIGNIFICANT CHANGE UP (ref 2–14)
NEUTROPHILS # BLD AUTO: 3.63 K/UL — SIGNIFICANT CHANGE UP (ref 1.8–7.4)
NEUTROPHILS NFR BLD AUTO: 61.3 % — SIGNIFICANT CHANGE UP (ref 43–77)
NRBC # BLD AUTO: 0 K/UL — SIGNIFICANT CHANGE UP (ref 0–0)
NRBC # FLD: 0 K/UL — SIGNIFICANT CHANGE UP (ref 0–0)
NRBC BLD AUTO-RTO: 0 /100 WBCS — SIGNIFICANT CHANGE UP (ref 0–0)
PLATELET # BLD AUTO: 106 K/UL — LOW (ref 150–400)
PMV BLD: 10.9 FL — SIGNIFICANT CHANGE UP (ref 7–13)
POTASSIUM SERPL-MCNC: 4.4 MMOL/L — SIGNIFICANT CHANGE UP (ref 3.5–5)
POTASSIUM SERPL-SCNC: 4.4 MMOL/L — SIGNIFICANT CHANGE UP (ref 3.5–5)
RBC # BLD: 3.52 M/UL — LOW (ref 3.8–5.2)
RBC # FLD: 13.8 % — SIGNIFICANT CHANGE UP (ref 10.3–14.5)
SODIUM SERPL-SCNC: 138 MMOL/L — SIGNIFICANT CHANGE UP (ref 135–146)
WBC # BLD: 5.92 K/UL — SIGNIFICANT CHANGE UP (ref 3.8–10.5)
WBC # FLD AUTO: 5.92 K/UL — SIGNIFICANT CHANGE UP (ref 3.8–10.5)

## 2025-08-28 PROCEDURE — 99232 SBSQ HOSP IP/OBS MODERATE 35: CPT | Mod: 25

## 2025-08-28 PROCEDURE — 99233 SBSQ HOSP IP/OBS HIGH 50: CPT

## 2025-08-28 RX ORDER — QUETIAPINE FUMARATE 25 MG/1
0 TABLET ORAL
Qty: 0 | Refills: 1 | DISCHARGE

## 2025-08-28 RX ORDER — QUETIAPINE FUMARATE 25 MG/1
1 TABLET ORAL
Qty: 14 | Refills: 0
Start: 2025-08-28 | End: 2025-09-10

## 2025-08-28 RX ORDER — HALOPERIDOL 10 MG/1
0.5 TABLET ORAL ONCE
Refills: 0 | Status: COMPLETED | OUTPATIENT
Start: 2025-08-28 | End: 2025-08-28

## 2025-08-28 RX ADMIN — DORZOLAMIDE 1 DROP(S): 20 SOLUTION/ DROPS OPHTHALMIC at 06:09

## 2025-08-28 RX ADMIN — BRIMONIDINE TARTRATE 1 DROP(S): 1.5 SOLUTION/ DROPS OPHTHALMIC at 06:09

## 2025-08-28 RX ADMIN — SODIUM CHLORIDE 75 MILLILITER(S): 9 INJECTION, SOLUTION INTRAVENOUS at 17:34

## 2025-08-28 RX ADMIN — Medication 2 TABLET(S): at 21:21

## 2025-08-28 RX ADMIN — Medication 40 MILLIGRAM(S): at 17:34

## 2025-08-28 RX ADMIN — SODIUM CHLORIDE 50 MILLILITER(S): 9 INJECTION, SOLUTION INTRAVENOUS at 06:08

## 2025-08-28 RX ADMIN — DORZOLAMIDE 1 DROP(S): 20 SOLUTION/ DROPS OPHTHALMIC at 21:22

## 2025-08-28 RX ADMIN — SODIUM CHLORIDE 75 MILLILITER(S): 9 INJECTION, SOLUTION INTRAVENOUS at 22:57

## 2025-08-28 RX ADMIN — BRIMONIDINE TARTRATE 1 DROP(S): 1.5 SOLUTION/ DROPS OPHTHALMIC at 14:26

## 2025-08-28 RX ADMIN — QUETIAPINE FUMARATE 50 MILLIGRAM(S): 25 TABLET ORAL at 21:21

## 2025-08-28 RX ADMIN — Medication 1 APPLICATION(S): at 14:26

## 2025-08-28 RX ADMIN — DORZOLAMIDE 1 DROP(S): 20 SOLUTION/ DROPS OPHTHALMIC at 14:27

## 2025-08-28 RX ADMIN — BRIMONIDINE TARTRATE 1 DROP(S): 1.5 SOLUTION/ DROPS OPHTHALMIC at 21:21

## 2025-08-29 ENCOUNTER — TRANSCRIPTION ENCOUNTER (OUTPATIENT)
Age: 89
End: 2025-08-29

## 2025-08-29 PROBLEM — K92.2 UPPER GI BLEED: Status: ACTIVE | Noted: 2025-08-29

## 2025-08-29 LAB
ANION GAP SERPL CALC-SCNC: 14 MMOL/L — SIGNIFICANT CHANGE UP (ref 7–14)
BASOPHILS # BLD AUTO: 0.07 K/UL — SIGNIFICANT CHANGE UP (ref 0–0.2)
BASOPHILS NFR BLD AUTO: 1.4 % — SIGNIFICANT CHANGE UP (ref 0–2)
BUN SERPL-MCNC: 32 MG/DL — HIGH (ref 10–20)
CALCIUM SERPL-MCNC: 9.2 MG/DL — SIGNIFICANT CHANGE UP (ref 8.4–10.5)
CHLORIDE SERPL-SCNC: 107 MMOL/L — SIGNIFICANT CHANGE UP (ref 98–110)
CO2 SERPL-SCNC: 20 MMOL/L — SIGNIFICANT CHANGE UP (ref 17–32)
CREAT SERPL-MCNC: 1.2 MG/DL — SIGNIFICANT CHANGE UP (ref 0.7–1.5)
EGFR: 41 ML/MIN/1.73M2 — LOW
EGFR: 41 ML/MIN/1.73M2 — LOW
EOSINOPHIL # BLD AUTO: 0.37 K/UL — SIGNIFICANT CHANGE UP (ref 0–0.5)
EOSINOPHIL NFR BLD AUTO: 7.5 % — HIGH (ref 0–6)
GLUCOSE SERPL-MCNC: 102 MG/DL — HIGH (ref 70–99)
HCT VFR BLD CALC: 30.1 % — LOW (ref 34.5–45)
HGB BLD-MCNC: 9.8 G/DL — LOW (ref 11.5–15.5)
IMM GRANULOCYTES # BLD AUTO: 0.01 K/UL — SIGNIFICANT CHANGE UP (ref 0–0.07)
IMM GRANULOCYTES NFR BLD AUTO: 0.2 % — SIGNIFICANT CHANGE UP (ref 0–0.9)
IMMATURE PLATELET FRACTION #: 2.8 K/UL — LOW (ref 4.7–11.1)
IMMATURE PLATELET FRACTION %: 2.8 % — SIGNIFICANT CHANGE UP (ref 1.6–4.9)
LYMPHOCYTES # BLD AUTO: 1.31 K/UL — SIGNIFICANT CHANGE UP (ref 1–3.3)
LYMPHOCYTES NFR BLD AUTO: 26.7 % — SIGNIFICANT CHANGE UP (ref 13–44)
MCHC RBC-ENTMCNC: 29.3 PG — SIGNIFICANT CHANGE UP (ref 27–34)
MCHC RBC-ENTMCNC: 32.6 G/DL — SIGNIFICANT CHANGE UP (ref 32–36)
MCV RBC AUTO: 90.1 FL — SIGNIFICANT CHANGE UP (ref 80–100)
MONOCYTES # BLD AUTO: 0.49 K/UL — SIGNIFICANT CHANGE UP (ref 0–0.9)
MONOCYTES NFR BLD AUTO: 10 % — SIGNIFICANT CHANGE UP (ref 2–14)
NEUTROPHILS # BLD AUTO: 2.66 K/UL — SIGNIFICANT CHANGE UP (ref 1.8–7.4)
NEUTROPHILS NFR BLD AUTO: 54.2 % — SIGNIFICANT CHANGE UP (ref 43–77)
NRBC # BLD AUTO: 0 K/UL — SIGNIFICANT CHANGE UP (ref 0–0)
NRBC # FLD: 0 K/UL — SIGNIFICANT CHANGE UP (ref 0–0)
NRBC BLD AUTO-RTO: 0 /100 WBCS — SIGNIFICANT CHANGE UP (ref 0–0)
PLATELET # BLD AUTO: 99 K/UL — LOW (ref 150–400)
PMV BLD: 10.3 FL — SIGNIFICANT CHANGE UP (ref 7–13)
POTASSIUM SERPL-MCNC: 4.3 MMOL/L — SIGNIFICANT CHANGE UP (ref 3.5–5)
POTASSIUM SERPL-SCNC: 4.3 MMOL/L — SIGNIFICANT CHANGE UP (ref 3.5–5)
RBC # BLD: 3.34 M/UL — LOW (ref 3.8–5.2)
RBC # FLD: 13.8 % — SIGNIFICANT CHANGE UP (ref 10.3–14.5)
SODIUM SERPL-SCNC: 141 MMOL/L — SIGNIFICANT CHANGE UP (ref 135–146)
WBC # BLD: 4.91 K/UL — SIGNIFICANT CHANGE UP (ref 3.8–10.5)
WBC # FLD AUTO: 4.91 K/UL — SIGNIFICANT CHANGE UP (ref 3.8–10.5)

## 2025-08-29 PROCEDURE — 99233 SBSQ HOSP IP/OBS HIGH 50: CPT

## 2025-08-29 PROCEDURE — 99232 SBSQ HOSP IP/OBS MODERATE 35: CPT | Mod: 25

## 2025-08-29 RX ADMIN — Medication 40 MILLIGRAM(S): at 19:02

## 2025-08-29 RX ADMIN — BRIMONIDINE TARTRATE 1 DROP(S): 1.5 SOLUTION/ DROPS OPHTHALMIC at 19:04

## 2025-08-29 RX ADMIN — DORZOLAMIDE 1 DROP(S): 20 SOLUTION/ DROPS OPHTHALMIC at 06:01

## 2025-08-29 RX ADMIN — Medication 1 APPLICATION(S): at 19:02

## 2025-08-29 RX ADMIN — Medication 40 MILLIGRAM(S): at 06:01

## 2025-08-29 RX ADMIN — DORZOLAMIDE 1 DROP(S): 20 SOLUTION/ DROPS OPHTHALMIC at 21:13

## 2025-08-29 RX ADMIN — Medication 2 TABLET(S): at 21:12

## 2025-08-29 RX ADMIN — QUETIAPINE FUMARATE 50 MILLIGRAM(S): 25 TABLET ORAL at 21:11

## 2025-08-29 RX ADMIN — BRIMONIDINE TARTRATE 1 DROP(S): 1.5 SOLUTION/ DROPS OPHTHALMIC at 21:13

## 2025-08-29 RX ADMIN — BRIMONIDINE TARTRATE 1 DROP(S): 1.5 SOLUTION/ DROPS OPHTHALMIC at 06:01

## 2025-08-29 RX ADMIN — DORZOLAMIDE 1 DROP(S): 20 SOLUTION/ DROPS OPHTHALMIC at 19:04

## 2025-08-29 RX ADMIN — SODIUM CHLORIDE 75 MILLILITER(S): 9 INJECTION, SOLUTION INTRAVENOUS at 21:13

## 2025-08-30 LAB
ANION GAP SERPL CALC-SCNC: 13 MMOL/L — SIGNIFICANT CHANGE UP (ref 7–14)
BASOPHILS # BLD AUTO: 0.05 K/UL — SIGNIFICANT CHANGE UP (ref 0–0.2)
BASOPHILS # BLD AUTO: 0.06 K/UL — SIGNIFICANT CHANGE UP (ref 0–0.2)
BASOPHILS NFR BLD AUTO: 1 % — SIGNIFICANT CHANGE UP (ref 0–2)
BASOPHILS NFR BLD AUTO: 1 % — SIGNIFICANT CHANGE UP (ref 0–2)
BUN SERPL-MCNC: 23 MG/DL — HIGH (ref 10–20)
CALCIUM SERPL-MCNC: 9.5 MG/DL — SIGNIFICANT CHANGE UP (ref 8.4–10.5)
CHLORIDE SERPL-SCNC: 106 MMOL/L — SIGNIFICANT CHANGE UP (ref 98–110)
CO2 SERPL-SCNC: 22 MMOL/L — SIGNIFICANT CHANGE UP (ref 17–32)
CREAT SERPL-MCNC: 1 MG/DL — SIGNIFICANT CHANGE UP (ref 0.7–1.5)
EGFR: 52 ML/MIN/1.73M2 — LOW
EGFR: 52 ML/MIN/1.73M2 — LOW
EOSINOPHIL # BLD AUTO: 0.31 K/UL — SIGNIFICANT CHANGE UP (ref 0–0.5)
EOSINOPHIL # BLD AUTO: 0.37 K/UL — SIGNIFICANT CHANGE UP (ref 0–0.5)
EOSINOPHIL NFR BLD AUTO: 5.2 % — SIGNIFICANT CHANGE UP (ref 0–6)
EOSINOPHIL NFR BLD AUTO: 7.1 % — HIGH (ref 0–6)
GLUCOSE SERPL-MCNC: 98 MG/DL — SIGNIFICANT CHANGE UP (ref 70–99)
HCT VFR BLD CALC: 29.4 % — LOW (ref 34.5–45)
HCT VFR BLD CALC: 29.5 % — LOW (ref 34.5–45)
HGB BLD-MCNC: 9.6 G/DL — LOW (ref 11.5–15.5)
HGB BLD-MCNC: 9.9 G/DL — LOW (ref 11.5–15.5)
IMM GRANULOCYTES # BLD AUTO: 0.02 K/UL — SIGNIFICANT CHANGE UP (ref 0–0.07)
IMM GRANULOCYTES # BLD AUTO: 0.03 K/UL — SIGNIFICANT CHANGE UP (ref 0–0.07)
IMM GRANULOCYTES NFR BLD AUTO: 0.4 % — SIGNIFICANT CHANGE UP (ref 0–0.9)
IMM GRANULOCYTES NFR BLD AUTO: 0.5 % — SIGNIFICANT CHANGE UP (ref 0–0.9)
LYMPHOCYTES # BLD AUTO: 1.26 K/UL — SIGNIFICANT CHANGE UP (ref 1–3.3)
LYMPHOCYTES # BLD AUTO: 1.39 K/UL — SIGNIFICANT CHANGE UP (ref 1–3.3)
LYMPHOCYTES NFR BLD AUTO: 23.2 % — SIGNIFICANT CHANGE UP (ref 13–44)
LYMPHOCYTES NFR BLD AUTO: 24.1 % — SIGNIFICANT CHANGE UP (ref 13–44)
MCHC RBC-ENTMCNC: 29.2 PG — SIGNIFICANT CHANGE UP (ref 27–34)
MCHC RBC-ENTMCNC: 29.8 PG — SIGNIFICANT CHANGE UP (ref 27–34)
MCHC RBC-ENTMCNC: 32.7 G/DL — SIGNIFICANT CHANGE UP (ref 32–36)
MCHC RBC-ENTMCNC: 33.6 G/DL — SIGNIFICANT CHANGE UP (ref 32–36)
MCV RBC AUTO: 88.9 FL — SIGNIFICANT CHANGE UP (ref 80–100)
MCV RBC AUTO: 89.4 FL — SIGNIFICANT CHANGE UP (ref 80–100)
MONOCYTES # BLD AUTO: 0.54 K/UL — SIGNIFICANT CHANGE UP (ref 0–0.9)
MONOCYTES # BLD AUTO: 0.7 K/UL — SIGNIFICANT CHANGE UP (ref 0–0.9)
MONOCYTES NFR BLD AUTO: 10.3 % — SIGNIFICANT CHANGE UP (ref 2–14)
MONOCYTES NFR BLD AUTO: 11.7 % — SIGNIFICANT CHANGE UP (ref 2–14)
NEUTROPHILS # BLD AUTO: 2.98 K/UL — SIGNIFICANT CHANGE UP (ref 1.8–7.4)
NEUTROPHILS # BLD AUTO: 3.5 K/UL — SIGNIFICANT CHANGE UP (ref 1.8–7.4)
NEUTROPHILS NFR BLD AUTO: 57.1 % — SIGNIFICANT CHANGE UP (ref 43–77)
NEUTROPHILS NFR BLD AUTO: 58.4 % — SIGNIFICANT CHANGE UP (ref 43–77)
NRBC # BLD AUTO: 0 K/UL — SIGNIFICANT CHANGE UP (ref 0–0)
NRBC # BLD AUTO: 0 K/UL — SIGNIFICANT CHANGE UP (ref 0–0)
NRBC # FLD: 0 K/UL — SIGNIFICANT CHANGE UP (ref 0–0)
NRBC # FLD: 0 K/UL — SIGNIFICANT CHANGE UP (ref 0–0)
NRBC BLD AUTO-RTO: 0 /100 WBCS — SIGNIFICANT CHANGE UP (ref 0–0)
NRBC BLD AUTO-RTO: 0 /100 WBCS — SIGNIFICANT CHANGE UP (ref 0–0)
PLATELET # BLD AUTO: 105 K/UL — LOW (ref 150–400)
PLATELET # BLD AUTO: 106 K/UL — LOW (ref 150–400)
PMV BLD: 10.6 FL — SIGNIFICANT CHANGE UP (ref 7–13)
PMV BLD: 10.8 FL — SIGNIFICANT CHANGE UP (ref 7–13)
POTASSIUM SERPL-MCNC: 4.4 MMOL/L — SIGNIFICANT CHANGE UP (ref 3.5–5)
POTASSIUM SERPL-SCNC: 4.4 MMOL/L — SIGNIFICANT CHANGE UP (ref 3.5–5)
RBC # BLD: 3.29 M/UL — LOW (ref 3.8–5.2)
RBC # BLD: 3.32 M/UL — LOW (ref 3.8–5.2)
RBC # FLD: 13.5 % — SIGNIFICANT CHANGE UP (ref 10.3–14.5)
RBC # FLD: 13.5 % — SIGNIFICANT CHANGE UP (ref 10.3–14.5)
SODIUM SERPL-SCNC: 141 MMOL/L — SIGNIFICANT CHANGE UP (ref 135–146)
WBC # BLD: 5.22 K/UL — SIGNIFICANT CHANGE UP (ref 3.8–10.5)
WBC # BLD: 5.99 K/UL — SIGNIFICANT CHANGE UP (ref 3.8–10.5)
WBC # FLD AUTO: 5.22 K/UL — SIGNIFICANT CHANGE UP (ref 3.8–10.5)
WBC # FLD AUTO: 5.99 K/UL — SIGNIFICANT CHANGE UP (ref 3.8–10.5)

## 2025-08-30 PROCEDURE — 99233 SBSQ HOSP IP/OBS HIGH 50: CPT

## 2025-08-30 RX ORDER — METOPROLOL SUCCINATE 50 MG/1
25 TABLET, EXTENDED RELEASE ORAL DAILY
Refills: 0 | Status: DISCONTINUED | OUTPATIENT
Start: 2025-08-30 | End: 2025-08-31

## 2025-08-30 RX ADMIN — Medication 40 MILLIGRAM(S): at 17:45

## 2025-08-30 RX ADMIN — Medication 40 MILLIGRAM(S): at 05:41

## 2025-08-30 RX ADMIN — Medication 1 APPLICATION(S): at 12:35

## 2025-08-30 RX ADMIN — SODIUM CHLORIDE 75 MILLILITER(S): 9 INJECTION, SOLUTION INTRAVENOUS at 10:24

## 2025-08-30 RX ADMIN — METOPROLOL SUCCINATE 25 MILLIGRAM(S): 50 TABLET, EXTENDED RELEASE ORAL at 17:43

## 2025-08-30 RX ADMIN — Medication 650 MILLIGRAM(S): at 14:22

## 2025-08-30 RX ADMIN — SODIUM CHLORIDE 75 MILLILITER(S): 9 INJECTION, SOLUTION INTRAVENOUS at 21:15

## 2025-08-30 RX ADMIN — DORZOLAMIDE 1 DROP(S): 20 SOLUTION/ DROPS OPHTHALMIC at 05:41

## 2025-08-30 RX ADMIN — DORZOLAMIDE 1 DROP(S): 20 SOLUTION/ DROPS OPHTHALMIC at 21:18

## 2025-08-30 RX ADMIN — DORZOLAMIDE 1 DROP(S): 20 SOLUTION/ DROPS OPHTHALMIC at 13:17

## 2025-08-30 RX ADMIN — QUETIAPINE FUMARATE 50 MILLIGRAM(S): 25 TABLET ORAL at 21:17

## 2025-08-30 RX ADMIN — BRIMONIDINE TARTRATE 1 DROP(S): 1.5 SOLUTION/ DROPS OPHTHALMIC at 21:18

## 2025-08-30 RX ADMIN — Medication 40 MILLIGRAM(S): at 10:25

## 2025-08-30 RX ADMIN — BRIMONIDINE TARTRATE 1 DROP(S): 1.5 SOLUTION/ DROPS OPHTHALMIC at 13:17

## 2025-08-30 RX ADMIN — Medication 2 TABLET(S): at 21:17

## 2025-08-30 RX ADMIN — BRIMONIDINE TARTRATE 1 DROP(S): 1.5 SOLUTION/ DROPS OPHTHALMIC at 05:41

## 2025-08-30 RX ADMIN — Medication 650 MILLIGRAM(S): at 14:52

## 2025-08-31 VITALS
SYSTOLIC BLOOD PRESSURE: 140 MMHG | RESPIRATION RATE: 18 BRPM | HEART RATE: 62 BPM | DIASTOLIC BLOOD PRESSURE: 78 MMHG | TEMPERATURE: 97 F | OXYGEN SATURATION: 98 %

## 2025-08-31 DIAGNOSIS — D62 ACUTE POSTHEMORRHAGIC ANEMIA: ICD-10-CM

## 2025-08-31 LAB
ANION GAP SERPL CALC-SCNC: 14 MMOL/L — SIGNIFICANT CHANGE UP (ref 7–14)
BASOPHILS # BLD AUTO: 0.06 K/UL — SIGNIFICANT CHANGE UP (ref 0–0.2)
BASOPHILS NFR BLD AUTO: 1.3 % — SIGNIFICANT CHANGE UP (ref 0–2)
BLD GP AB SCN SERPL QL: SIGNIFICANT CHANGE UP
BUN SERPL-MCNC: 22 MG/DL — HIGH (ref 10–20)
CALCIUM SERPL-MCNC: 9.5 MG/DL — SIGNIFICANT CHANGE UP (ref 8.4–10.5)
CHLORIDE SERPL-SCNC: 106 MMOL/L — SIGNIFICANT CHANGE UP (ref 98–110)
CO2 SERPL-SCNC: 22 MMOL/L — SIGNIFICANT CHANGE UP (ref 17–32)
CREAT SERPL-MCNC: 1.2 MG/DL — SIGNIFICANT CHANGE UP (ref 0.7–1.5)
EGFR: 41 ML/MIN/1.73M2 — LOW
EGFR: 41 ML/MIN/1.73M2 — LOW
EOSINOPHIL # BLD AUTO: 0.39 K/UL — SIGNIFICANT CHANGE UP (ref 0–0.5)
EOSINOPHIL NFR BLD AUTO: 8.4 % — HIGH (ref 0–6)
GLUCOSE SERPL-MCNC: 99 MG/DL — SIGNIFICANT CHANGE UP (ref 70–99)
HCT VFR BLD CALC: 29 % — LOW (ref 34.5–45)
HGB BLD-MCNC: 9.6 G/DL — LOW (ref 11.5–15.5)
IMM GRANULOCYTES # BLD AUTO: 0.01 K/UL — SIGNIFICANT CHANGE UP (ref 0–0.07)
IMM GRANULOCYTES NFR BLD AUTO: 0.2 % — SIGNIFICANT CHANGE UP (ref 0–0.9)
LYMPHOCYTES # BLD AUTO: 1.2 K/UL — SIGNIFICANT CHANGE UP (ref 1–3.3)
LYMPHOCYTES NFR BLD AUTO: 25.8 % — SIGNIFICANT CHANGE UP (ref 13–44)
MCHC RBC-ENTMCNC: 29.4 PG — SIGNIFICANT CHANGE UP (ref 27–34)
MCHC RBC-ENTMCNC: 33.1 G/DL — SIGNIFICANT CHANGE UP (ref 32–36)
MCV RBC AUTO: 88.7 FL — SIGNIFICANT CHANGE UP (ref 80–100)
MONOCYTES # BLD AUTO: 0.52 K/UL — SIGNIFICANT CHANGE UP (ref 0–0.9)
MONOCYTES NFR BLD AUTO: 11.2 % — SIGNIFICANT CHANGE UP (ref 2–14)
NEUTROPHILS # BLD AUTO: 2.47 K/UL — SIGNIFICANT CHANGE UP (ref 1.8–7.4)
NEUTROPHILS NFR BLD AUTO: 53.1 % — SIGNIFICANT CHANGE UP (ref 43–77)
NRBC # BLD AUTO: 0 K/UL — SIGNIFICANT CHANGE UP (ref 0–0)
NRBC # FLD: 0 K/UL — SIGNIFICANT CHANGE UP (ref 0–0)
NRBC BLD AUTO-RTO: 0 /100 WBCS — SIGNIFICANT CHANGE UP (ref 0–0)
PLATELET # BLD AUTO: 111 K/UL — LOW (ref 150–400)
PMV BLD: 10.7 FL — SIGNIFICANT CHANGE UP (ref 7–13)
POTASSIUM SERPL-MCNC: 4.1 MMOL/L — SIGNIFICANT CHANGE UP (ref 3.5–5)
POTASSIUM SERPL-SCNC: 4.1 MMOL/L — SIGNIFICANT CHANGE UP (ref 3.5–5)
RBC # BLD: 3.27 M/UL — LOW (ref 3.8–5.2)
RBC # FLD: 13.6 % — SIGNIFICANT CHANGE UP (ref 10.3–14.5)
SODIUM SERPL-SCNC: 142 MMOL/L — SIGNIFICANT CHANGE UP (ref 135–146)
WBC # BLD: 4.65 K/UL — SIGNIFICANT CHANGE UP (ref 3.8–10.5)
WBC # FLD AUTO: 4.65 K/UL — SIGNIFICANT CHANGE UP (ref 3.8–10.5)

## 2025-08-31 PROCEDURE — 99239 HOSP IP/OBS DSCHRG MGMT >30: CPT

## 2025-08-31 PROCEDURE — 99233 SBSQ HOSP IP/OBS HIGH 50: CPT

## 2025-08-31 RX ADMIN — BRIMONIDINE TARTRATE 1 DROP(S): 1.5 SOLUTION/ DROPS OPHTHALMIC at 05:18

## 2025-08-31 RX ADMIN — METOPROLOL SUCCINATE 25 MILLIGRAM(S): 50 TABLET, EXTENDED RELEASE ORAL at 07:17

## 2025-08-31 RX ADMIN — Medication 40 MILLIGRAM(S): at 05:18

## 2025-08-31 RX ADMIN — Medication 40 MILLIGRAM(S): at 17:34

## 2025-08-31 RX ADMIN — BRIMONIDINE TARTRATE 1 DROP(S): 1.5 SOLUTION/ DROPS OPHTHALMIC at 14:19

## 2025-08-31 RX ADMIN — Medication 1 APPLICATION(S): at 11:31

## 2025-08-31 RX ADMIN — DORZOLAMIDE 1 DROP(S): 20 SOLUTION/ DROPS OPHTHALMIC at 05:18

## 2025-08-31 RX ADMIN — DORZOLAMIDE 1 DROP(S): 20 SOLUTION/ DROPS OPHTHALMIC at 14:19

## 2025-09-01 ENCOUNTER — APPOINTMENT (OUTPATIENT)
Dept: GERIATRICS | Facility: HOME HEALTH | Age: 89
End: 2025-09-01
Payer: MEDICARE

## 2025-09-01 VITALS
SYSTOLIC BLOOD PRESSURE: 104 MMHG | DIASTOLIC BLOOD PRESSURE: 52 MMHG | HEART RATE: 76 BPM | OXYGEN SATURATION: 97 % | RESPIRATION RATE: 18 BRPM | TEMPERATURE: 97.2 F

## 2025-09-01 DIAGNOSIS — I10 ESSENTIAL (PRIMARY) HYPERTENSION: ICD-10-CM

## 2025-09-01 DIAGNOSIS — I77.810 THORACIC AORTIC ECTASIA: ICD-10-CM

## 2025-09-01 DIAGNOSIS — F03.90 UNSPECIFIED DEMENTIA W/OUT BEHAVIORAL DISTURBANCE: ICD-10-CM

## 2025-09-01 DIAGNOSIS — E78.00 PURE HYPERCHOLESTEROLEMIA, UNSPECIFIED: ICD-10-CM

## 2025-09-01 DIAGNOSIS — K92.2 GASTROINTESTINAL HEMORRHAGE, UNSPECIFIED: ICD-10-CM

## 2025-09-01 PROCEDURE — 99496 TRANSJ CARE MGMT HIGH F2F 7D: CPT

## 2025-09-01 RX ORDER — PANTOPRAZOLE 40 MG/1
40 TABLET, DELAYED RELEASE ORAL
Qty: 180 | Refills: 2 | Status: ACTIVE | COMMUNITY

## 2025-09-06 DIAGNOSIS — D62 ACUTE POSTHEMORRHAGIC ANEMIA: ICD-10-CM

## 2025-09-06 DIAGNOSIS — I71.43 INFRARENAL ABDOMINAL AORTIC ANEURYSM, WITHOUT RUPTURE: ICD-10-CM

## 2025-09-06 DIAGNOSIS — I10 ESSENTIAL (PRIMARY) HYPERTENSION: ICD-10-CM

## 2025-09-06 DIAGNOSIS — K27.9 PEPTIC ULCER, SITE UNSPECIFIED, UNSPECIFIED AS ACUTE OR CHRONIC, WITHOUT HEMORRHAGE OR PERFORATION: ICD-10-CM

## 2025-09-06 DIAGNOSIS — N17.9 ACUTE KIDNEY FAILURE, UNSPECIFIED: ICD-10-CM

## 2025-09-06 DIAGNOSIS — I72.3 ANEURYSM OF ILIAC ARTERY: ICD-10-CM

## 2025-09-06 DIAGNOSIS — I25.10 ATHEROSCLEROTIC HEART DISEASE OF NATIVE CORONARY ARTERY WITHOUT ANGINA PECTORIS: ICD-10-CM

## 2025-09-06 DIAGNOSIS — E78.5 HYPERLIPIDEMIA, UNSPECIFIED: ICD-10-CM

## 2025-09-06 DIAGNOSIS — Z66 DO NOT RESUSCITATE: ICD-10-CM

## 2025-09-06 DIAGNOSIS — I74.5 EMBOLISM AND THROMBOSIS OF ILIAC ARTERY: ICD-10-CM

## 2025-09-06 DIAGNOSIS — F03.90 UNSPECIFIED DEMENTIA, UNSPECIFIED SEVERITY, WITHOUT BEHAVIORAL DISTURBANCE, PSYCHOTIC DISTURBANCE, MOOD DISTURBANCE, AND ANXIETY: ICD-10-CM

## 2025-09-06 DIAGNOSIS — K92.1 MELENA: ICD-10-CM

## 2025-09-11 ENCOUNTER — NON-APPOINTMENT (OUTPATIENT)
Age: 89
End: 2025-09-11

## 2025-09-11 DIAGNOSIS — D64.9 ANEMIA, UNSPECIFIED: ICD-10-CM

## 2025-09-11 LAB
ANION GAP SERPL CALC-SCNC: 13 MMOL/L
BASOPHILS # BLD AUTO: 0.06 K/UL
BASOPHILS NFR BLD AUTO: 1.2 %
BUN SERPL-MCNC: 19 MG/DL
CALCIUM SERPL-MCNC: 9.4 MG/DL
CHLORIDE SERPL-SCNC: 106 MMOL/L
CO2 SERPL-SCNC: 21 MMOL/L
CREAT SERPL-MCNC: 1 MG/DL
EGFRCR SERPLBLD CKD-EPI 2021: 52 ML/MIN/1.73M2
EOSINOPHIL # BLD AUTO: 0.33 K/UL
EOSINOPHIL NFR BLD AUTO: 6.8 %
GLUCOSE SERPL-MCNC: 126 MG/DL
HCT VFR BLD CALC: 32.8 %
HGB BLD-MCNC: 10.8 G/DL
IMM GRANULOCYTES NFR BLD AUTO: 0.4 %
LYMPHOCYTES # BLD AUTO: 1.2 K/UL
LYMPHOCYTES NFR BLD AUTO: 24.6 %
MAN DIFF?: NORMAL
MCHC RBC-ENTMCNC: 30 PG
MCHC RBC-ENTMCNC: 32.9 G/DL
MCV RBC AUTO: 91.1 FL
MONOCYTES # BLD AUTO: 0.44 K/UL
MONOCYTES NFR BLD AUTO: 9 %
NEUTROPHILS # BLD AUTO: 2.82 K/UL
NEUTROPHILS NFR BLD AUTO: 58 %
PLATELET # BLD AUTO: 141 K/UL
PMV BLD AUTO: 0 /100 WBCS
PMV BLD: 10.4 FL
POTASSIUM SERPL-SCNC: 4.1 MMOL/L
RBC # BLD: 3.6 M/UL
RBC # FLD: 14.4 %
SODIUM SERPL-SCNC: 140 MMOL/L
WBC # FLD AUTO: 4.87 K/UL